# Patient Record
Sex: FEMALE | Race: WHITE | NOT HISPANIC OR LATINO | Employment: OTHER | URBAN - NONMETROPOLITAN AREA
[De-identification: names, ages, dates, MRNs, and addresses within clinical notes are randomized per-mention and may not be internally consistent; named-entity substitution may affect disease eponyms.]

---

## 2017-05-09 ENCOUNTER — HOSPITAL ENCOUNTER (OUTPATIENT)
Dept: GENERAL RADIOLOGY | Facility: HOSPITAL | Age: 67
Discharge: HOME | End: 2017-05-09
Attending: INTERNAL MEDICINE | Admitting: INTERNAL MEDICINE

## 2017-05-09 ENCOUNTER — TRANSCRIBE ORDERS (OUTPATIENT)
Dept: ADMINISTRATIVE | Facility: HOSPITAL | Age: 67
End: 2017-05-09

## 2017-05-09 DIAGNOSIS — M54.2 CERVICALGIA: Primary | ICD-10-CM

## 2017-05-09 DIAGNOSIS — M54.2 CERVICALGIA: ICD-10-CM

## 2017-05-09 PROCEDURE — 72052 X-RAY EXAM NECK SPINE 6/>VWS: CPT | Performed by: RADIOLOGY

## 2017-05-09 PROCEDURE — 72050 X-RAY EXAM NECK SPINE 4/5VWS: CPT

## 2017-06-14 ENCOUNTER — TRANSCRIBE ORDERS (OUTPATIENT)
Dept: ADMINISTRATIVE | Facility: HOSPITAL | Age: 67
End: 2017-06-14

## 2017-06-14 DIAGNOSIS — M81.0 OSTEOPOROSIS: Primary | ICD-10-CM

## 2017-06-30 ENCOUNTER — HOSPITAL ENCOUNTER (OUTPATIENT)
Dept: BONE DENSITY | Facility: HOSPITAL | Age: 67
Discharge: HOME | End: 2017-06-30
Attending: INTERNAL MEDICINE | Admitting: INTERNAL MEDICINE

## 2017-06-30 DIAGNOSIS — M81.0 OSTEOPOROSIS: ICD-10-CM

## 2017-06-30 PROCEDURE — 77080 DXA BONE DENSITY AXIAL: CPT

## 2017-06-30 PROCEDURE — 77080 DXA BONE DENSITY AXIAL: CPT | Performed by: RADIOLOGY

## 2017-12-08 ENCOUNTER — TRANSCRIBE ORDERS (OUTPATIENT)
Dept: ADMINISTRATIVE | Facility: HOSPITAL | Age: 67
End: 2017-12-08

## 2017-12-08 DIAGNOSIS — R68.84 JAW PAIN: Primary | ICD-10-CM

## 2017-12-13 ENCOUNTER — HOSPITAL ENCOUNTER (OUTPATIENT)
Dept: CARDIOLOGY | Facility: HOSPITAL | Age: 67
Discharge: HOME | End: 2017-12-13
Attending: INTERNAL MEDICINE

## 2017-12-13 DIAGNOSIS — R68.84 JAW PAIN: ICD-10-CM

## 2017-12-13 LAB
BH CV ECHO MEAS - % IVS THICK: 47.1 %
BH CV ECHO MEAS - % LVPW THICK: 121.1 %
BH CV ECHO MEAS - ACS: 1.6 CM
BH CV ECHO MEAS - AO MAX PG: 8.1 MMHG
BH CV ECHO MEAS - AO MEAN PG: 4.4 MMHG
BH CV ECHO MEAS - AO ROOT AREA (BSA CORRECTED): 1.6
BH CV ECHO MEAS - AO ROOT AREA: 6 CM^2
BH CV ECHO MEAS - AO ROOT DIAM: 2.8 CM
BH CV ECHO MEAS - AO V2 MAX: 142.4 CM/SEC
BH CV ECHO MEAS - AO V2 MEAN: 100.1 CM/SEC
BH CV ECHO MEAS - AO V2 VTI: 31 CM
BH CV ECHO MEAS - BSA(HAYCOCK): 1.8 M^2
BH CV ECHO MEAS - BSA: 1.7 M^2
BH CV ECHO MEAS - BZI_BMI: 30.2 KILOGRAMS/M^2
BH CV ECHO MEAS - BZI_METRIC_HEIGHT: 154.9 CM
BH CV ECHO MEAS - BZI_METRIC_WEIGHT: 72.6 KG
BH CV ECHO MEAS - CONTRAST EF 4CH: 63.4 ML/M^2
BH CV ECHO MEAS - EDV(CUBED): 85.4 ML
BH CV ECHO MEAS - EDV(MOD-SP4): 41 ML
BH CV ECHO MEAS - EDV(TEICH): 87.9 ML
BH CV ECHO MEAS - EF(CUBED): 76.4 %
BH CV ECHO MEAS - EF(TEICH): 68.7 %
BH CV ECHO MEAS - ESV(CUBED): 20.2 ML
BH CV ECHO MEAS - ESV(MOD-SP4): 15 ML
BH CV ECHO MEAS - ESV(TEICH): 27.5 ML
BH CV ECHO MEAS - FS: 38.2 %
BH CV ECHO MEAS - IVS/LVPW: 0.89
BH CV ECHO MEAS - IVSD: 0.61 CM
BH CV ECHO MEAS - IVSS: 0.9 CM
BH CV ECHO MEAS - LA DIMENSION: 3.3 CM
BH CV ECHO MEAS - LA/AO: 1.2
BH CV ECHO MEAS - LV DIASTOLIC VOL/BSA (35-75): 23.9 ML/M^2
BH CV ECHO MEAS - LV MASS(C)D: 83.1 GRAMS
BH CV ECHO MEAS - LV MASS(C)DI: 48.3 GRAMS/M^2
BH CV ECHO MEAS - LV MASS(C)S: 95.5 GRAMS
BH CV ECHO MEAS - LV MASS(C)SI: 55.6 GRAMS/M^2
BH CV ECHO MEAS - LV SYSTOLIC VOL/BSA (12-30): 8.7 ML/M^2
BH CV ECHO MEAS - LVIDD: 4.4 CM
BH CV ECHO MEAS - LVIDS: 2.7 CM
BH CV ECHO MEAS - LVLD AP4: 6.5 CM
BH CV ECHO MEAS - LVLS AP4: 5.7 CM
BH CV ECHO MEAS - LVOT AREA (M): 2.5 CM^2
BH CV ECHO MEAS - LVOT AREA: 2.5 CM^2
BH CV ECHO MEAS - LVOT DIAM: 1.8 CM
BH CV ECHO MEAS - LVPWD: 0.68 CM
BH CV ECHO MEAS - LVPWS: 1.5 CM
BH CV ECHO MEAS - MV A MAX VEL: 92.8 CM/SEC
BH CV ECHO MEAS - MV E MAX VEL: 65.2 CM/SEC
BH CV ECHO MEAS - MV E/A: 0.7
BH CV ECHO MEAS - PA ACC SLOPE: 1112 CM/SEC^2
BH CV ECHO MEAS - PA ACC TIME: 0.09 SEC
BH CV ECHO MEAS - PA PR(ACCEL): 37.8 MMHG
BH CV ECHO MEAS - RAP SYSTOLE: 10 MMHG
BH CV ECHO MEAS - RVDD: 2.2 CM
BH CV ECHO MEAS - RVSP: 36.7 MMHG
BH CV ECHO MEAS - SI(AO): 107.6 ML/M^2
BH CV ECHO MEAS - SI(CUBED): 38 ML/M^2
BH CV ECHO MEAS - SI(MOD-SP4): 15.1 ML/M^2
BH CV ECHO MEAS - SI(TEICH): 35.1 ML/M^2
BH CV ECHO MEAS - SV(AO): 184.8 ML
BH CV ECHO MEAS - SV(CUBED): 65.3 ML
BH CV ECHO MEAS - SV(MOD-SP4): 26 ML
BH CV ECHO MEAS - SV(TEICH): 60.3 ML
BH CV ECHO MEAS - TR MAX VEL: 258.2 CM/SEC
MAXIMAL PREDICTED HEART RATE: 152 BPM
STRESS TARGET HR: 129 BPM

## 2017-12-13 PROCEDURE — 93306 TTE W/DOPPLER COMPLETE: CPT

## 2018-02-28 ENCOUNTER — TRANSCRIBE ORDERS (OUTPATIENT)
Dept: ADMINISTRATIVE | Facility: HOSPITAL | Age: 68
End: 2018-02-28

## 2018-02-28 DIAGNOSIS — Z12.31 VISIT FOR SCREENING MAMMOGRAM: Primary | ICD-10-CM

## 2018-03-23 ENCOUNTER — APPOINTMENT (OUTPATIENT)
Dept: MAMMOGRAPHY | Facility: HOSPITAL | Age: 68
End: 2018-03-23
Attending: INTERNAL MEDICINE

## 2018-03-25 ENCOUNTER — APPOINTMENT (OUTPATIENT)
Dept: GENERAL RADIOLOGY | Facility: HOSPITAL | Age: 68
End: 2018-03-25

## 2018-03-25 ENCOUNTER — HOSPITAL ENCOUNTER (EMERGENCY)
Facility: HOSPITAL | Age: 68
Discharge: HOME | End: 2018-03-25
Attending: EMERGENCY MEDICINE | Admitting: EMERGENCY MEDICINE

## 2018-03-25 VITALS
DIASTOLIC BLOOD PRESSURE: 72 MMHG | SYSTOLIC BLOOD PRESSURE: 126 MMHG | RESPIRATION RATE: 16 BRPM | WEIGHT: 161 LBS | OXYGEN SATURATION: 95 % | BODY MASS INDEX: 30.4 KG/M2 | HEIGHT: 61 IN | TEMPERATURE: 97.9 F | HEART RATE: 64 BPM

## 2018-03-25 DIAGNOSIS — J40 BRONCHITIS: Primary | ICD-10-CM

## 2018-03-25 LAB
ALBUMIN SERPL-MCNC: 4.2 G/DL (ref 3.4–4.8)
ALBUMIN/GLOB SERPL: 1.5 G/DL (ref 1.5–2.5)
ALP SERPL-CCNC: 73 U/L (ref 35–104)
ALT SERPL W P-5'-P-CCNC: 25 U/L (ref 10–36)
ANION GAP SERPL CALCULATED.3IONS-SCNC: 8.1 MMOL/L (ref 3.6–11.2)
AST SERPL-CCNC: 24 U/L (ref 10–30)
BASOPHILS # BLD AUTO: 0.01 10*3/MM3 (ref 0–0.3)
BASOPHILS NFR BLD AUTO: 0.1 % (ref 0–2)
BILIRUB SERPL-MCNC: 0.5 MG/DL (ref 0.2–1.8)
BILIRUB UR QL STRIP: NEGATIVE
BUN BLD-MCNC: 20 MG/DL (ref 7–21)
BUN/CREAT SERPL: 25 (ref 7–25)
CALCIUM SPEC-SCNC: 9.3 MG/DL (ref 7.7–10)
CHLORIDE SERPL-SCNC: 104 MMOL/L (ref 99–112)
CLARITY UR: CLEAR
CO2 SERPL-SCNC: 26.9 MMOL/L (ref 24.3–31.9)
COLOR UR: YELLOW
CREAT BLD-MCNC: 0.8 MG/DL (ref 0.43–1.29)
DEPRECATED RDW RBC AUTO: 43.2 FL (ref 37–54)
EOSINOPHIL # BLD AUTO: 0.09 10*3/MM3 (ref 0–0.7)
EOSINOPHIL NFR BLD AUTO: 1 % (ref 0–7)
ERYTHROCYTE [DISTWIDTH] IN BLOOD BY AUTOMATED COUNT: 13.1 % (ref 11.5–14.5)
FLUAV AG NPH QL: NEGATIVE
FLUBV AG NPH QL IA: NEGATIVE
GFR SERPL CREATININE-BSD FRML MDRD: 71 ML/MIN/1.73
GLOBULIN UR ELPH-MCNC: 2.8 GM/DL
GLUCOSE BLD-MCNC: 116 MG/DL (ref 70–110)
GLUCOSE UR STRIP-MCNC: NEGATIVE MG/DL
HCT VFR BLD AUTO: 39 % (ref 37–47)
HGB BLD-MCNC: 12.9 G/DL (ref 12–16)
HGB UR QL STRIP.AUTO: NEGATIVE
IMM GRANULOCYTES # BLD: 0.02 10*3/MM3 (ref 0–0.03)
IMM GRANULOCYTES NFR BLD: 0.2 % (ref 0–0.5)
KETONES UR QL STRIP: NEGATIVE
LEUKOCYTE ESTERASE UR QL STRIP.AUTO: NEGATIVE
LYMPHOCYTES # BLD AUTO: 1.61 10*3/MM3 (ref 1–3)
LYMPHOCYTES NFR BLD AUTO: 17.9 % (ref 16–46)
MCH RBC QN AUTO: 29.9 PG (ref 27–33)
MCHC RBC AUTO-ENTMCNC: 33.1 G/DL (ref 33–37)
MCV RBC AUTO: 90.3 FL (ref 80–94)
MONOCYTES # BLD AUTO: 0.63 10*3/MM3 (ref 0.1–0.9)
MONOCYTES NFR BLD AUTO: 7 % (ref 0–12)
NEUTROPHILS # BLD AUTO: 6.61 10*3/MM3 (ref 1.4–6.5)
NEUTROPHILS NFR BLD AUTO: 73.8 % (ref 40–75)
NITRITE UR QL STRIP: NEGATIVE
OSMOLALITY SERPL CALC.SUM OF ELEC: 281.1 MOSM/KG (ref 273–305)
PH UR STRIP.AUTO: 8 [PH] (ref 5–8)
PLATELET # BLD AUTO: 241 10*3/MM3 (ref 130–400)
PMV BLD AUTO: 9.8 FL (ref 6–10)
POTASSIUM BLD-SCNC: 3.5 MMOL/L (ref 3.5–5.3)
PROT SERPL-MCNC: 7 G/DL (ref 6–8)
PROT UR QL STRIP: NEGATIVE
RBC # BLD AUTO: 4.32 10*6/MM3 (ref 4.2–5.4)
SODIUM BLD-SCNC: 139 MMOL/L (ref 135–153)
SP GR UR STRIP: 1.01 (ref 1–1.03)
TROPONIN I SERPL-MCNC: <0.006 NG/ML
UROBILINOGEN UR QL STRIP: NORMAL
WBC NRBC COR # BLD: 8.97 10*3/MM3 (ref 4.5–12.5)

## 2018-03-25 PROCEDURE — 93005 ELECTROCARDIOGRAM TRACING: CPT | Performed by: PHYSICIAN ASSISTANT

## 2018-03-25 PROCEDURE — 80053 COMPREHEN METABOLIC PANEL: CPT | Performed by: PHYSICIAN ASSISTANT

## 2018-03-25 PROCEDURE — 84484 ASSAY OF TROPONIN QUANT: CPT | Performed by: PHYSICIAN ASSISTANT

## 2018-03-25 PROCEDURE — 25010000002 METHYLPREDNISOLONE PER 125 MG: Performed by: PHYSICIAN ASSISTANT

## 2018-03-25 PROCEDURE — 96372 THER/PROPH/DIAG INJ SC/IM: CPT

## 2018-03-25 PROCEDURE — 71046 X-RAY EXAM CHEST 2 VIEWS: CPT

## 2018-03-25 PROCEDURE — 81003 URINALYSIS AUTO W/O SCOPE: CPT | Performed by: PHYSICIAN ASSISTANT

## 2018-03-25 PROCEDURE — 71046 X-RAY EXAM CHEST 2 VIEWS: CPT | Performed by: RADIOLOGY

## 2018-03-25 PROCEDURE — 87804 INFLUENZA ASSAY W/OPTIC: CPT | Performed by: PHYSICIAN ASSISTANT

## 2018-03-25 PROCEDURE — 85025 COMPLETE CBC W/AUTO DIFF WBC: CPT | Performed by: PHYSICIAN ASSISTANT

## 2018-03-25 PROCEDURE — 99283 EMERGENCY DEPT VISIT LOW MDM: CPT

## 2018-03-25 RX ORDER — OMEPRAZOLE 20 MG/1
40 CAPSULE, DELAYED RELEASE ORAL DAILY
COMMUNITY
End: 2020-01-13 | Stop reason: HOSPADM

## 2018-03-25 RX ORDER — MV-MIN/FOLIC/VIT K/LYCOP/COQ10 200-100MCG
CAPSULE ORAL
Status: ON HOLD | COMMUNITY
End: 2020-01-13

## 2018-03-25 RX ORDER — METOPROLOL SUCCINATE 25 MG/1
25 TABLET, EXTENDED RELEASE ORAL NIGHTLY
COMMUNITY

## 2018-03-25 RX ORDER — LISINOPRIL AND HYDROCHLOROTHIAZIDE 20; 12.5 MG/1; MG/1
1 TABLET ORAL DAILY
COMMUNITY
End: 2022-10-18 | Stop reason: ALTCHOICE

## 2018-03-25 RX ORDER — ROSUVASTATIN CALCIUM 20 MG/1
20 TABLET, COATED ORAL NIGHTLY
COMMUNITY

## 2018-03-25 RX ORDER — FUROSEMIDE 20 MG/1
20 TABLET ORAL AS NEEDED
Status: ON HOLD | COMMUNITY
End: 2020-01-13

## 2018-03-25 RX ORDER — CELECOXIB 200 MG/1
200 CAPSULE ORAL DAILY
Status: ON HOLD | COMMUNITY
End: 2020-01-13

## 2018-03-25 RX ORDER — METHYLPREDNISOLONE SODIUM SUCCINATE 125 MG/2ML
125 INJECTION, POWDER, LYOPHILIZED, FOR SOLUTION INTRAMUSCULAR; INTRAVENOUS ONCE
Status: COMPLETED | OUTPATIENT
Start: 2018-03-25 | End: 2018-03-25

## 2018-03-25 RX ORDER — MULTIPLE VITAMINS W/ MINERALS TAB 9MG-400MCG
1 TAB ORAL DAILY
COMMUNITY
End: 2021-04-22

## 2018-03-25 RX ORDER — AMOXICILLIN AND CLAVULANATE POTASSIUM 875; 125 MG/1; MG/1
1 TABLET, FILM COATED ORAL 2 TIMES DAILY
Qty: 14 TABLET | Refills: 0 | Status: SHIPPED | OUTPATIENT
Start: 2018-03-25 | End: 2018-04-01

## 2018-03-25 RX ADMIN — METHYLPREDNISOLONE SODIUM SUCCINATE 125 MG: 125 INJECTION, POWDER, FOR SOLUTION INTRAMUSCULAR; INTRAVENOUS at 13:07

## 2018-03-25 NOTE — ED PROVIDER NOTES
Subjective   Pt was seen on Wednesday for flu like symptoms at an urgent care. Tested negative for flu and strep, but was treated for a presumed flu with tamiflu. Pt states she has been taking that for 4 days and has still had no improvement. States she has a productive cough, and is worried she could have a pneumonia. She states she has had some chest tightness that is caused by her coughing, denies any pressure/tightness/squeezing. She states she had a fever earlier in the week as high as 101.5.        History provided by:  Patient   used: No    Cough   Cough characteristics:  Productive  Sputum characteristics:  Clear and yellow  Severity:  Moderate  Onset quality:  Sudden  Duration:  1 week  Timing:  Constant  Progression:  Unchanged  Chronicity:  New  Smoker: no    Context: upper respiratory infection    Relieved by:  Nothing  Ineffective treatments: tamiflu for suspected influenza.  Associated symptoms: myalgias    Associated symptoms: no chest pain, no fever, no headaches, no rash, no rhinorrhea, no shortness of breath, no sore throat and no wheezing    Risk factors: no recent infection        Review of Systems   Constitutional: Negative for activity change and fever.   HENT: Negative for congestion, rhinorrhea and sore throat.    Eyes: Negative for pain.   Respiratory: Positive for cough. Negative for shortness of breath and wheezing.    Cardiovascular: Negative for chest pain.   Gastrointestinal: Negative for abdominal distention, diarrhea, nausea and vomiting.   Genitourinary: Negative for difficulty urinating and dysuria.   Musculoskeletal: Positive for myalgias. Negative for arthralgias.   Skin: Negative for rash and wound.   Neurological: Negative for dizziness and headaches.   Psychiatric/Behavioral: Negative for agitation.   All other systems reviewed and are negative.      Past Medical History:   Diagnosis Date   • Arthritis    • Hyperlipidemia    • Hypertension        No Known  Allergies    Past Surgical History:   Procedure Laterality Date   • HYSTERECTOMY         History reviewed. No pertinent family history.    Social History     Social History   • Marital status:      Social History Main Topics   • Smoking status: Never Smoker   • Alcohol use No   • Drug use: No     Other Topics Concern   • Not on file           Objective   Physical Exam   Constitutional: She is oriented to person, place, and time. She appears well-developed and well-nourished.   HENT:   Head: Normocephalic and atraumatic.   Nose: Rhinorrhea present.   Mouth/Throat: No posterior oropharyngeal erythema.   Eyes: EOM are normal. Pupils are equal, round, and reactive to light.   Neck: Normal range of motion. Neck supple.   Cardiovascular: Normal rate, regular rhythm and normal heart sounds.    Pulmonary/Chest: Effort normal and breath sounds normal. She has no decreased breath sounds. She has no wheezes.   Abdominal: Soft. Bowel sounds are normal.   Musculoskeletal: Normal range of motion.   Neurological: She is alert and oriented to person, place, and time.   Skin: Skin is warm and dry.   Psychiatric: She has a normal mood and affect. Her behavior is normal. Judgment and thought content normal.   Nursing note and vitals reviewed.      Procedures         ED Course  ED Course                  MDM  Number of Diagnoses or Management Options     Amount and/or Complexity of Data Reviewed  Clinical lab tests: ordered and reviewed  Tests in the radiology section of CPT®: ordered and reviewed  Tests in the medicine section of CPT®: reviewed and ordered  Independent visualization of images, tracings, or specimens: yes    Patient Progress  Patient progress: stable      Final diagnoses:   Bronchitis            SARI Wild  03/25/18 2040

## 2018-10-04 ENCOUNTER — HOSPITAL ENCOUNTER (EMERGENCY)
Facility: HOSPITAL | Age: 68
Discharge: HOME | End: 2018-10-04
Attending: EMERGENCY MEDICINE | Admitting: EMERGENCY MEDICINE

## 2018-10-04 VITALS
HEART RATE: 65 BPM | RESPIRATION RATE: 18 BRPM | DIASTOLIC BLOOD PRESSURE: 72 MMHG | HEIGHT: 61 IN | TEMPERATURE: 98.2 F | BODY MASS INDEX: 32.47 KG/M2 | SYSTOLIC BLOOD PRESSURE: 165 MMHG | OXYGEN SATURATION: 98 % | WEIGHT: 172 LBS

## 2018-10-04 DIAGNOSIS — N30.01 ACUTE CYSTITIS WITH HEMATURIA: Primary | ICD-10-CM

## 2018-10-04 LAB
BACTERIA UR QL AUTO: ABNORMAL /HPF
BILIRUB UR QL STRIP: NEGATIVE
CLARITY UR: ABNORMAL
COLOR UR: YELLOW
GLUCOSE UR STRIP-MCNC: NEGATIVE MG/DL
HGB UR QL STRIP.AUTO: ABNORMAL
HYALINE CASTS UR QL AUTO: ABNORMAL /LPF
KETONES UR QL STRIP: NEGATIVE
LEUKOCYTE ESTERASE UR QL STRIP.AUTO: ABNORMAL
NITRITE UR QL STRIP: NEGATIVE
PH UR STRIP.AUTO: 7 [PH] (ref 5–8)
PROT UR QL STRIP: ABNORMAL
RBC # UR: ABNORMAL /HPF
REF LAB TEST METHOD: ABNORMAL
SP GR UR STRIP: 1.01 (ref 1–1.03)
SQUAMOUS #/AREA URNS HPF: ABNORMAL /HPF
UROBILINOGEN UR QL STRIP: ABNORMAL
WBC UR QL AUTO: ABNORMAL /HPF

## 2018-10-04 PROCEDURE — 81001 URINALYSIS AUTO W/SCOPE: CPT | Performed by: EMERGENCY MEDICINE

## 2018-10-04 PROCEDURE — 99284 EMERGENCY DEPT VISIT MOD MDM: CPT

## 2018-10-04 RX ORDER — NITROFURANTOIN 25; 75 MG/1; MG/1
100 CAPSULE ORAL ONCE
Status: COMPLETED | OUTPATIENT
Start: 2018-10-04 | End: 2018-10-04

## 2018-10-04 RX ORDER — PHENAZOPYRIDINE HYDROCHLORIDE 200 MG/1
200 TABLET, FILM COATED ORAL ONCE
Status: COMPLETED | OUTPATIENT
Start: 2018-10-04 | End: 2018-10-04

## 2018-10-04 RX ORDER — NITROFURANTOIN 25; 75 MG/1; MG/1
100 CAPSULE ORAL 2 TIMES DAILY
Qty: 14 CAPSULE | Refills: 0 | Status: SHIPPED | OUTPATIENT
Start: 2018-10-04 | End: 2018-10-11

## 2018-10-04 RX ORDER — PHENAZOPYRIDINE HYDROCHLORIDE 200 MG/1
200 TABLET, FILM COATED ORAL 3 TIMES DAILY PRN
Qty: 6 TABLET | Refills: 0 | Status: ON HOLD | OUTPATIENT
Start: 2018-10-04 | End: 2020-01-13

## 2018-10-04 RX ADMIN — PHENAZOPYRIDINE 200 MG: 200 TABLET ORAL at 21:38

## 2018-10-04 RX ADMIN — NITROFURANTOIN MONOHYDRATE/MACROCRYSTALLINE 100 MG: 25; 75 CAPSULE ORAL at 21:39

## 2018-10-05 NOTE — ED NOTES
Been off marcobid x5 days but continues to hurt. States not emptying     Sharita Storey RN  10/04/18 3323

## 2018-10-05 NOTE — ED PROVIDER NOTES
Subjective   This is a 68-year-old female comes in with chief complaint dysuria for the past 2 days.  Patient states she's had increased urinary frequency, burning with urination and low back pain.  Patient denies any recent nausea, vomiting,, fever, chills.        History provided by:  Patient   used: No    Female  Problem   Primary symptoms include discharge, dysuria.  Primary symptoms include no pelvic pain. There has been no fever. This is a new problem. The problem has not changed since onset.The symptoms occur during urination. She is not pregnant. The discharge was normal. Associated symptoms include abdominal pain and frequency. Pertinent negatives include no anorexia, no diaphoresis and no nausea. She has tried nothing for the symptoms. Associated medical issues do not include STD, PID, perineal abscess, vaginosis, hypermenorrhea, ovarian cysts, endometriosis or gynecological surgery.       Review of Systems   Constitutional: Negative for diaphoresis.   Gastrointestinal: Positive for abdominal distention and abdominal pain. Negative for anorexia and nausea.   Genitourinary: Positive for dysuria, frequency and urgency. Negative for enuresis, flank pain and pelvic pain.   All other systems reviewed and are negative.      Past Medical History:   Diagnosis Date   • Arthritis    • Hyperlipidemia    • Hypertension        No Known Allergies    Past Surgical History:   Procedure Laterality Date   • HYSTERECTOMY         History reviewed. No pertinent family history.    Social History     Social History   • Marital status:      Social History Main Topics   • Smoking status: Never Smoker   • Alcohol use No   • Drug use: No     Other Topics Concern   • Not on file           Objective   Physical Exam   Constitutional: She is oriented to person, place, and time. She appears well-developed and well-nourished. No distress.   HENT:   Head: Normocephalic.   Right Ear: External ear normal.   Left  Ear: External ear normal.   Nose: Nose normal.   Mouth/Throat: Oropharynx is clear and moist. No oropharyngeal exudate.   Eyes: Pupils are equal, round, and reactive to light. Conjunctivae and EOM are normal. Right eye exhibits no discharge. Left eye exhibits no discharge. No scleral icterus.   Neck: Normal range of motion. Neck supple. No JVD present. No tracheal deviation present. No thyromegaly present.   Cardiovascular: Normal rate, regular rhythm, normal heart sounds and intact distal pulses.  Exam reveals no friction rub.    No murmur heard.  Pulmonary/Chest: Effort normal and breath sounds normal. No stridor. No respiratory distress. She has no wheezes. She has no rales. She exhibits no tenderness.   Abdominal: Soft. Bowel sounds are normal. She exhibits no distension and no mass. There is no tenderness. There is no rebound and no guarding. No hernia.   Musculoskeletal: Normal range of motion. She exhibits no edema, tenderness or deformity.   Lymphadenopathy:     She has no cervical adenopathy.   Neurological: She is alert and oriented to person, place, and time. She displays normal reflexes. No cranial nerve deficit or sensory deficit. She exhibits normal muscle tone. Coordination normal.   Skin: Skin is warm. Capillary refill takes less than 2 seconds. No rash noted. No erythema. No pallor.   Psychiatric: She has a normal mood and affect. Her behavior is normal. Judgment and thought content normal.   Nursing note and vitals reviewed.      Procedures           ED Course  ED Course as of Oct 04 2139   Thu Oct 04, 2018   2136 Discussed care with patient. Advised to follow-up with pcp.   [BH]      ED Course User Index  [] Tk Cedeno PA-C                  [facility]      Final diagnoses:   Acute cystitis with hematuria            Tk Cedeno PA-C  10/04/18 2139

## 2019-05-13 ENCOUNTER — TRANSCRIBE ORDERS (OUTPATIENT)
Dept: ADMINISTRATIVE | Facility: HOSPITAL | Age: 69
End: 2019-05-13

## 2019-05-13 ENCOUNTER — HOSPITAL ENCOUNTER (OUTPATIENT)
Dept: GENERAL RADIOLOGY | Facility: HOSPITAL | Age: 69
Discharge: HOME | End: 2019-05-13
Admitting: INTERNAL MEDICINE

## 2019-05-13 DIAGNOSIS — M54.50 LUMBAR PAIN: Primary | ICD-10-CM

## 2019-05-13 DIAGNOSIS — M54.50 LUMBAR PAIN: ICD-10-CM

## 2019-05-13 PROCEDURE — 72110 X-RAY EXAM L-2 SPINE 4/>VWS: CPT | Performed by: RADIOLOGY

## 2019-05-13 PROCEDURE — 72110 X-RAY EXAM L-2 SPINE 4/>VWS: CPT

## 2019-07-29 ENCOUNTER — HOSPITAL ENCOUNTER (OUTPATIENT)
Dept: GENERAL RADIOLOGY | Facility: HOSPITAL | Age: 69
Discharge: HOME | End: 2019-07-29
Admitting: INTERNAL MEDICINE

## 2019-07-29 ENCOUNTER — TRANSCRIBE ORDERS (OUTPATIENT)
Dept: ADMINISTRATIVE | Facility: HOSPITAL | Age: 69
End: 2019-07-29

## 2019-07-29 DIAGNOSIS — R79.81 ABNORMAL ARTERIAL BLOOD GASES: ICD-10-CM

## 2019-07-29 DIAGNOSIS — R79.81 ABNORMAL ARTERIAL BLOOD GASES: Primary | ICD-10-CM

## 2019-07-29 PROCEDURE — 71046 X-RAY EXAM CHEST 2 VIEWS: CPT

## 2019-07-29 PROCEDURE — 71046 X-RAY EXAM CHEST 2 VIEWS: CPT | Performed by: RADIOLOGY

## 2020-01-02 ENCOUNTER — TRANSCRIBE ORDERS (OUTPATIENT)
Dept: OTHER | Facility: OTHER | Age: 70
End: 2020-01-02

## 2020-01-02 ENCOUNTER — HOSPITAL ENCOUNTER (OUTPATIENT)
Dept: GENERAL RADIOLOGY | Facility: HOSPITAL | Age: 70
Discharge: HOME | End: 2020-01-02
Admitting: INTERNAL MEDICINE

## 2020-01-02 DIAGNOSIS — M16.10 DEGENERATIVE JOINT DISEASE OF PELVIC REGION: ICD-10-CM

## 2020-01-02 DIAGNOSIS — M16.10 DEGENERATIVE JOINT DISEASE OF PELVIC REGION: Primary | ICD-10-CM

## 2020-01-02 PROCEDURE — 73523 X-RAY EXAM HIPS BI 5/> VIEWS: CPT | Performed by: RADIOLOGY

## 2020-01-02 PROCEDURE — 73523 X-RAY EXAM HIPS BI 5/> VIEWS: CPT

## 2020-01-13 ENCOUNTER — APPOINTMENT (OUTPATIENT)
Dept: CARDIOLOGY | Facility: HOSPITAL | Age: 70
End: 2020-01-13

## 2020-01-13 ENCOUNTER — APPOINTMENT (OUTPATIENT)
Dept: NUCLEAR MEDICINE | Facility: HOSPITAL | Age: 70
End: 2020-01-13

## 2020-01-13 ENCOUNTER — HOSPITAL ENCOUNTER (OUTPATIENT)
Facility: HOSPITAL | Age: 70
Setting detail: OBSERVATION
Discharge: HOME | End: 2020-01-13
Attending: FAMILY MEDICINE | Admitting: PHYSICIAN ASSISTANT

## 2020-01-13 ENCOUNTER — APPOINTMENT (OUTPATIENT)
Dept: GENERAL RADIOLOGY | Facility: HOSPITAL | Age: 70
End: 2020-01-13

## 2020-01-13 VITALS
RESPIRATION RATE: 20 BRPM | HEART RATE: 75 BPM | WEIGHT: 166.6 LBS | TEMPERATURE: 97.7 F | HEIGHT: 61 IN | BODY MASS INDEX: 31.45 KG/M2 | DIASTOLIC BLOOD PRESSURE: 82 MMHG | SYSTOLIC BLOOD PRESSURE: 157 MMHG | OXYGEN SATURATION: 96 %

## 2020-01-13 DIAGNOSIS — R07.9 CHEST PAIN WITH LOW RISK FOR CARDIAC ETIOLOGY: Primary | ICD-10-CM

## 2020-01-13 LAB
A-A DO2: 28.6 MMHG (ref 0–300)
ALBUMIN SERPL-MCNC: 4.29 G/DL (ref 3.5–5.2)
ALBUMIN/GLOB SERPL: 1.6 G/DL
ALP SERPL-CCNC: 68 U/L (ref 39–117)
ALT SERPL W P-5'-P-CCNC: 20 U/L (ref 1–33)
ANION GAP SERPL CALCULATED.3IONS-SCNC: 15.2 MMOL/L (ref 5–15)
APTT PPP: 26.7 SECONDS (ref 23.8–36.1)
ARTERIAL PATENCY WRIST A: POSITIVE
AST SERPL-CCNC: 23 U/L (ref 1–32)
ATMOSPHERIC PRESS: 733 MMHG
BASE EXCESS BLDA CALC-SCNC: 1.2 MMOL/L (ref 0–2)
BASOPHILS # BLD AUTO: 0.04 10*3/MM3 (ref 0–0.2)
BASOPHILS NFR BLD AUTO: 0.5 % (ref 0–1.5)
BDY SITE: ABNORMAL
BH CV ECHO MEAS - % IVS THICK: 17 %
BH CV ECHO MEAS - % LVPW THICK: 61.9 %
BH CV ECHO MEAS - ACS: 1.6 CM
BH CV ECHO MEAS - AO MAX PG: 8.4 MMHG
BH CV ECHO MEAS - AO MEAN PG: 3.6 MMHG
BH CV ECHO MEAS - AO ROOT AREA (BSA CORRECTED): 1.5
BH CV ECHO MEAS - AO ROOT AREA: 5.3 CM^2
BH CV ECHO MEAS - AO ROOT DIAM: 2.6 CM
BH CV ECHO MEAS - AO V2 MAX: 145.2 CM/SEC
BH CV ECHO MEAS - AO V2 MEAN: 85 CM/SEC
BH CV ECHO MEAS - AO V2 VTI: 31.4 CM
BH CV ECHO MEAS - BSA(HAYCOCK): 1.8 M^2
BH CV ECHO MEAS - BSA: 1.7 M^2
BH CV ECHO MEAS - BZI_BMI: 31 KILOGRAMS/M^2
BH CV ECHO MEAS - BZI_METRIC_HEIGHT: 154.9 CM
BH CV ECHO MEAS - BZI_METRIC_WEIGHT: 74.4 KG
BH CV ECHO MEAS - EDV(CUBED): 66.1 ML
BH CV ECHO MEAS - EDV(MOD-SP4): 36 ML
BH CV ECHO MEAS - EDV(TEICH): 71.8 ML
BH CV ECHO MEAS - EF(CUBED): 77 %
BH CV ECHO MEAS - EF(MOD-SP4): 63.9 %
BH CV ECHO MEAS - EF(TEICH): 69.6 %
BH CV ECHO MEAS - ESV(CUBED): 15.2 ML
BH CV ECHO MEAS - ESV(MOD-SP4): 13 ML
BH CV ECHO MEAS - ESV(TEICH): 21.8 ML
BH CV ECHO MEAS - FS: 38.7 %
BH CV ECHO MEAS - IVS/LVPW: 1.1
BH CV ECHO MEAS - IVSD: 1 CM
BH CV ECHO MEAS - IVSS: 1.2 CM
BH CV ECHO MEAS - LA DIMENSION: 2.6 CM
BH CV ECHO MEAS - LA/AO: 1
BH CV ECHO MEAS - LV DIASTOLIC VOL/BSA (35-75): 20.7 ML/M^2
BH CV ECHO MEAS - LV MASS(C)D: 123.8 GRAMS
BH CV ECHO MEAS - LV MASS(C)DI: 71.3 GRAMS/M^2
BH CV ECHO MEAS - LV MASS(C)S: 101.9 GRAMS
BH CV ECHO MEAS - LV MASS(C)SI: 58.7 GRAMS/M^2
BH CV ECHO MEAS - LV SYSTOLIC VOL/BSA (12-30): 7.5 ML/M^2
BH CV ECHO MEAS - LVIDD: 4 CM
BH CV ECHO MEAS - LVIDS: 2.5 CM
BH CV ECHO MEAS - LVLD AP4: 6.3 CM
BH CV ECHO MEAS - LVLS AP4: 5.6 CM
BH CV ECHO MEAS - LVOT AREA (M): 2.3 CM^2
BH CV ECHO MEAS - LVOT AREA: 2.1 CM^2
BH CV ECHO MEAS - LVOT DIAM: 1.7 CM
BH CV ECHO MEAS - LVPWD: 0.91 CM
BH CV ECHO MEAS - LVPWS: 1.5 CM
BH CV ECHO MEAS - MV A MAX VEL: 98.2 CM/SEC
BH CV ECHO MEAS - MV E MAX VEL: 61.2 CM/SEC
BH CV ECHO MEAS - MV E/A: 0.62
BH CV ECHO MEAS - PA ACC SLOPE: 938.1 CM/SEC^2
BH CV ECHO MEAS - PA ACC TIME: 0.11 SEC
BH CV ECHO MEAS - PA PR(ACCEL): 31.5 MMHG
BH CV ECHO MEAS - RAP SYSTOLE: 10 MMHG
BH CV ECHO MEAS - RVSP: 42.8 MMHG
BH CV ECHO MEAS - SI(AO): 95 ML/M^2
BH CV ECHO MEAS - SI(CUBED): 29.3 ML/M^2
BH CV ECHO MEAS - SI(MOD-SP4): 13.2 ML/M^2
BH CV ECHO MEAS - SI(TEICH): 28.8 ML/M^2
BH CV ECHO MEAS - SV(AO): 165 ML
BH CV ECHO MEAS - SV(CUBED): 50.9 ML
BH CV ECHO MEAS - SV(MOD-SP4): 23 ML
BH CV ECHO MEAS - SV(TEICH): 50 ML
BH CV ECHO MEAS - TR MAX VEL: 286.4 CM/SEC
BH CV NUCLEAR PRIOR STUDY: 3
BH CV STRESS BP STAGE 1: NORMAL
BH CV STRESS BP STAGE 2: NORMAL
BH CV STRESS COMMENTS STAGE 1: NORMAL
BH CV STRESS COMMENTS STAGE 2: NORMAL
BH CV STRESS DOSE REGADENOSON STAGE 1: 0.4
BH CV STRESS DURATION MIN STAGE 1: 0
BH CV STRESS DURATION MIN STAGE 2: 4
BH CV STRESS DURATION SEC STAGE 1: 10
BH CV STRESS DURATION SEC STAGE 2: 0
BH CV STRESS HR STAGE 1: 105
BH CV STRESS HR STAGE 2: 84
BH CV STRESS PROTOCOL 1: NORMAL
BH CV STRESS RECOVERY BP: NORMAL MMHG
BH CV STRESS RECOVERY HR: 84 BPM
BH CV STRESS STAGE 1: 1
BH CV STRESS STAGE 2: 2
BILIRUB SERPL-MCNC: 0.2 MG/DL (ref 0.2–1.2)
BILIRUB UR QL STRIP: NEGATIVE
BODY TEMPERATURE: 0 C
BUN BLD-MCNC: 15 MG/DL (ref 8–23)
BUN/CREAT SERPL: 16.7 (ref 7–25)
CALCIUM SPEC-SCNC: 10 MG/DL (ref 8.6–10.5)
CHLORIDE SERPL-SCNC: 103 MMOL/L (ref 98–107)
CLARITY UR: CLEAR
CO2 BLDA-SCNC: 26.7 MMOL/L (ref 22–33)
CO2 SERPL-SCNC: 24.8 MMOL/L (ref 22–29)
COHGB MFR BLD: 0.5 % (ref 0–5)
COLOR UR: YELLOW
CREAT BLD-MCNC: 0.9 MG/DL (ref 0.57–1)
CRP SERPL-MCNC: <0.03 MG/DL (ref 0–0.5)
DEPRECATED RDW RBC AUTO: 43.7 FL (ref 37–54)
EOSINOPHIL # BLD AUTO: 0.25 10*3/MM3 (ref 0–0.4)
EOSINOPHIL NFR BLD AUTO: 3.1 % (ref 0.3–6.2)
ERYTHROCYTE [DISTWIDTH] IN BLOOD BY AUTOMATED COUNT: 13.2 % (ref 12.3–15.4)
GFR SERPL CREATININE-BSD FRML MDRD: 62 ML/MIN/1.73
GLOBULIN UR ELPH-MCNC: 2.6 GM/DL
GLUCOSE BLD-MCNC: 105 MG/DL (ref 65–99)
GLUCOSE BLDC GLUCOMTR-MCNC: 114 MG/DL (ref 70–130)
GLUCOSE UR STRIP-MCNC: NEGATIVE MG/DL
HBA1C MFR BLD: 5.6 % (ref 4.8–5.6)
HCO3 BLDA-SCNC: 25.5 MMOL/L (ref 20–26)
HCT VFR BLD AUTO: 40.5 % (ref 34–46.6)
HCT VFR BLD CALC: 41.3 % (ref 38–51)
HGB BLD-MCNC: 13.5 G/DL (ref 12–15.9)
HGB BLDA-MCNC: 13.5 G/DL (ref 13.5–17.5)
HGB UR QL STRIP.AUTO: NEGATIVE
HOROWITZ INDEX BLD+IHG-RTO: 21 %
IMM GRANULOCYTES # BLD AUTO: 0.02 10*3/MM3 (ref 0–0.05)
IMM GRANULOCYTES NFR BLD AUTO: 0.2 % (ref 0–0.5)
INR PPP: 0.92 (ref 0.9–1.1)
KETONES UR QL STRIP: NEGATIVE
LEUKOCYTE ESTERASE UR QL STRIP.AUTO: NEGATIVE
LIPASE SERPL-CCNC: 66 U/L (ref 13–60)
LYMPHOCYTES # BLD AUTO: 2.11 10*3/MM3 (ref 0.7–3.1)
LYMPHOCYTES NFR BLD AUTO: 26.1 % (ref 19.6–45.3)
Lab: ABNORMAL
MAXIMAL PREDICTED HEART RATE: 150 BPM
MAXIMAL PREDICTED HEART RATE: 150 BPM
MCH RBC QN AUTO: 30.1 PG (ref 26.6–33)
MCHC RBC AUTO-ENTMCNC: 33.3 G/DL (ref 31.5–35.7)
MCV RBC AUTO: 90.2 FL (ref 79–97)
METHGB BLD QL: 0.3 % (ref 0–3)
MODALITY: ABNORMAL
MONOCYTES # BLD AUTO: 0.49 10*3/MM3 (ref 0.1–0.9)
MONOCYTES NFR BLD AUTO: 6.1 % (ref 5–12)
NEUTROPHILS # BLD AUTO: 5.18 10*3/MM3 (ref 1.7–7)
NEUTROPHILS NFR BLD AUTO: 64 % (ref 42.7–76)
NITRITE UR QL STRIP: NEGATIVE
NOTE: ABNORMAL
NRBC BLD AUTO-RTO: 0 /100 WBC (ref 0–0.2)
NT-PROBNP SERPL-MCNC: 57.8 PG/ML (ref 5–900)
OXYHGB MFR BLDV: 94.4 % (ref 94–99)
PCO2 BLDA: 38.7 MM HG (ref 35–45)
PCO2 TEMP ADJ BLD: ABNORMAL MM[HG]
PERCENT MAX PREDICTED HR: 70 %
PH BLDA: 7.43 PH UNITS (ref 7.35–7.45)
PH UR STRIP.AUTO: 6.5 [PH] (ref 5–8)
PH, TEMP CORRECTED: ABNORMAL
PLATELET # BLD AUTO: 253 10*3/MM3 (ref 140–450)
PMV BLD AUTO: 9.1 FL (ref 6–12)
PO2 BLDA: 71.7 MM HG (ref 83–108)
PO2 TEMP ADJ BLD: ABNORMAL MM[HG]
POTASSIUM BLD-SCNC: 3.7 MMOL/L (ref 3.5–5.2)
PROT SERPL-MCNC: 6.9 G/DL (ref 6–8.5)
PROT UR QL STRIP: NEGATIVE
PROTHROMBIN TIME: 12.9 SECONDS (ref 11–15.4)
RBC # BLD AUTO: 4.49 10*6/MM3 (ref 3.77–5.28)
SAO2 % BLDCOA: 95.2 % (ref 94–99)
SODIUM BLD-SCNC: 143 MMOL/L (ref 136–145)
SP GR UR STRIP: 1.01 (ref 1–1.03)
STRESS BASELINE BP: NORMAL MMHG
STRESS BASELINE HR: 80 BPM
STRESS PERCENT HR: 82 %
STRESS POST PEAK BP: NORMAL MMHG
STRESS POST PEAK HR: 105 BPM
STRESS TARGET HR: 128 BPM
STRESS TARGET HR: 128 BPM
TROPONIN T SERPL-MCNC: <0.01 NG/ML (ref 0–0.03)
TSH SERPL DL<=0.05 MIU/L-ACNC: 1.96 UIU/ML (ref 0.27–4.2)
UROBILINOGEN UR QL STRIP: NORMAL
VENTILATOR MODE: ABNORMAL
WBC NRBC COR # BLD: 8.09 10*3/MM3 (ref 3.4–10.8)

## 2020-01-13 PROCEDURE — 83690 ASSAY OF LIPASE: CPT | Performed by: FAMILY MEDICINE

## 2020-01-13 PROCEDURE — 84443 ASSAY THYROID STIM HORMONE: CPT | Performed by: PHYSICIAN ASSISTANT

## 2020-01-13 PROCEDURE — 86677 HELICOBACTER PYLORI ANTIBODY: CPT | Performed by: PHYSICIAN ASSISTANT

## 2020-01-13 PROCEDURE — 94799 UNLISTED PULMONARY SVC/PX: CPT

## 2020-01-13 PROCEDURE — 36415 COLL VENOUS BLD VENIPUNCTURE: CPT

## 2020-01-13 PROCEDURE — 99285 EMERGENCY DEPT VISIT HI MDM: CPT

## 2020-01-13 PROCEDURE — 99236 HOSP IP/OBS SAME DATE HI 85: CPT | Performed by: PHYSICIAN ASSISTANT

## 2020-01-13 PROCEDURE — 36600 WITHDRAWAL OF ARTERIAL BLOOD: CPT

## 2020-01-13 PROCEDURE — 93010 ELECTROCARDIOGRAM REPORT: CPT | Performed by: INTERNAL MEDICINE

## 2020-01-13 PROCEDURE — A9500 TC99M SESTAMIBI: HCPCS | Performed by: HOSPITALIST

## 2020-01-13 PROCEDURE — 93005 ELECTROCARDIOGRAM TRACING: CPT | Performed by: FAMILY MEDICINE

## 2020-01-13 PROCEDURE — 86140 C-REACTIVE PROTEIN: CPT | Performed by: FAMILY MEDICINE

## 2020-01-13 PROCEDURE — G0378 HOSPITAL OBSERVATION PER HR: HCPCS

## 2020-01-13 PROCEDURE — 85610 PROTHROMBIN TIME: CPT | Performed by: FAMILY MEDICINE

## 2020-01-13 PROCEDURE — 82805 BLOOD GASES W/O2 SATURATION: CPT

## 2020-01-13 PROCEDURE — 80053 COMPREHEN METABOLIC PANEL: CPT | Performed by: FAMILY MEDICINE

## 2020-01-13 PROCEDURE — 93018 CV STRESS TEST I&R ONLY: CPT | Performed by: INTERNAL MEDICINE

## 2020-01-13 PROCEDURE — 83880 ASSAY OF NATRIURETIC PEPTIDE: CPT | Performed by: FAMILY MEDICINE

## 2020-01-13 PROCEDURE — 0 TECHNETIUM SESTAMIBI: Performed by: HOSPITALIST

## 2020-01-13 PROCEDURE — 96372 THER/PROPH/DIAG INJ SC/IM: CPT

## 2020-01-13 PROCEDURE — 93017 CV STRESS TEST TRACING ONLY: CPT

## 2020-01-13 PROCEDURE — 25010000002 REGADENOSON 0.4 MG/5ML SOLUTION: Performed by: HOSPITALIST

## 2020-01-13 PROCEDURE — 83050 HGB METHEMOGLOBIN QUAN: CPT

## 2020-01-13 PROCEDURE — 93306 TTE W/DOPPLER COMPLETE: CPT | Performed by: INTERNAL MEDICINE

## 2020-01-13 PROCEDURE — 25010000002 HEPARIN (PORCINE) PER 1000 UNITS: Performed by: PHYSICIAN ASSISTANT

## 2020-01-13 PROCEDURE — 93306 TTE W/DOPPLER COMPLETE: CPT

## 2020-01-13 PROCEDURE — 85730 THROMBOPLASTIN TIME PARTIAL: CPT | Performed by: FAMILY MEDICINE

## 2020-01-13 PROCEDURE — 82962 GLUCOSE BLOOD TEST: CPT

## 2020-01-13 PROCEDURE — 85025 COMPLETE CBC W/AUTO DIFF WBC: CPT | Performed by: FAMILY MEDICINE

## 2020-01-13 PROCEDURE — 96374 THER/PROPH/DIAG INJ IV PUSH: CPT

## 2020-01-13 PROCEDURE — 78452 HT MUSCLE IMAGE SPECT MULT: CPT | Performed by: INTERNAL MEDICINE

## 2020-01-13 PROCEDURE — 84484 ASSAY OF TROPONIN QUANT: CPT | Performed by: FAMILY MEDICINE

## 2020-01-13 PROCEDURE — 71045 X-RAY EXAM CHEST 1 VIEW: CPT

## 2020-01-13 PROCEDURE — 81003 URINALYSIS AUTO W/O SCOPE: CPT | Performed by: FAMILY MEDICINE

## 2020-01-13 PROCEDURE — 78452 HT MUSCLE IMAGE SPECT MULT: CPT

## 2020-01-13 PROCEDURE — 82375 ASSAY CARBOXYHB QUANT: CPT

## 2020-01-13 PROCEDURE — 84484 ASSAY OF TROPONIN QUANT: CPT | Performed by: PHYSICIAN ASSISTANT

## 2020-01-13 PROCEDURE — 83036 HEMOGLOBIN GLYCOSYLATED A1C: CPT | Performed by: PHYSICIAN ASSISTANT

## 2020-01-13 RX ORDER — ASPIRIN 81 MG/1
81 TABLET ORAL NIGHTLY
Status: ON HOLD | COMMUNITY
End: 2022-01-29

## 2020-01-13 RX ORDER — FAMOTIDINE 10 MG/ML
20 INJECTION, SOLUTION INTRAVENOUS ONCE
Status: COMPLETED | OUTPATIENT
Start: 2020-01-13 | End: 2020-01-13

## 2020-01-13 RX ORDER — ASPIRIN 81 MG/1
81 TABLET ORAL DAILY
Status: DISCONTINUED | OUTPATIENT
Start: 2020-01-13 | End: 2020-01-13 | Stop reason: HOSPADM

## 2020-01-13 RX ORDER — SODIUM CHLORIDE 0.9 % (FLUSH) 0.9 %
10 SYRINGE (ML) INJECTION AS NEEDED
Status: DISCONTINUED | OUTPATIENT
Start: 2020-01-13 | End: 2020-01-13 | Stop reason: HOSPADM

## 2020-01-13 RX ORDER — ASPIRIN 325 MG
325 TABLET ORAL ONCE
Status: COMPLETED | OUTPATIENT
Start: 2020-01-13 | End: 2020-01-13

## 2020-01-13 RX ORDER — SODIUM CHLORIDE 0.9 % (FLUSH) 0.9 %
10 SYRINGE (ML) INJECTION EVERY 12 HOURS SCHEDULED
Status: DISCONTINUED | OUTPATIENT
Start: 2020-01-13 | End: 2020-01-13 | Stop reason: HOSPADM

## 2020-01-13 RX ORDER — PANTOPRAZOLE SODIUM 40 MG/1
40 TABLET, DELAYED RELEASE ORAL DAILY
Qty: 30 TABLET | Refills: 0 | Status: SHIPPED | OUTPATIENT
Start: 2020-01-13 | End: 2020-02-12

## 2020-01-13 RX ORDER — UBIDECARENONE 100 MG
100 CAPSULE ORAL DAILY
COMMUNITY

## 2020-01-13 RX ORDER — NITROGLYCERIN 0.4 MG/1
0.4 TABLET SUBLINGUAL
Status: DISCONTINUED | OUTPATIENT
Start: 2020-01-13 | End: 2020-01-13 | Stop reason: HOSPADM

## 2020-01-13 RX ORDER — ROSUVASTATIN CALCIUM 20 MG/1
20 TABLET, COATED ORAL NIGHTLY
Status: CANCELLED | OUTPATIENT
Start: 2020-01-13

## 2020-01-13 RX ORDER — ATORVASTATIN CALCIUM 40 MG/1
40 TABLET, FILM COATED ORAL NIGHTLY
Status: DISCONTINUED | OUTPATIENT
Start: 2020-01-13 | End: 2020-01-13 | Stop reason: HOSPADM

## 2020-01-13 RX ORDER — UREA 10 %
1 LOTION (ML) TOPICAL DAILY
Status: DISCONTINUED | OUTPATIENT
Start: 2020-01-13 | End: 2020-01-13 | Stop reason: HOSPADM

## 2020-01-13 RX ORDER — HYDRALAZINE HYDROCHLORIDE 20 MG/ML
10 INJECTION INTRAMUSCULAR; INTRAVENOUS EVERY 6 HOURS PRN
Status: DISCONTINUED | OUTPATIENT
Start: 2020-01-13 | End: 2020-01-13 | Stop reason: HOSPADM

## 2020-01-13 RX ORDER — NAPROXEN 250 MG/1
250 TABLET ORAL 2 TIMES DAILY WITH MEALS
Status: CANCELLED | OUTPATIENT
Start: 2020-01-13

## 2020-01-13 RX ORDER — METOPROLOL SUCCINATE 25 MG/1
25 TABLET, EXTENDED RELEASE ORAL NIGHTLY
Status: CANCELLED | OUTPATIENT
Start: 2020-01-13

## 2020-01-13 RX ORDER — NAPROXEN SODIUM 220 MG
220 TABLET ORAL 2 TIMES DAILY
COMMUNITY
End: 2020-01-13 | Stop reason: HOSPADM

## 2020-01-13 RX ORDER — METOPROLOL SUCCINATE 25 MG/1
25 TABLET, EXTENDED RELEASE ORAL NIGHTLY
Status: DISCONTINUED | OUTPATIENT
Start: 2020-01-13 | End: 2020-01-13 | Stop reason: HOSPADM

## 2020-01-13 RX ORDER — ALUMINA, MAGNESIA, AND SIMETHICONE 2400; 2400; 240 MG/30ML; MG/30ML; MG/30ML
15 SUSPENSION ORAL ONCE
Status: COMPLETED | OUTPATIENT
Start: 2020-01-13 | End: 2020-01-13

## 2020-01-13 RX ORDER — NICOTINE POLACRILEX 4 MG
15 LOZENGE BUCCAL
Status: DISCONTINUED | OUTPATIENT
Start: 2020-01-13 | End: 2020-01-13 | Stop reason: HOSPADM

## 2020-01-13 RX ORDER — DIPHENOXYLATE HYDROCHLORIDE AND ATROPINE SULFATE 2.5; .025 MG/1; MG/1
1 TABLET ORAL DAILY
Status: CANCELLED | OUTPATIENT
Start: 2020-01-13

## 2020-01-13 RX ORDER — DEXTROSE MONOHYDRATE 25 G/50ML
25 INJECTION, SOLUTION INTRAVENOUS
Status: DISCONTINUED | OUTPATIENT
Start: 2020-01-13 | End: 2020-01-13 | Stop reason: HOSPADM

## 2020-01-13 RX ORDER — ASPIRIN 81 MG/1
81 TABLET ORAL NIGHTLY
Status: CANCELLED | OUTPATIENT
Start: 2020-01-13

## 2020-01-13 RX ORDER — LIDOCAINE HYDROCHLORIDE 20 MG/ML
15 SOLUTION OROPHARYNGEAL ONCE
Status: COMPLETED | OUTPATIENT
Start: 2020-01-13 | End: 2020-01-13

## 2020-01-13 RX ORDER — HEPARIN SODIUM 5000 [USP'U]/ML
5000 INJECTION, SOLUTION INTRAVENOUS; SUBCUTANEOUS EVERY 12 HOURS SCHEDULED
Status: DISCONTINUED | OUTPATIENT
Start: 2020-01-13 | End: 2020-01-13 | Stop reason: HOSPADM

## 2020-01-13 RX ORDER — PANTOPRAZOLE SODIUM 40 MG/1
40 TABLET, DELAYED RELEASE ORAL
Status: DISCONTINUED | OUTPATIENT
Start: 2020-01-13 | End: 2020-01-13 | Stop reason: HOSPADM

## 2020-01-13 RX ORDER — PANTOPRAZOLE SODIUM 40 MG/1
40 TABLET, DELAYED RELEASE ORAL EVERY MORNING
Status: CANCELLED | OUTPATIENT
Start: 2020-01-13

## 2020-01-13 RX ADMIN — ASPIRIN 325 MG: 325 TABLET ORAL at 08:04

## 2020-01-13 RX ADMIN — REGADENOSON 0.4 MG: 0.08 INJECTION, SOLUTION INTRAVENOUS at 12:55

## 2020-01-13 RX ADMIN — PANTOPRAZOLE SODIUM 40 MG: 40 TABLET, DELAYED RELEASE ORAL at 11:25

## 2020-01-13 RX ADMIN — ALUMINUM HYDROXIDE, MAGNESIUM HYDROXIDE, AND DIMETHICONE 15 ML: 400; 400; 40 SUSPENSION ORAL at 05:20

## 2020-01-13 RX ADMIN — ASPIRIN 81 MG: 81 TABLET, COATED ORAL at 11:25

## 2020-01-13 RX ADMIN — LISINOPRIL: 10 TABLET ORAL at 17:06

## 2020-01-13 RX ADMIN — TECHNETIUM TC 99M SESTAMIBI 1 DOSE: 1 INJECTION INTRAVENOUS at 11:30

## 2020-01-13 RX ADMIN — LIDOCAINE HYDROCHLORIDE 15 ML: 20 SOLUTION ORAL; TOPICAL at 05:20

## 2020-01-13 RX ADMIN — HEPARIN SODIUM 5000 UNITS: 5000 INJECTION INTRAVENOUS; SUBCUTANEOUS at 11:25

## 2020-01-13 RX ADMIN — FAMOTIDINE 20 MG: 10 INJECTION INTRAVENOUS at 08:04

## 2020-01-13 RX ADMIN — TECHNETIUM TC 99M SESTAMIBI 1 DOSE: 1 INJECTION INTRAVENOUS at 12:55

## 2020-01-13 RX ADMIN — SODIUM CHLORIDE, PRESERVATIVE FREE 10 ML: 5 INJECTION INTRAVENOUS at 11:25

## 2020-01-13 RX ADMIN — Medication 1 TABLET: at 17:06

## 2020-01-13 NOTE — H&P
"    [facility] Medicine Services  History & Physical    Patient Identification:  Name:  Nakia Mondragon  Age:  69 y.o.  Sex:  female  :  1950  MRN:  [id number]   Visit Number:  [id number]  Primary Care Physician:  Judy Ku MD    I have seen the patient in conjunction with Miladys Henao PA-C and I agree with the following statements:    Subjective     Chief complaint: Severe heartburn, chest pain    History of presenting illness:      Nakia Mondragon is a 69 y.o. female with past medical history significant for gastroesophageal reflux disease, hyperlipidemia, hypertension, and arthritis.  She presented to the emergency department at [facility] on 2020 for further evaluation of chest pain and acid reflux.  She reportedly has acid reflux on Dexilant therapy but has had difficulty affording this and has been out.  As such, she has been taking over-the-counter omeprazole.  She has been experiencing severe heartburn and reportedly woke up coughing up stomach acid and was unable to breathe.  She took some Tums which did help to some degree but it did recur resulting in presentation to the emergency department.    She reports awaking with dyspnea as she was choking on stomach acid. She is out of her Dexilant and has been taking OTC Prilosec secondary to cost.  She reports a \"fire\" feeling in her stomach radiating into her chest. Her last stress test was with Dr. Izaguirre 2-3 years ago and was unremarkable. Her last cardiac catheterization was years back with Dr.Michael Nassar and was unremarkable.She reports she has had prior history of H.pylori in the distant past. She also reports EGD with Dr. Angel Cristobal 2-3 years ago with esophageal irritation. She reports she has arthritis in her hips and knees with difficulty with dyspnea on exertion. She reports her initial stress test was performed a few years back due to worsening dyspnea exertion.  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    Upon arrival to the ED, vital signs were T 98.2F, HR 87, RR 18, and /84.  She takes Lisinopril at home and has missed her AM dose.     Known Emergency Department medications received prior to my evaluation included Maalox, famotidine, lidocaine (helped), and aspirin 325mg. Emergency Department Room location at the time of my evaluation was ED 1 bed 13.     ---------------------------------------------------------------------------------------------------------------------   Review of Systems   Constitutional: Negative for activity change and appetite change.   HENT: Negative for congestion.    Eyes: Negative for pain and discharge.   Respiratory: Positive for shortness of breath. Negative for stridor.    Cardiovascular: Positive for chest pain.   Gastrointestinal: Positive for abdominal distention. Negative for diarrhea, nausea and vomiting.   Endocrine: Negative for cold intolerance and heat intolerance.   Genitourinary: Negative for difficulty urinating and dysuria.   Musculoskeletal: Negative for arthralgias and gait problem.   Skin: Negative for color change and pallor.   Allergic/Immunologic: Negative for environmental allergies and food allergies.   Neurological: Negative for dizziness and headaches.   Psychiatric/Behavioral: Negative for agitation and confusion.      ---------------------------------------------------------------------------------------------------------------------   Past Medical History:   Diagnosis Date   • Arthritis    • Hyperlipidemia    • Hypertension      Past Surgical History:   Procedure Laterality Date   • HYSTERECTOMY       History reviewed. No pertinent family history.  Social History     Socioeconomic History   • Marital status:      Spouse name: Not on file   • Number of children: Not on file   • Years of education: Not on file   • Highest education level: Not on file   Tobacco Use   • Smoking status: Never  Smoker   Substance and Sexual Activity   • Alcohol use: No   • Drug use: No     ---------------------------------------------------------------------------------------------------------------------   Allergies:  Patient has no known allergies.  ---------------------------------------------------------------------------------------------------------------------   Home medications:    Medications below are reported home medications pulling from within the system; at this time, these medications have not been reconciled unless otherwise specified and are in the verification process for further verifcation as current home medications.    (Not in a hospital admission)    Hospital Scheduled Meds:          Current listed hospital scheduled medications may not yet reflect those currently placed in orders that are signed and held awaiting patient's arrival to floor.   ---------------------------------------------------------------------------------------------------------------------     Objective     Vital Signs:  Temp:  [98.2 °F (36.8 °C)] 98.2 °F (36.8 °C)  Heart Rate:  [74-87] 81  Resp:  [18] 18  BP: (128-182)/(69-91) 152/91      01/13/20  0442   Weight: 74.4 kg (164 lb)     Body mass index is 30.99 kg/m².  ---------------------------------------------------------------------------------------------------------------------       Physical Exam   Constitutional: She is oriented to person, place, and time. She appears well-developed.   HENT:   Head: Normocephalic and atraumatic.   Eyes: Pupils are equal, round, and reactive to light. EOM are normal.   Neck: Normal range of motion.   Cardiovascular: Normal rate and regular rhythm. Exam reveals no friction rub.   Pulmonary/Chest: Effort normal. No respiratory distress. She has no wheezes.   Abdominal: Soft. Bowel sounds are normal. She exhibits no distension. There is tenderness.   Musculoskeletal: Normal range of motion. She exhibits no edema.   Neurological: She is alert  and oriented to person, place, and time.   Skin: Skin is warm and dry.   Psychiatric: She has a normal mood and affect. Her behavior is normal.         ---------------------------------------------------------------------------------------------------------------------  EKG:                        I have personally reivewed this EKG.   ---------------------------------------------------------------------------------------------------------------------   Results from last 7 days   Lab Units 01/13/20 0550 01/13/20 0518   CRP mg/dL  --  <0.03   WBC 10*3/mm3  --  8.09   HEMOGLOBIN g/dL  --  13.5   HEMATOCRIT %  --  40.5   MCV fL  --  90.2   MCHC g/dL  --  33.3   PLATELETS 10*3/mm3  --  253   INR  0.92  --      Results from last 7 days   Lab Units 01/13/20 0524   PH, ARTERIAL pH units 7.428   PO2 ART mm Hg 71.7*   PCO2, ARTERIAL mm Hg 38.7   HCO3 ART mmol/L 25.5     Results from last 7 days   Lab Units 01/13/20 0518   SODIUM mmol/L 143   POTASSIUM mmol/L 3.7   CHLORIDE mmol/L 103   CO2 mmol/L 24.8   BUN mg/dL 15   CREATININE mg/dL 0.90   EGFR IF NONAFRICN AM mL/min/1.73 62   CALCIUM mg/dL 10.0   GLUCOSE mg/dL 105*   ALBUMIN g/dL 4.29   BILIRUBIN mg/dL 0.2   ALK PHOS U/L 68   AST (SGOT) U/L 23   ALT (SGPT) U/L 20   Estimated Creatinine Clearance: 53.6 mL/min (by C-G formula based on SCr of 0.9 mg/dL).  No results found for: AMMONIA  Results from last 7 days   Lab Units 01/13/20  0725 01/13/20  0518   TROPONIN T ng/mL <0.010 <0.010     Results from last 7 days   Lab Units 01/13/20  0518   PROBNP pg/mL 57.8     No results found for: HGBA1C  No results found for: TSH, FREET4  No results found for: PREGTESTUR, PREGSERUM, HCG, HCGQUANT  Pain Management Panel     There is no flowsheet data to display.        No results found for: BLOODCX  No results found for: URINECX  No results found for: WOUNDCX  No results found for: STOOLCX       ---------------------------------------------------------------------------------------------------------------------  Imaging Results (Last 7 Days)     Procedure Component Value Units Date/Time    XR Chest 1 View [[id number]] Collected:  01/13/20 0525     Updated:  01/13/20 0527    Narrative:       CR Chest 1 Vw    INDICATION:   69-year-old female with acid reflux for several days.     COMPARISON:    Chest 7/29/2019    FINDINGS:  Single portable AP view(s) of the chest.  The heart and mediastinal contours are normal. The lungs are clear. No pneumothorax or pleural effusion.      Impression:       No acute cardiopulmonary findings.    Signer Name: Toan Crowell MD   Signed: 1/13/2020 5:25 AM   Workstation Name: [email or web address]    [facility]          Cultures:  No results found for: BLOODCX, URINECX, WOUNDCX, MRSACX, RESPCX, STOOLCX    Last echocardiogram:  Results for orders placed during the hospital encounter of 12/13/17   Adult Transthoracic Echo Complete W/ Cont if Necessary Per Protocol    Narrative · Left ventricular systolic function is normal. Estimated EF = 65-70%.  · All left ventricular wall segments contract normally.  · Left ventricular diastolic dysfunction (grade I) consistent with   impaired relaxation.  · The aortic valve is structurally normal. No aortic valve regurgitation   is present. No aortic valve stenosis is present.  · Mild mitral valve regurgitation is present  · Mild tricuspid valve regurgitation is present.  · Mild pulmonary hypertension is present.  · Mild pulmonic valve regurgitation is present.  · There is no evidence of pericardial effusion              I have personally reviewed the above radiology images and read the final radiology report on 01/13/20  ---------------------------------------------------------------------------------------------------------------------  Assessment / Plan     Active Hospital Problems    Diagnosis POA   • Chest pain with low  risk for cardiac etiology [R07.9] Yes       ASSESSMENT/PLAN:  · Chest pain r/o MI:  Aspirin 81mg and atorvastatin ordered.Lipid panel added. TSH added and hemoglobin a1c (reportedly 5.8 recently).  Echocardiogram has been ordered in addition to stress test (treadmill with possible pharmacological due to osteoarthritis and unclear if she can tolerate treadmill stress).  She reports prior cardiac catheterizations were reportedly clean with last greater than 5 years ago.  Her last stress test around 2-3 years ago with Dr. Izaguirre was unremarkable.     · GERD: Protonix 40mg added. H.pylori IgM added.  Recommend outpatient follow-up with her GI specialist  upon discharge for possible EGD and to be restarted on Dexilant.     · Uncontrolled hypertension, improving:  Will add PRN Hydralazine 10mg q6 PRN and continue to monitor.    · Hyperlipidemia:  Lipid panel added.     · Borderline diabetes mellitus: Hemoglobin a1c added. Glucose okay. She reports she watched her diet and a1c was recently 5.8.       ----------  -DVT prophylaxis: SQ heparin  -Activity: Ad aguila  -Expected length of stay:   OBSERVATION status; however, if further evaluation or treatment plans warrant, status may change.  Based upon current information, I predict patient's care encounter to be less than or equal to 2 midnights        High risk secondary to  Chest pain r/o MI    Miladys Henao PA-C  01/13/20  9:08 AM  Pager # [phone]  ---------------------------------------------------------------------------------------------------------------------

## 2020-01-13 NOTE — ED NOTES
Pt leaving ED at this time via wheelchair with Christina (ED tech). Pt A&Ox4. VSS. No acute respiratory distress noted. Respirations even and nonlabored. Pt denies pain, needs, or concerns. IV patent and intact. Belongings sent with pt.     Corazon Grijalva, RN  01/13/20 5596

## 2020-01-13 NOTE — DISCHARGE SUMMARY
[facility]  DISCHARGE SUMMARY    Date of Admission: 1/13/2020    Date of Discharge:  1/14/2020    PCP: Judy Ku MD    Discharging Provider: Miladys Henao PA-C  Attending Physician on day of DC: Dr. Mitchell    Admission Diagnosis:   Please see admission H&P    Discharge Diagnosis:   · Atypical chest pain   · GERD  · Uncontrolled hypertension, improved  · Hyperlipidemia  · Borderline diabetes mellitus type 2  · Obesity with BMI 31.48kg/m2  · Bilateral hip osteoarthritis      Procedures Performed:  · Stress test on 1/13/2020: Interpretation Summary     · A pharmacological stress test was performed using regadenoson with low-level exercise.  · Findings consistent with a normal ECG stress test.  · Myocardial perfusion imaging indicates a normal myocardial perfusion study with no evidence of ischemia.  · Normal LV cavity size. Normal LV wall motion noted.  · Left ventricular ejection fraction is hyperdynamic (Calculated EF > 70%).  · Impressions are consistent with a low risk study.     ·       HPI     History of Present Illness:  Nakia Mondragon is a 69 y.o. female with past medical history significant for gastroesophageal reflux disease, hyperlipidemia, hypertension, and arthritis.  She presented to the emergency department at [facility] on January 13, 2020 for further evaluation of chest pain and acid reflux.  She reportedly has acid reflux on Dexilant therapy but has had difficulty affording this and has been out. Troponin T and EKG were unremarkable; however, it was felt that Mrs. Mondragon could benefit from further observation.         Hospital Course     Hospital Course  Nakia Mondragon was admitted as outlined in above HPI. She was started on aspirin 81mg and atorvastatin. Stress test was ordered with treadmill vs. Nuclear; however, given osteoarthritis of bilateral hips and dyspnea on exertion, Mrs. Mondragon was unable to tolerate  treadmill.  Her stress test ultimately revealed a low risk study. Echocardiogram was also unremarkable with exception of grade I diastolic dysfunction with impaired relaxation.      Mrs. Mondragon did not have further episodes of chest pain during her stay.  She felt improved. She did also receive Protonix.  H.pylori IgM blood testing was pending at the time of discharge.     It was recommended she follow-up outpatient with her GI Specialist Dr. Cristobal with possible EGD given prior history of GERD and esophagitis with worsening. She was also counseled to avoid aleve and continue aspirin as NSAID use could be further impacting her GERD.  A prescription of Protonix was provided until she could be evaluated by GI for further PPI recommendations.  She does sleep on 3 pillows at night to assist with her GERD.     She expressed understanding of treatment plan and felt improved at the time of discharge.     One week follow-up with PCP was recommended. H.pylori remains pending and will need further followed up.      Discussed with LUIS Mahoney on day of discharge.         Pertinent Laboratory and Radiology Results     Pertinent Test Results:      Results from last 7 days   Lab Units 01/13/20  0524   PH, ARTERIAL pH units 7.428   PO2 ART mm Hg 71.7*   PCO2, ARTERIAL mm Hg 38.7   HCO3 ART mmol/L 25.5     Results from last 7 days   Lab Units 01/13/20  0518   WBC 10*3/mm3 8.09   HEMOGLOBIN g/dL 13.5   HEMATOCRIT % 40.5   PLATELETS 10*3/mm3 253   MCV fL 90.2   SODIUM mmol/L 143   POTASSIUM mmol/L 3.7   CHLORIDE mmol/L 103   CO2 mmol/L 24.8   BUN mg/dL 15   CREATININE mg/dL 0.90   GLUCOSE mg/dL 105*   CALCIUM mg/dL 10.0        Results from last 7 days   Lab Units 01/13/20  1518 01/13/20  0949 01/13/20  0725 01/13/20  0518   TROPONIN T ng/mL <0.010 <0.010 <0.010 <0.010   PROBNP pg/mL  --   --   --  57.8     Results from last 7 days   Lab Units 01/13/20  0518   CRP mg/dL <0.03   WBC 10*3/mm3 8.09     Results from last 7 days   Lab  Units 01/13/20  0550 01/13/20  0518   BILIRUBIN mg/dL  --  0.2   ALK PHOS U/L  --  68   ALT (SGPT) U/L  --  20   AST (SGOT) U/L  --  23   PROTIME Seconds 12.9  --    INR  0.92  --    APTT seconds 26.7  --            Invalid input(s): TG, LDLCALC, LDLREALC  Results from last 7 days   Lab Units 01/13/20  0725 01/13/20  0518   TSH uIU/mL 1.960  --    HEMOGLOBIN A1C %  --  5.60     Brief Urine Lab Results  (Last result in the past 365 days)      Color   Clarity   Blood   Leuk Est   Nitrite   Protein   CREAT   Urine HCG        01/13/20 0550 Yellow Clear Negative Negative Negative Negative                        Results for orders placed during the hospital encounter of 01/13/20   Adult Transthoracic Echo Complete W/ Cont if Necessary Per Protocol    Narrative · Normal left ventricular cavity size and wall thickness noted. All left   ventricular wall segments contract normally.  · Estimated EF appears to be in the range of 61 - 65%.  · Left ventricular diastolic dysfunction (grade I) consistent with   impaired relaxation.  · The aortic valve is structurally normal. No aortic valve regurgitation   is present. No aortic valve stenosis is present.  · The mitral valve is normal in structure. Mild mitral valve regurgitation   is present. No significant mitral valve stenosis is present.  · The tricuspid valve is normal. Mild tricuspid valve regurgitation is   present. Estimated right ventricular systolic pressure from tricuspid   regurgitation is mildly elevated (35-45 mmHg).  · Mild pulmonary hypertension is present.  · The pericardium is normal. There is no evidence of pericardial effusion.           Order Current Status    H. Pylori IgM, Blood In process            ----------------------------------------------------------------------------------------------------------------------  Xr Chest 1 View    Result Date: 1/13/2020  No acute cardiopulmonary findings. Signer Name: Toan Crowell MD  Signed: 1/13/2020 5:25 AM   Workstation Name: [id number]  [facility]    Xr Hips Bilateral With Or Without Pelvis 5 View    Result Date: 1/2/2020  Bilateral osteoarthritis in the hips.  This report was finalized on 1/2/2020 4:02 PM by Dr. Vinny Herndon II, MD.          Microbiology Results (last 10 days)     ** No results found for the last 240 hours. **          Labs above have been reviewed on the day of discharge.  Radiology images from prior 30 days were reviewed prior to discharge as incorporated into this document.     Discharge Vitals and Physical Examination       Vital Signs  Temp:  [97.7 °F (36.5 °C)] 97.7 °F (36.5 °C)  Heart Rate:  [73-75] 75  Resp:  [18-20] 20  BP: (157-159)/(82-85) 157/82     PHYSICAL EXAMINATION:   Physical Exam   Constitutional: She is oriented to person, place, and time and well-developed, well-nourished, and in no distress.   HENT:   Head: Normocephalic and atraumatic.   Eyes: Pupils are equal, round, and reactive to light. EOM are normal.   Neck: Normal range of motion.   Cardiovascular: Normal rate and regular rhythm.   Pulmonary/Chest: Effort normal.   Abdominal: Soft. Bowel sounds are normal.   Musculoskeletal: She exhibits no edema or deformity.   Neurological: She is alert and oriented to person, place, and time.   Skin: Skin is warm and dry.   Psychiatric: Affect and judgment normal.         Discharge Disposition, Discharge Medications, and Discharge Appointments     Discharge Disposition:   home    Condition on Discharge:  Fair    Discharge Medications:     Discharge Medications      New Medications      Instructions Start Date   pantoprazole 40 MG EC tablet  Commonly known as:  PROTONIX   40 mg, Oral, Daily         Continue These Medications      Instructions Start Date   aspirin 81 MG EC tablet   81 mg, Oral, Nightly      coenzyme Q10 100 MG capsule   100 mg, Oral, Daily      lisinopril-hydrochlorothiazide 20-12.5 MG per tablet  Commonly known as:  PRINZIDE,ZESTORETIC   1  tablet, Oral, Daily      metoprolol succinate XL 25 MG 24 hr tablet  Commonly known as:  TOPROL-XL   25 mg, Oral, Nightly      multivitamin with minerals tablet tablet   1 tablet, Oral, Daily      rosuvastatin 20 MG tablet  Commonly known as:  CRESTOR   20 mg, Oral, Nightly      Semaglutide (1 MG/DOSE) 2 MG/1.5ML solution pen-injector  Commonly known as:  OZEMPIC   1 mg, Subcutaneous, Weekly, saturday          Stop These Medications    naproxen sodium 220 MG tablet  Commonly known as:  ALEVE     omeprazole 20 MG capsule  Commonly known as:  priLOSEC            Discharged medication regimen discussed with attending physician prior to discharge.     Discharge Diet:   cardiac diet      Activity at Discharge:  activity as tolerated         Discharge Disposition:    Home or Self Care        Follow-up Appointments:      Additional Instructions for the Follow-ups that You Need to Schedule     Discharge Follow-up with PCP   As directed       Currently Documented PCP:    Judy Ku MD    PCP Phone Number:    [phone]     Follow Up Details:  One week follow-up for chest pain           Follow-up Information     Judy Ku MD .    Specialty:  Geriatric Medicine  Why:  One week follow-up for chest pain  Contact information:  [address] [place] [address]  [phone]                   Additional Instructions for the Follow-ups that You Need to Schedule     Discharge Follow-up with PCP   As directed       Currently Documented PCP:    Judy Ku MD    PCP Phone Number:    [phone]     Follow Up Details:  One week follow-up for chest pain               Test Results Pending at Discharge:     Order Current Status    H. Pylori IgM, Blood In process             Miladys Henao PA-C  [facility] Medicine Team  01/14/20  9:57 AM      Time: Greater than 30 minutes spent on this discharge.

## 2020-01-13 NOTE — ED PROVIDER NOTES
Subjective   69-year-old white female with past medical history of hypertension, hyperlipidemia as well as being prediabetic presents the emergency department complaining of severe heartburn patient says she has gastroesophageal reflux disease and she takes Dexilant for it but is been out of that so she been taking over-the-counter Prilosec due to the price of the prescription but she has been having to sleep on pillows because she cannot lay flat because it burns up into her chest and then she gets short of breath.  Son who is with her reports that for the last several months she has been having worsening shortness of breath on exertion.  Patient reports that she laid down last night and only had one pillow under her and woke up about 2 hours after going to sleep with the worst heartburn of her life almost like a heaviness in her chest that caused her eyes to water she started coughing and became short of breath and started coughing up thick yellow what she thinks is it is pure acid.  After it continued on for several minutes she called her son who convinced her to come to the emergency department to be evaluated.  Patient reports that her she has had a heart cath before because she saw her PCP and told her that she was basically having dyspnea with any type of exertion and the stress test that was performed showed low risk.  However patient says she gets concerned because both of her parents had cardiac disease as well as all of her siblings.      History provided by:  Patient  Chest Pain   Pain location:  Substernal area  Pain quality: burning and pressure    Pain radiates to:  Does not radiate  Pain severity:  Moderate  Onset quality:  Sudden  Timing:  Constant  Progression:  Unchanged  Chronicity:  Recurrent  Context: at rest    Relieved by:  Nothing  Worsened by:  Certain positions  Associated symptoms: nausea and shortness of breath    Associated symptoms: no abdominal pain and no fever    Risk factors: high  cholesterol and hypertension        Review of Systems   Constitutional: Negative.  Negative for fever.   HENT: Negative.    Respiratory: Positive for shortness of breath.    Cardiovascular: Positive for chest pain.   Gastrointestinal: Positive for nausea. Negative for abdominal pain.   Endocrine: Negative.    Genitourinary: Negative.  Negative for dysuria.   Skin: Negative.    Neurological: Negative.    Psychiatric/Behavioral: Negative.    All other systems reviewed and are negative.      Past Medical History:   Diagnosis Date   • Arthritis    • Hyperlipidemia    • Hypertension        Allergies   Allergen Reactions   • Bactrim [Sulfamethoxazole-Trimethoprim] Rash       Past Surgical History:   Procedure Laterality Date   • HYSTERECTOMY         History reviewed. No pertinent family history.    Social History     Socioeconomic History   • Marital status:      Spouse name: Not on file   • Number of children: Not on file   • Years of education: Not on file   • Highest education level: Not on file   Tobacco Use   • Smoking status: Never Smoker   Substance and Sexual Activity   • Alcohol use: No   • Drug use: No           Objective   Physical Exam   Constitutional: She is oriented to person, place, and time. She appears well-developed and well-nourished.   HENT:   Head: Normocephalic and atraumatic.   Right Ear: External ear normal.   Left Ear: External ear normal.   Nose: Nose normal.   Mouth/Throat: Oropharynx is clear and moist.   Eyes: Pupils are equal, round, and reactive to light. EOM are normal.   Cardiovascular: Normal rate and regular rhythm.   Pulmonary/Chest: Effort normal and breath sounds normal.   Abdominal: Soft. Bowel sounds are normal.   Musculoskeletal: Normal range of motion.   Neurological: She is alert and oriented to person, place, and time.   Skin: Skin is warm. Capillary refill takes less than 2 seconds.   Psychiatric: She has a normal mood and affect. Her behavior is normal. Judgment and  thought content normal.   Nursing note and vitals reviewed.      Procedures           ED Course  ED Course as of Jan 13 2133   Mon Jan 13, 2020   0707 EKG interpretation time is 455 normal sinus rhythm 80 bpm QRS duration is 84 QT is 370 QTC is 426 no evidence of acute ST elevation or depression.    []   0712 Heart score 2    []      ED Course User Index  [] Nickie Stover DO                                               MDM  Number of Diagnoses or Management Options  Chest pain with low risk for cardiac etiology: new and requires workup     Amount and/or Complexity of Data Reviewed  Clinical lab tests: ordered and reviewed  Tests in the radiology section of CPT®: reviewed and ordered  Tests in the medicine section of CPT®: reviewed and ordered  Discuss the patient with other providers: yes  Independent visualization of images, tracings, or specimens: yes    Risk of Complications, Morbidity, and/or Mortality  Presenting problems: high  Diagnostic procedures: high  Management options: high    Patient Progress  Patient progress: (guarded)      Final diagnoses:   Chest pain with low risk for cardiac etiology            Nickie Stover DO  01/13/20 2133

## 2020-01-13 NOTE — PLAN OF CARE
Problem: Patient Care Overview  Goal: Plan of Care Review  Outcome: Ongoing (interventions implemented as appropriate)   Pt resting in bed with multiple family members present and interested in care. Pt has no complaints at this time. Awaiting test results.

## 2020-01-13 NOTE — ED NOTES
EKG performed at 04:55a.m. and handed to Dr Stover at 04:57a.m.     Cindi Mendoza, RN  01/13/20 3785

## 2020-01-14 LAB — H PYLORI IGM SER-ACNC: <9 UNITS (ref 0–8.9)

## 2020-01-20 ENCOUNTER — TRANSCRIBE ORDERS (OUTPATIENT)
Dept: ADMINISTRATIVE | Facility: HOSPITAL | Age: 70
End: 2020-01-20

## 2020-01-20 ENCOUNTER — HOSPITAL ENCOUNTER (OUTPATIENT)
Dept: GENERAL RADIOLOGY | Facility: HOSPITAL | Age: 70
Discharge: HOME | End: 2020-01-20
Admitting: INTERNAL MEDICINE

## 2020-01-20 ENCOUNTER — LAB (OUTPATIENT)
Dept: LAB | Facility: HOSPITAL | Age: 70
End: 2020-01-20

## 2020-01-20 DIAGNOSIS — R05.9 COUGH: Primary | ICD-10-CM

## 2020-01-20 DIAGNOSIS — R05.9 COUGH: ICD-10-CM

## 2020-01-20 LAB

## 2020-01-20 PROCEDURE — 71046 X-RAY EXAM CHEST 2 VIEWS: CPT | Performed by: RADIOLOGY

## 2020-01-20 PROCEDURE — 0099U HC BIOFIRE FILMARRAY RESP PANEL 1: CPT

## 2020-01-20 PROCEDURE — 71046 X-RAY EXAM CHEST 2 VIEWS: CPT

## 2020-11-04 ENCOUNTER — APPOINTMENT (OUTPATIENT)
Dept: MAMMOGRAPHY | Facility: HOSPITAL | Age: 70
End: 2020-11-04

## 2020-11-24 ENCOUNTER — HOSPITAL ENCOUNTER (OUTPATIENT)
Dept: MAMMOGRAPHY | Facility: HOSPITAL | Age: 70
Discharge: HOME | End: 2020-11-24
Admitting: INTERNAL MEDICINE

## 2020-11-24 DIAGNOSIS — Z12.31 VISIT FOR SCREENING MAMMOGRAM: ICD-10-CM

## 2020-11-24 PROCEDURE — 77067 SCR MAMMO BI INCL CAD: CPT | Performed by: RADIOLOGY

## 2020-11-24 PROCEDURE — 77063 BREAST TOMOSYNTHESIS BI: CPT | Performed by: RADIOLOGY

## 2020-11-24 PROCEDURE — 77063 BREAST TOMOSYNTHESIS BI: CPT

## 2020-11-24 PROCEDURE — 77067 SCR MAMMO BI INCL CAD: CPT

## 2020-12-17 ENCOUNTER — TRANSCRIBE ORDERS (OUTPATIENT)
Dept: ADMINISTRATIVE | Facility: HOSPITAL | Age: 70
End: 2020-12-17

## 2020-12-17 DIAGNOSIS — R07.9 CHEST PAIN, UNSPECIFIED TYPE: ICD-10-CM

## 2020-12-17 DIAGNOSIS — R59.0 VIRCHOW'S NODE: Primary | ICD-10-CM

## 2020-12-18 ENCOUNTER — HOSPITAL ENCOUNTER (OUTPATIENT)
Dept: CT IMAGING | Facility: HOSPITAL | Age: 70
Discharge: HOME | End: 2020-12-18

## 2020-12-18 DIAGNOSIS — R07.9 CHEST PAIN, UNSPECIFIED TYPE: ICD-10-CM

## 2020-12-18 DIAGNOSIS — R59.0 VIRCHOW'S NODE: ICD-10-CM

## 2020-12-18 LAB — CREAT BLDA-MCNC: 0.7 MG/DL (ref 0.6–1.3)

## 2020-12-18 PROCEDURE — 25010000002 IOPAMIDOL 61 % SOLUTION: Performed by: INTERNAL MEDICINE

## 2020-12-18 PROCEDURE — 71260 CT THORAX DX C+: CPT | Performed by: RADIOLOGY

## 2020-12-18 PROCEDURE — 71260 CT THORAX DX C+: CPT

## 2020-12-18 PROCEDURE — 70491 CT SOFT TISSUE NECK W/DYE: CPT

## 2020-12-18 PROCEDURE — 82565 ASSAY OF CREATININE: CPT

## 2020-12-18 PROCEDURE — 70491 CT SOFT TISSUE NECK W/DYE: CPT | Performed by: RADIOLOGY

## 2020-12-18 RX ADMIN — IOPAMIDOL 80 ML: 612 INJECTION, SOLUTION INTRAVENOUS at 09:32

## 2021-04-22 ENCOUNTER — OFFICE VISIT (OUTPATIENT)
Dept: CARDIOLOGY | Facility: CLINIC | Age: 71
End: 2021-04-22

## 2021-04-22 VITALS
HEIGHT: 61 IN | SYSTOLIC BLOOD PRESSURE: 144 MMHG | DIASTOLIC BLOOD PRESSURE: 72 MMHG | WEIGHT: 167 LBS | OXYGEN SATURATION: 96 % | HEART RATE: 73 BPM | BODY MASS INDEX: 31.53 KG/M2

## 2021-04-22 DIAGNOSIS — R07.9 EXERTIONAL CHEST PAIN: Primary | ICD-10-CM

## 2021-04-22 DIAGNOSIS — I10 ESSENTIAL HYPERTENSION: ICD-10-CM

## 2021-04-22 DIAGNOSIS — E11.9 TYPE 2 DIABETES MELLITUS WITHOUT COMPLICATION, WITHOUT LONG-TERM CURRENT USE OF INSULIN (HCC): ICD-10-CM

## 2021-04-22 DIAGNOSIS — R06.09 DYSPNEA ON EXERTION: ICD-10-CM

## 2021-04-22 DIAGNOSIS — E78.2 MIXED HYPERLIPIDEMIA: ICD-10-CM

## 2021-04-22 PROCEDURE — 93000 ELECTROCARDIOGRAM COMPLETE: CPT | Performed by: PHYSICIAN ASSISTANT

## 2021-04-22 PROCEDURE — 99204 OFFICE O/P NEW MOD 45 MIN: CPT | Performed by: INTERNAL MEDICINE

## 2021-04-22 RX ORDER — CYANOCOBALAMIN 1000 UG/ML
1 INJECTION, SOLUTION INTRAMUSCULAR; SUBCUTANEOUS
Status: ON HOLD | COMMUNITY
Start: 2021-02-10 | End: 2022-01-29

## 2021-04-22 RX ORDER — ACETAMINOPHEN 500 MG
500 TABLET ORAL EVERY 6 HOURS PRN
Status: ON HOLD | COMMUNITY
End: 2022-01-29

## 2021-04-22 RX ORDER — DEXLANSOPRAZOLE 60 MG/1
60 CAPSULE, DELAYED RELEASE ORAL DAILY
Status: ON HOLD | COMMUNITY
End: 2022-01-29

## 2021-04-22 RX ORDER — GABAPENTIN 100 MG/1
300 CAPSULE ORAL DAILY
Status: ON HOLD | COMMUNITY
End: 2022-01-29

## 2021-04-22 NOTE — PROGRESS NOTES
[facility] at   INITIAL OFFICE CONSULT    Nakia Mondragon  : 1950  MRN:[id number]  Patient Address: [address]  [facility] [address]    Date of Encounter: 2021    PCP: Judy Ku MD  [address] [place] [address]  Referring MD: Dr. Ku     IDENTIFICATION: A 70 y.o. female resident of [place], [place]     Chief Complaint   Patient presents with   • Chest Pain   • Shortness of Breath     PROBLEM LIST:   1. Chest pain on exertion   a. Normal coronaries and LV by cath   b. [facility] for abnormal exercise study  (ST depression with normal perfusion): Normal LV function, minor nonobstructive CAD   c. Stress MPS 2020: no ischemia, LVEF >70%  d. Echo 2020: EF 61-65%, no valvular abnormalities    e. Symptoms of exertional angina Spring 2021  2. Hypertension   3. Hyperlipidemia  4. DM2   5. Family history of premature CAD   6. GERD  7. Covid 19 infection 2020    ALLERGIES:   Allergies   Allergen Reactions   • Bactrim [Sulfamethoxazole-Trimethoprim] Rash   • Ciprofloxacin Rash       CURRENT MEDICATIONS:   Current Outpatient Medications   Medication Instructions   • acetaminophen (TYLENOL) 500 mg, Oral, Every 6 Hours PRN   • aspirin 81 mg, Oral, Nightly   • coenzyme Q10 100 mg, Oral, Daily   • cyanocobalamin 1000 MCG/ML injection 1 mL, Intramuscular, Every 30 Days   • dexlansoprazole (DEXILANT) 60 mg, Oral, Daily   • gabapentin (NEURONTIN) 100 mg, Oral, 2 times daily   • lisinopril-hydrochlorothiazide (PRINZIDE,ZESTORETIC) 20-12.5 MG per tablet 1 tablet, Oral, Daily   • metoprolol succinate XL (TOPROL-XL) 25 mg, Oral, Nightly   • rosuvastatin (CRESTOR) 20 mg, Oral, Nightly   • Semaglutide (1 MG/DOSE) (OZEMPIC) 1 mg, Subcutaneous, Weekly, saturday        HPI: Mrs. Mondragon is a pleasant 69 y/o WF with above noted history who is referred in consultation for exertional chest pain. She notes mid-sternal chest pressure/heaviness with over exertion that  radiates across her upper chest and into bilateral shoulders. This is associated with shortness of breath, and resolves with rest within 5 minutes. She has had these symptoms for the past few years, and had a normal stress MPS last year. However due to ongoing symptoms concerning for exertional angina she was referred for further evaluation. She notes her symptoms start about 5-10 minutes in while walking on a treadmill or track. She has to stop and rest for 5 minutes and is then able to finish her walk. She denies any rest or nocturnal symptoms. She had Covid back in October and did not require hospitalization. Family history is significant for premature CAD in her mother, grandmother and siblings, as well as her son who had his first MI at 30 and had CABG in his late 30s. She has previously had 2 normal heart caths, in  and  which were done for abnormal stress tests per patient history.     Cardiac Risk Factors include: advanced age (older than 55 for men, 65 for women), diabetes mellitus, dyslipidemia, family history of premature cardiovascular disease, hypertension, obesity (BMI >= 30 kg/m2) and sedentary lifestyle    ROS: All systems have been reviewed and are negative with the exception of those mentioned in the HPI and problem list above.    Surgical History:   Past Surgical History:   Procedure Laterality Date   • CARDIAC CATHETERIZATION      Hinduism    • CATARACT EXTRACTION, BILATERAL     • HYSTERECTOMY       Social History:   Social History     Socioeconomic History   • Marital status:    Tobacco Use   • Smoking status: Former Smoker     Types: Cigarettes     Quit date: 1974     Years since quittin.0   • Smokeless tobacco: Never Used   Substance and Sexual Activity   • Alcohol use: No   • Drug use: No     Family History:   Family History   Problem Relation Age of Onset   • Heart failure Mother    • Heart disease Mother    • Heart disease Father    • Stroke Father    • Heart disease  "Sister    • Heart attack Brother    • Heart disease Sister        Objective     Vitals:    04/22/21 1349 04/22/21 1350 04/22/21 1351   BP: 156/79 144/78 144/72   BP Location: Left arm Left arm Right arm   Patient Position: Sitting Standing Sitting   Pulse: 74 75 73   SpO2: 97% 98% 96%   Weight: 75.8 kg (167 lb)     Height: 154.9 cm (61\")       Body mass index is 31.55 kg/m².    PHYSICAL EXAM:  CONSTITUTIONAL: Well nourished, cooperative, in no acute distress  HEENT: Normocephalic, atraumatic, PERRLA, no JVD  CARDIOVASCULAR:  Regular rhythm and normal rate, no murmur, gallop, rub.   RESPIRATORY: Clear to auscultation, normal respiratory effort, no wheezing, rales or ronchi  GI: Soft, nontender, normal bowel sounds  EXTREMITIES: No gross deformities, no edema. Peripheral pulses are present and equal bilaterally  SKIN: Warm, dry. No bleeding, bruising or rash  NEUROLOGICAL: No focal deficits  PSYCHIATRIC: Normal mood and affect. Behavior is normal     Labs/Diagnostic Data  Labs 3/18/21:  Lipid panel: , HDL 52, LDL 47, Trig 89  A1C 5.7%   CMP: Glucose 92, BUN 20, Cr 0.78, eGFR 73, Na 143, K 4.3, CO2 26, Alk Phos 70, AST 22, ALT 21  CBC: WBC 5.69, RBC 4.35, Hgb 13.7, Hct 39, Plt 241      ECG 12 Lead    Date/Time: 4/22/2021 4:59 PM  Performed by: Elzbieta Hankins PA-C  Authorized by: Elzbieta Hankins PA-C   Comparison: compared with previous ECG from 1/13/2020  Similar to previous ECG  Rhythm: sinus rhythm  Rate: normal  BPM: 69  Conduction: conduction normal  QRS axis: normal    Clinical impression: normal ECG  Comments: Baseline artifact        Radiology Data:   CT Chest 12/2020:  IMPRESSION:  Negative CT scan of the thorax.     Assessment and Plan:     1. Exertional chest pain: She has previously had 2 normal heart caths which were done for abnormal stress tests (see above). We will proceed with an exercise MPS study to evaluate for ST changes or perfusion abnormalities. If either is present, we " will proceed with cardiac catheterization. Otherwise, if both are negative, we may treat her empirically for microvascular spasm or disease and may try a CCB and nitrates.   2. Hypertension: slightly elevated today, continue to monitor home BPs with target <130/80mmHg.   3. Hyperlipidemia: Continue Crestor with recent LDL at target.   4. Final disposition to follow the above results.    Scribed for Angel Nassar MD by Elzbieta Hankins PA-C. 4/22/2021  17:13 EDT

## 2021-04-27 ENCOUNTER — DOCUMENTATION (OUTPATIENT)
Dept: CARDIOLOGY | Facility: CLINIC | Age: 71
End: 2021-04-27

## 2021-04-27 NOTE — PROGRESS NOTES
Her nuclear stress has been scheduled for June!!! Patient and referring MD do not wish to wait...she has scheduled study at [facility] and I will review.

## 2021-05-03 ENCOUNTER — TRANSCRIBE ORDERS (OUTPATIENT)
Dept: ADMINISTRATIVE | Facility: HOSPITAL | Age: 71
End: 2021-05-03

## 2021-05-03 DIAGNOSIS — R07.2 PRECORDIAL CHEST PAIN: Primary | ICD-10-CM

## 2021-05-04 ENCOUNTER — HOSPITAL ENCOUNTER (OUTPATIENT)
Dept: CARDIOLOGY | Facility: HOSPITAL | Age: 71
Discharge: HOME | End: 2021-05-04

## 2021-05-04 ENCOUNTER — HOSPITAL ENCOUNTER (OUTPATIENT)
Dept: NUCLEAR MEDICINE | Facility: HOSPITAL | Age: 71
Discharge: HOME | End: 2021-05-04

## 2021-05-04 DIAGNOSIS — R07.2 PRECORDIAL CHEST PAIN: ICD-10-CM

## 2021-05-04 LAB
BH CV NUCLEAR PRIOR STUDY: 3
BH CV REST NUCLEAR ISOTOPE DOSE: 9.9 MCI
BH CV STRESS BP STAGE 1: NORMAL
BH CV STRESS BP STAGE 2: NORMAL
BH CV STRESS COMMENTS STAGE 1: NORMAL
BH CV STRESS COMMENTS STAGE 2: NORMAL
BH CV STRESS DOSE REGADENOSON STAGE 1: 0.4
BH CV STRESS DURATION MIN STAGE 1: 0
BH CV STRESS DURATION MIN STAGE 2: 4
BH CV STRESS DURATION SEC STAGE 1: 10
BH CV STRESS DURATION SEC STAGE 2: 0
BH CV STRESS HR STAGE 1: 90
BH CV STRESS HR STAGE 2: 96
BH CV STRESS NUCLEAR ISOTOPE DOSE: 29.8 MCI
BH CV STRESS PROTOCOL 1: NORMAL
BH CV STRESS RECOVERY BP: NORMAL MMHG
BH CV STRESS RECOVERY HR: 80 BPM
BH CV STRESS STAGE 1: 1
BH CV STRESS STAGE 2: 2
LV EF NUC BP: 69 %
MAXIMAL PREDICTED HEART RATE: 149 BPM
PERCENT MAX PREDICTED HR: 64.43 %
STRESS BASELINE BP: NORMAL MMHG
STRESS BASELINE HR: 73 BPM
STRESS PERCENT HR: 76 %
STRESS POST PEAK BP: NORMAL MMHG
STRESS POST PEAK HR: 96 BPM
STRESS TARGET HR: 127 BPM

## 2021-05-04 PROCEDURE — A9500 TC99M SESTAMIBI: HCPCS | Performed by: INTERNAL MEDICINE

## 2021-05-04 PROCEDURE — 0 TECHNETIUM SESTAMIBI: Performed by: INTERNAL MEDICINE

## 2021-05-04 PROCEDURE — 78452 HT MUSCLE IMAGE SPECT MULT: CPT | Performed by: SPECIALIST

## 2021-05-04 PROCEDURE — 78452 HT MUSCLE IMAGE SPECT MULT: CPT

## 2021-05-04 PROCEDURE — 93018 CV STRESS TEST I&R ONLY: CPT | Performed by: SPECIALIST

## 2021-05-04 PROCEDURE — 25010000002 REGADENOSON 0.4 MG/5ML SOLUTION: Performed by: INTERNAL MEDICINE

## 2021-05-04 PROCEDURE — 93017 CV STRESS TEST TRACING ONLY: CPT

## 2021-05-04 RX ADMIN — TECHNETIUM TC 99M SESTAMIBI 1 DOSE: 1 INJECTION INTRAVENOUS at 09:05

## 2021-05-04 RX ADMIN — TECHNETIUM TC 99M SESTAMIBI 1 DOSE: 1 INJECTION INTRAVENOUS at 07:38

## 2021-05-04 RX ADMIN — REGADENOSON 0.4 MG: 0.08 INJECTION, SOLUTION INTRAVENOUS at 09:05

## 2021-05-25 ENCOUNTER — TRANSCRIBE ORDERS (OUTPATIENT)
Dept: LAB | Facility: HOSPITAL | Age: 71
End: 2021-05-25

## 2021-05-25 DIAGNOSIS — Z01.818 OTHER SPECIFIED PRE-OPERATIVE EXAMINATION: Primary | ICD-10-CM

## 2021-05-26 ENCOUNTER — TELEPHONE (OUTPATIENT)
Dept: CARDIOLOGY | Facility: CLINIC | Age: 71
End: 2021-05-26

## 2021-05-26 DIAGNOSIS — E78.2 MIXED HYPERLIPIDEMIA: ICD-10-CM

## 2021-05-26 DIAGNOSIS — I10 ESSENTIAL HYPERTENSION: ICD-10-CM

## 2021-05-26 DIAGNOSIS — E11.9 TYPE 2 DIABETES MELLITUS WITHOUT COMPLICATION, WITHOUT LONG-TERM CURRENT USE OF INSULIN (HCC): ICD-10-CM

## 2021-05-26 DIAGNOSIS — R06.09 DYSPNEA ON EXERTION: ICD-10-CM

## 2021-05-26 DIAGNOSIS — R94.39 ABNORMAL NUCLEAR STRESS TEST: ICD-10-CM

## 2021-05-26 DIAGNOSIS — R07.9 EXERTIONAL CHEST PAIN: Primary | ICD-10-CM

## 2021-05-26 NOTE — TELEPHONE ENCOUNTER
LM for pt to return call. [facility]J communicated with Dr. Ku regarding abnormal stress test done in [place].     Per [facility]J pt needs C.     Spoke with pt and she is agreeable to [facility].

## 2021-05-31 ENCOUNTER — LAB (OUTPATIENT)
Dept: LAB | Facility: HOSPITAL | Age: 71
End: 2021-05-31

## 2021-05-31 DIAGNOSIS — Z01.818 OTHER SPECIFIED PRE-OPERATIVE EXAMINATION: ICD-10-CM

## 2021-05-31 LAB — SARS-COV-2 RNA RESP QL NAA+PROBE: NOT DETECTED

## 2021-05-31 PROCEDURE — C9803 HOPD COVID-19 SPEC COLLECT: HCPCS

## 2021-05-31 PROCEDURE — U0003 INFECTIOUS AGENT DETECTION BY NUCLEIC ACID (DNA OR RNA); SEVERE ACUTE RESPIRATORY SYNDROME CORONAVIRUS 2 (SARS-COV-2) (CORONAVIRUS DISEASE [COVID-19]), AMPLIFIED PROBE TECHNIQUE, MAKING USE OF HIGH THROUGHPUT TECHNOLOGIES AS DESCRIBED BY CMS-2020-01-R: HCPCS

## 2021-06-02 PROBLEM — R07.9 EXERTIONAL CHEST PAIN: Status: ACTIVE | Noted: 2021-06-02

## 2021-06-02 PROBLEM — E11.9 TYPE 2 DIABETES MELLITUS WITHOUT COMPLICATION, WITHOUT LONG-TERM CURRENT USE OF INSULIN (HCC): Status: ACTIVE | Noted: 2021-06-02

## 2021-06-02 PROBLEM — I10 ESSENTIAL HYPERTENSION: Status: ACTIVE | Noted: 2021-06-02

## 2021-06-02 PROBLEM — R06.09 DYSPNEA ON EXERTION: Status: ACTIVE | Noted: 2021-06-02

## 2021-06-02 PROBLEM — R94.39 ABNORMAL NUCLEAR STRESS TEST: Status: ACTIVE | Noted: 2021-06-02

## 2021-06-02 PROBLEM — E78.2 MIXED HYPERLIPIDEMIA: Status: ACTIVE | Noted: 2021-06-02

## 2021-06-04 ENCOUNTER — PREP FOR SURGERY (OUTPATIENT)
Dept: OTHER | Facility: HOSPITAL | Age: 71
End: 2021-06-04

## 2021-06-04 RX ORDER — SODIUM CHLORIDE 0.9 % (FLUSH) 0.9 %
3 SYRINGE (ML) INJECTION EVERY 12 HOURS SCHEDULED
Status: CANCELLED | OUTPATIENT
Start: 2021-06-04

## 2021-06-04 RX ORDER — CLOPIDOGREL BISULFATE 75 MG/1
75 TABLET ORAL DAILY
Status: CANCELLED | OUTPATIENT
Start: 2021-06-05

## 2021-06-04 RX ORDER — NITROGLYCERIN 0.4 MG/1
0.4 TABLET SUBLINGUAL
Status: CANCELLED | OUTPATIENT
Start: 2021-06-04

## 2021-06-04 RX ORDER — ACETAMINOPHEN 325 MG/1
650 TABLET ORAL EVERY 4 HOURS PRN
Status: CANCELLED | OUTPATIENT
Start: 2021-06-04

## 2021-06-04 RX ORDER — CLOPIDOGREL BISULFATE 75 MG/1
600 TABLET ORAL ONCE
Status: CANCELLED | OUTPATIENT
Start: 2021-06-04 | End: 2021-06-04

## 2021-06-04 RX ORDER — SODIUM CHLORIDE 0.9 % (FLUSH) 0.9 %
10 SYRINGE (ML) INJECTION AS NEEDED
Status: CANCELLED | OUTPATIENT
Start: 2021-06-04

## 2021-06-07 ENCOUNTER — HOSPITAL ENCOUNTER (OUTPATIENT)
Facility: HOSPITAL | Age: 71
Discharge: HOME | End: 2021-06-07
Attending: INTERNAL MEDICINE | Admitting: INTERNAL MEDICINE

## 2021-06-07 VITALS
BODY MASS INDEX: 31.95 KG/M2 | HEIGHT: 61 IN | TEMPERATURE: 97.3 F | HEART RATE: 69 BPM | DIASTOLIC BLOOD PRESSURE: 69 MMHG | WEIGHT: 169.2 LBS | RESPIRATION RATE: 18 BRPM | SYSTOLIC BLOOD PRESSURE: 121 MMHG | OXYGEN SATURATION: 96 %

## 2021-06-07 DIAGNOSIS — R07.9 EXERTIONAL CHEST PAIN: ICD-10-CM

## 2021-06-07 DIAGNOSIS — R06.09 DYSPNEA ON EXERTION: ICD-10-CM

## 2021-06-07 DIAGNOSIS — E11.9 TYPE 2 DIABETES MELLITUS WITHOUT COMPLICATION, WITHOUT LONG-TERM CURRENT USE OF INSULIN (HCC): ICD-10-CM

## 2021-06-07 DIAGNOSIS — E78.2 MIXED HYPERLIPIDEMIA: ICD-10-CM

## 2021-06-07 DIAGNOSIS — I10 ESSENTIAL HYPERTENSION: ICD-10-CM

## 2021-06-07 DIAGNOSIS — R94.39 ABNORMAL NUCLEAR STRESS TEST: ICD-10-CM

## 2021-06-07 LAB
ALBUMIN SERPL-MCNC: 4.2 G/DL (ref 3.5–5.2)
ALBUMIN/GLOB SERPL: 1.6 G/DL
ALP SERPL-CCNC: 93 U/L (ref 39–117)
ALT SERPL W P-5'-P-CCNC: 12 U/L (ref 1–33)
ANION GAP SERPL CALCULATED.3IONS-SCNC: 9 MMOL/L (ref 5–15)
AST SERPL-CCNC: 17 U/L (ref 1–32)
BILIRUB SERPL-MCNC: 0.3 MG/DL (ref 0–1.2)
BUN SERPL-MCNC: 12 MG/DL (ref 8–23)
BUN/CREAT SERPL: 14.1 (ref 7–25)
CALCIUM SPEC-SCNC: 9.8 MG/DL (ref 8.6–10.5)
CHLORIDE SERPL-SCNC: 102 MMOL/L (ref 98–107)
CHOLEST SERPL-MCNC: 110 MG/DL (ref 0–200)
CO2 SERPL-SCNC: 27 MMOL/L (ref 22–29)
CREAT SERPL-MCNC: 0.85 MG/DL (ref 0.57–1)
DEPRECATED RDW RBC AUTO: 44.2 FL (ref 37–54)
ERYTHROCYTE [DISTWIDTH] IN BLOOD BY AUTOMATED COUNT: 12.9 % (ref 12.3–15.4)
GFR SERPL CREATININE-BSD FRML MDRD: 66 ML/MIN/1.73
GLOBULIN UR ELPH-MCNC: 2.6 GM/DL
GLUCOSE SERPL-MCNC: 98 MG/DL (ref 65–99)
HBA1C MFR BLD: 5.6 % (ref 4.8–5.6)
HCT VFR BLD AUTO: 38.4 % (ref 34–46.6)
HDLC SERPL-MCNC: 40 MG/DL (ref 40–60)
HGB BLD-MCNC: 12.8 G/DL (ref 12–15.9)
LDLC SERPL CALC-MCNC: 51 MG/DL (ref 0–100)
LDLC/HDLC SERPL: 1.25 {RATIO}
MCH RBC QN AUTO: 31.1 PG (ref 26.6–33)
MCHC RBC AUTO-ENTMCNC: 33.3 G/DL (ref 31.5–35.7)
MCV RBC AUTO: 93.2 FL (ref 79–97)
PLATELET # BLD AUTO: 228 10*3/MM3 (ref 140–450)
PMV BLD AUTO: 8.9 FL (ref 6–12)
POTASSIUM SERPL-SCNC: 3.7 MMOL/L (ref 3.5–5.2)
PROT SERPL-MCNC: 6.8 G/DL (ref 6–8.5)
RBC # BLD AUTO: 4.12 10*6/MM3 (ref 3.77–5.28)
SODIUM SERPL-SCNC: 138 MMOL/L (ref 136–145)
TRIGL SERPL-MCNC: 101 MG/DL (ref 0–150)
VLDLC SERPL-MCNC: 19 MG/DL (ref 5–40)
WBC # BLD AUTO: 6.82 10*3/MM3 (ref 3.4–10.8)

## 2021-06-07 PROCEDURE — C1894 INTRO/SHEATH, NON-LASER: HCPCS | Performed by: INTERNAL MEDICINE

## 2021-06-07 PROCEDURE — 25010000002 MIDAZOLAM PER 1 MG: Performed by: INTERNAL MEDICINE

## 2021-06-07 PROCEDURE — 63710000001 CLOPIDOGREL 75 MG TABLET: Performed by: PHYSICIAN ASSISTANT

## 2021-06-07 PROCEDURE — 83036 HEMOGLOBIN GLYCOSYLATED A1C: CPT | Performed by: PHYSICIAN ASSISTANT

## 2021-06-07 PROCEDURE — C1760 CLOSURE DEV, VASC: HCPCS | Performed by: INTERNAL MEDICINE

## 2021-06-07 PROCEDURE — 80061 LIPID PANEL: CPT | Performed by: PHYSICIAN ASSISTANT

## 2021-06-07 PROCEDURE — 25010000002 FENTANYL CITRATE (PF) 50 MCG/ML SOLUTION: Performed by: INTERNAL MEDICINE

## 2021-06-07 PROCEDURE — A9270 NON-COVERED ITEM OR SERVICE: HCPCS | Performed by: PHYSICIAN ASSISTANT

## 2021-06-07 PROCEDURE — 93458 L HRT ARTERY/VENTRICLE ANGIO: CPT | Performed by: INTERNAL MEDICINE

## 2021-06-07 PROCEDURE — 0 IOPAMIDOL PER 1 ML: Performed by: INTERNAL MEDICINE

## 2021-06-07 PROCEDURE — 80053 COMPREHEN METABOLIC PANEL: CPT | Performed by: PHYSICIAN ASSISTANT

## 2021-06-07 PROCEDURE — 85027 COMPLETE CBC AUTOMATED: CPT | Performed by: PHYSICIAN ASSISTANT

## 2021-06-07 RX ORDER — FENTANYL CITRATE 50 UG/ML
INJECTION, SOLUTION INTRAMUSCULAR; INTRAVENOUS AS NEEDED
Status: DISCONTINUED | OUTPATIENT
Start: 2021-06-07 | End: 2021-06-07 | Stop reason: HOSPADM

## 2021-06-07 RX ORDER — SODIUM CHLORIDE 9 MG/ML
3 INJECTION, SOLUTION INTRAVENOUS CONTINUOUS
Status: ACTIVE | OUTPATIENT
Start: 2021-06-07 | End: 2021-06-07

## 2021-06-07 RX ORDER — CLOPIDOGREL BISULFATE 75 MG/1
75 TABLET ORAL DAILY
Status: DISCONTINUED | OUTPATIENT
Start: 2021-06-08 | End: 2021-06-07 | Stop reason: HOSPADM

## 2021-06-07 RX ORDER — SODIUM CHLORIDE 0.9 % (FLUSH) 0.9 %
3 SYRINGE (ML) INJECTION EVERY 12 HOURS SCHEDULED
Status: DISCONTINUED | OUTPATIENT
Start: 2021-06-07 | End: 2021-06-07 | Stop reason: HOSPADM

## 2021-06-07 RX ORDER — CLOPIDOGREL BISULFATE 75 MG/1
600 TABLET ORAL ONCE
Status: COMPLETED | OUTPATIENT
Start: 2021-06-07 | End: 2021-06-07

## 2021-06-07 RX ORDER — ACETAMINOPHEN 325 MG/1
650 TABLET ORAL EVERY 4 HOURS PRN
Status: DISCONTINUED | OUTPATIENT
Start: 2021-06-07 | End: 2021-06-07 | Stop reason: HOSPADM

## 2021-06-07 RX ORDER — ASPIRIN 81 MG/1
81 TABLET ORAL DAILY
Status: DISCONTINUED | OUTPATIENT
Start: 2021-06-07 | End: 2021-06-07 | Stop reason: HOSPADM

## 2021-06-07 RX ORDER — SODIUM CHLORIDE 0.9 % (FLUSH) 0.9 %
10 SYRINGE (ML) INJECTION AS NEEDED
Status: DISCONTINUED | OUTPATIENT
Start: 2021-06-07 | End: 2021-06-07 | Stop reason: HOSPADM

## 2021-06-07 RX ORDER — NITROGLYCERIN 0.4 MG/1
0.4 TABLET SUBLINGUAL
Status: DISCONTINUED | OUTPATIENT
Start: 2021-06-07 | End: 2021-06-07 | Stop reason: HOSPADM

## 2021-06-07 RX ORDER — MIDAZOLAM HYDROCHLORIDE 1 MG/ML
INJECTION INTRAMUSCULAR; INTRAVENOUS AS NEEDED
Status: DISCONTINUED | OUTPATIENT
Start: 2021-06-07 | End: 2021-06-07 | Stop reason: HOSPADM

## 2021-06-07 RX ORDER — LIDOCAINE HYDROCHLORIDE 10 MG/ML
INJECTION, SOLUTION EPIDURAL; INFILTRATION; INTRACAUDAL; PERINEURAL AS NEEDED
Status: DISCONTINUED | OUTPATIENT
Start: 2021-06-07 | End: 2021-06-07 | Stop reason: HOSPADM

## 2021-06-07 RX ADMIN — SODIUM CHLORIDE 3 ML/KG/HR: 9 INJECTION, SOLUTION INTRAVENOUS at 10:21

## 2021-06-07 RX ADMIN — CLOPIDOGREL BISULFATE 600 MG: 75 TABLET ORAL at 10:15

## 2021-06-07 NOTE — H&P
Pre-cardiac Catheterization History and Physical  [facility] at [facility]      Patient:  Nakia Mondragon  :  1950  MRN: [id number]    PCP:  Judy Ku MD  PHONE:  [phone]    DATE: 2021  ID: Nakia Mondragon is a 71 y.o. female resident of [place], [place]     CC: Chest pain and abnormal stress MPS    PROBLEM LIST:   1. Chest pain on exertion   a. Normal coronaries and LV by cath   b. [facility] for abnormal exercise study  (ST depression with normal perfusion): Normal LV function, minor nonobstructive CAD   c. Stress MPS 2020: no ischemia, LVEF >70%  d. Echo 2020: EF 61-65%, no valvular abnormalities    e. Symptoms of exertional angina Spring 2021 with normal stress MPS in [facility] (but EKG response to exercise is abnormal).   2. Hypertension   3. Hyperlipidemia  4. DM2   5. Family history of premature CAD   6. GERD  7. Covid 19 infection 2020     BRIEF HPI: Mrs. Mondragon is a 72 y/o WF with hypertension, hyperlipidemia, diabetes and family history of premature CAD who presents for elective cardiac cath today. She was seen in our office in late April for exertional chest pressure with radiation across upper chest and bilateral shoulders. This is resolved with rest within 5 minutes. She underwent stress MPS in [place] and this was reviewed by Dr. Nassar and felt to be abnormal based on STT changes with stress. Cardiac cath was recommended and she presents in this regard.     Cardiac Risk Factors: advanced age (older than 55 for men, 65 for women), diabetes mellitus, dyslipidemia, family history of premature cardiovascular disease and hypertension    Allergies:      Allergies   Allergen Reactions   • Bactrim [Sulfamethoxazole-Trimethoprim] Rash   • Ciprofloxacin Rash       MEDICATIONS:  Current Outpatient Medications   Medication Instructions   • acetaminophen (TYLENOL) 500 mg, Oral, Every 6 Hours PRN   • aspirin 81 mg, Oral, Nightly   • coenzyme Q10  "100 mg, Oral, Daily   • cyanocobalamin 1000 MCG/ML injection 1 mL, Intramuscular, Every 30 Days   • dexlansoprazole (DEXILANT) 60 mg, Oral, Daily   • gabapentin (NEURONTIN) 300 mg, Oral, Daily   • lisinopril-hydrochlorothiazide (PRINZIDE,ZESTORETIC) 20-12.5 MG per tablet 1 tablet, Oral, Daily   • metoprolol succinate XL (TOPROL-XL) 25 mg, Oral, Nightly   • rosuvastatin (CRESTOR) 20 mg, Oral, Nightly   • Semaglutide (1 MG/DOSE) (OZEMPIC) 1 mg, Subcutaneous, Weekly, saturday        Past medical & surgical history, social and family history reviewed in the electronic medical record.    ROS: Pertinent positives listed in the HPI and problem list above. All others reviewed and negative.     Physical Exam:   Ht 154.9 cm (61\")   Wt 76.7 kg (169 lb 3.2 oz)   BMI 31.97 kg/m²     Constitutional:    Alert, cooperative, in no acute distress   Neck:     No Jugular venous distention, adenopathy, or thyromegaly noted.    Heart:    Regular rhythm and normal rate, normal S1 and S2, no murmurs,gallops, rubs, or clicks. No distinct PMI noted.    Lungs:     Clear to auscultation bilaterally, respirations regular, even and unlabored    Abdomen:     Soft nontender, nondistended, normal bowel sounds   Extremities:   No gross deformities, no edema, clubbing, or cyanosis.    Pulses:   Peripheral pulses palpable and equal bilaterally.     Labs and Diagnostic Data:      Results from last 7 days   Lab Units 06/07/21  0944   WBC 10*3/mm3 6.82   HEMOGLOBIN g/dL 12.8   HEMATOCRIT % 38.4   PLATELETS 10*3/mm3 228     Lab Results   Component Value Date    AST 23 01/13/2020    ALT 20 01/13/2020                   EKG 4/22/21: NSR    IMPRESSION:  · 70 y/o WF with hypertension, hyperlipidemia, diabetes and history of nonobstructive CAD by remote cath. She has been having recent symptoms of exertional angina with abnormal stress MPS at OSH (STT changes). Cardiac cath was recommended for further evaluation.     PLAN:  · Procedure to perform: C +/- " CBI. Risks, benefits and alternatives to the procedure explained to the patient and she understands and wishes to proceed.       Electronically signed by Elzbieta Hankins PA-C, 06/07/21, 9:54 AM EDT.

## 2021-11-11 ENCOUNTER — TRANSCRIBE ORDERS (OUTPATIENT)
Dept: ADMINISTRATIVE | Facility: HOSPITAL | Age: 71
End: 2021-11-11

## 2021-11-11 DIAGNOSIS — Z01.818 PRE-OPERATIVE CLEARANCE: Primary | ICD-10-CM

## 2022-01-28 ENCOUNTER — APPOINTMENT (OUTPATIENT)
Dept: CT IMAGING | Facility: HOSPITAL | Age: 72
End: 2022-01-28

## 2022-01-28 ENCOUNTER — HOSPITAL ENCOUNTER (INPATIENT)
Facility: HOSPITAL | Age: 72
LOS: 3 days | Discharge: HOME | End: 2022-02-01
Attending: STUDENT IN AN ORGANIZED HEALTH CARE EDUCATION/TRAINING PROGRAM | Admitting: INTERNAL MEDICINE

## 2022-01-28 DIAGNOSIS — K52.9 COLITIS: Primary | ICD-10-CM

## 2022-01-28 DIAGNOSIS — E87.20 LACTIC ACIDEMIA: ICD-10-CM

## 2022-01-28 DIAGNOSIS — A41.9 SEPSIS, DUE TO UNSPECIFIED ORGANISM, UNSPECIFIED WHETHER ACUTE ORGAN DYSFUNCTION PRESENT: ICD-10-CM

## 2022-01-28 LAB
ALBUMIN SERPL-MCNC: 4.92 G/DL (ref 3.5–5.2)
ALBUMIN/GLOB SERPL: 1.8 G/DL
ALP SERPL-CCNC: 92 U/L (ref 39–117)
ALT SERPL W P-5'-P-CCNC: 32 U/L (ref 1–33)
AMORPH URATE CRY URNS QL MICRO: ABNORMAL /HPF
ANION GAP SERPL CALCULATED.3IONS-SCNC: 14.6 MMOL/L (ref 5–15)
AST SERPL-CCNC: 31 U/L (ref 1–32)
BACTERIA UR QL AUTO: ABNORMAL /HPF
BASOPHILS # BLD AUTO: 0.02 10*3/MM3 (ref 0–0.2)
BASOPHILS NFR BLD AUTO: 0.1 % (ref 0–1.5)
BILIRUB SERPL-MCNC: 0.3 MG/DL (ref 0–1.2)
BILIRUB UR QL STRIP: ABNORMAL
BUN SERPL-MCNC: 21 MG/DL (ref 8–23)
BUN/CREAT SERPL: 21.6 (ref 7–25)
CALCIUM SPEC-SCNC: 10.1 MG/DL (ref 8.6–10.5)
CHLORIDE SERPL-SCNC: 100 MMOL/L (ref 98–107)
CLARITY UR: ABNORMAL
CO2 SERPL-SCNC: 23.4 MMOL/L (ref 22–29)
COLOR UR: ABNORMAL
CREAT SERPL-MCNC: 0.97 MG/DL (ref 0.57–1)
CRP SERPL-MCNC: 0.83 MG/DL (ref 0–0.5)
D-LACTATE SERPL-SCNC: 2.3 MMOL/L (ref 0.5–2)
DEPRECATED RDW RBC AUTO: 39.5 FL (ref 37–54)
EOSINOPHIL # BLD AUTO: 0 10*3/MM3 (ref 0–0.4)
EOSINOPHIL NFR BLD AUTO: 0 % (ref 0.3–6.2)
ERYTHROCYTE [DISTWIDTH] IN BLOOD BY AUTOMATED COUNT: 12.4 % (ref 12.3–15.4)
FLUAV SUBTYP SPEC NAA+PROBE: NOT DETECTED
FLUBV RNA ISLT QL NAA+PROBE: NOT DETECTED
GFR SERPL CREATININE-BSD FRML MDRD: 56 ML/MIN/1.73
GLOBULIN UR ELPH-MCNC: 2.8 GM/DL
GLUCOSE SERPL-MCNC: 168 MG/DL (ref 65–99)
GLUCOSE UR STRIP-MCNC: NEGATIVE MG/DL
HCT VFR BLD AUTO: 44.5 % (ref 34–46.6)
HGB BLD-MCNC: 15.4 G/DL (ref 12–15.9)
HGB UR QL STRIP.AUTO: ABNORMAL
HOLD SPECIMEN: NORMAL
HOLD SPECIMEN: NORMAL
HYALINE CASTS UR QL AUTO: ABNORMAL /LPF
IMM GRANULOCYTES # BLD AUTO: 0.05 10*3/MM3 (ref 0–0.05)
IMM GRANULOCYTES NFR BLD AUTO: 0.3 % (ref 0–0.5)
KETONES UR QL STRIP: ABNORMAL
LEUKOCYTE ESTERASE UR QL STRIP.AUTO: NEGATIVE
LIPASE SERPL-CCNC: 38 U/L (ref 13–60)
LYMPHOCYTES # BLD AUTO: 0.74 10*3/MM3 (ref 0.7–3.1)
LYMPHOCYTES NFR BLD AUTO: 4 % (ref 19.6–45.3)
MCH RBC QN AUTO: 30.2 PG (ref 26.6–33)
MCHC RBC AUTO-ENTMCNC: 34.6 G/DL (ref 31.5–35.7)
MCV RBC AUTO: 87.3 FL (ref 79–97)
MONOCYTES # BLD AUTO: 0.54 10*3/MM3 (ref 0.1–0.9)
MONOCYTES NFR BLD AUTO: 2.9 % (ref 5–12)
NEUTROPHILS NFR BLD AUTO: 17.01 10*3/MM3 (ref 1.7–7)
NEUTROPHILS NFR BLD AUTO: 92.7 % (ref 42.7–76)
NITRITE UR QL STRIP: NEGATIVE
NRBC BLD AUTO-RTO: 0 /100 WBC (ref 0–0.2)
PH UR STRIP.AUTO: 5.5 [PH] (ref 5–8)
PLATELET # BLD AUTO: 249 10*3/MM3 (ref 140–450)
PMV BLD AUTO: 9.3 FL (ref 6–12)
POTASSIUM SERPL-SCNC: 3.5 MMOL/L (ref 3.5–5.2)
PROT SERPL-MCNC: 7.7 G/DL (ref 6–8.5)
PROT UR QL STRIP: ABNORMAL
RBC # BLD AUTO: 5.1 10*6/MM3 (ref 3.77–5.28)
RBC # UR STRIP: ABNORMAL /HPF
REF LAB TEST METHOD: ABNORMAL
SARS-COV-2 RNA PNL SPEC NAA+PROBE: NOT DETECTED
SODIUM SERPL-SCNC: 138 MMOL/L (ref 136–145)
SP GR UR STRIP: 1.02 (ref 1–1.03)
SQUAMOUS #/AREA URNS HPF: ABNORMAL /HPF
UROBILINOGEN UR QL STRIP: ABNORMAL
WBC # UR STRIP: ABNORMAL /HPF
WBC NRBC COR # BLD: 18.36 10*3/MM3 (ref 3.4–10.8)
WHOLE BLOOD HOLD SPECIMEN: NORMAL
WHOLE BLOOD HOLD SPECIMEN: NORMAL

## 2022-01-28 PROCEDURE — 0097U HC BIOFIRE FILMARRAY GI PANEL: CPT | Performed by: PHYSICIAN ASSISTANT

## 2022-01-28 PROCEDURE — 87636 SARSCOV2 & INF A&B AMP PRB: CPT | Performed by: STUDENT IN AN ORGANIZED HEALTH CARE EDUCATION/TRAINING PROGRAM

## 2022-01-28 PROCEDURE — 81001 URINALYSIS AUTO W/SCOPE: CPT | Performed by: PHYSICIAN ASSISTANT

## 2022-01-28 PROCEDURE — 83605 ASSAY OF LACTIC ACID: CPT | Performed by: STUDENT IN AN ORGANIZED HEALTH CARE EDUCATION/TRAINING PROGRAM

## 2022-01-28 PROCEDURE — 99284 EMERGENCY DEPT VISIT MOD MDM: CPT

## 2022-01-28 PROCEDURE — 36415 COLL VENOUS BLD VENIPUNCTURE: CPT

## 2022-01-28 PROCEDURE — 85025 COMPLETE CBC W/AUTO DIFF WBC: CPT | Performed by: PHYSICIAN ASSISTANT

## 2022-01-28 PROCEDURE — 63710000001 ONDANSETRON ODT 4 MG TABLET DISPERSIBLE: Performed by: STUDENT IN AN ORGANIZED HEALTH CARE EDUCATION/TRAINING PROGRAM

## 2022-01-28 PROCEDURE — 80053 COMPREHEN METABOLIC PANEL: CPT | Performed by: PHYSICIAN ASSISTANT

## 2022-01-28 PROCEDURE — 25010000002 MORPHINE PER 10 MG: Performed by: STUDENT IN AN ORGANIZED HEALTH CARE EDUCATION/TRAINING PROGRAM

## 2022-01-28 PROCEDURE — 25010000002 IOPAMIDOL 61 % SOLUTION: Performed by: STUDENT IN AN ORGANIZED HEALTH CARE EDUCATION/TRAINING PROGRAM

## 2022-01-28 PROCEDURE — 87040 BLOOD CULTURE FOR BACTERIA: CPT | Performed by: STUDENT IN AN ORGANIZED HEALTH CARE EDUCATION/TRAINING PROGRAM

## 2022-01-28 PROCEDURE — 86140 C-REACTIVE PROTEIN: CPT | Performed by: PHYSICIAN ASSISTANT

## 2022-01-28 PROCEDURE — 83690 ASSAY OF LIPASE: CPT | Performed by: PHYSICIAN ASSISTANT

## 2022-01-28 PROCEDURE — 25010000002 CEFTRIAXONE PER 250 MG: Performed by: STUDENT IN AN ORGANIZED HEALTH CARE EDUCATION/TRAINING PROGRAM

## 2022-01-28 PROCEDURE — 25010000002 ONDANSETRON PER 1 MG: Performed by: STUDENT IN AN ORGANIZED HEALTH CARE EDUCATION/TRAINING PROGRAM

## 2022-01-28 PROCEDURE — 74177 CT ABD & PELVIS W/CONTRAST: CPT

## 2022-01-28 RX ORDER — ONDANSETRON 4 MG/1
4 TABLET, ORALLY DISINTEGRATING ORAL EVERY 8 HOURS PRN
Qty: 12 TABLET | Refills: 0 | Status: SHIPPED | OUTPATIENT
Start: 2022-01-28 | End: 2022-02-01 | Stop reason: HOSPADM

## 2022-01-28 RX ORDER — ONDANSETRON 2 MG/ML
4 INJECTION INTRAMUSCULAR; INTRAVENOUS ONCE
Status: COMPLETED | OUTPATIENT
Start: 2022-01-28 | End: 2022-01-28

## 2022-01-28 RX ORDER — METRONIDAZOLE 500 MG/1
500 TABLET ORAL 3 TIMES DAILY
Qty: 30 TABLET | Refills: 0 | Status: SHIPPED | OUTPATIENT
Start: 2022-01-28 | End: 2022-02-01

## 2022-01-28 RX ORDER — CEFDINIR 300 MG/1
300 CAPSULE ORAL 2 TIMES DAILY
Qty: 20 CAPSULE | Refills: 0 | Status: SHIPPED | OUTPATIENT
Start: 2022-01-28 | End: 2022-02-01

## 2022-01-28 RX ORDER — METRONIDAZOLE 250 MG/1
500 TABLET ORAL ONCE
Status: DISCONTINUED | OUTPATIENT
Start: 2022-01-29 | End: 2022-01-29

## 2022-01-28 RX ORDER — METOCLOPRAMIDE HYDROCHLORIDE 5 MG/ML
10 INJECTION INTRAMUSCULAR; INTRAVENOUS ONCE
Status: COMPLETED | OUTPATIENT
Start: 2022-01-28 | End: 2022-01-29

## 2022-01-28 RX ORDER — ONDANSETRON 4 MG/1
4 TABLET, ORALLY DISINTEGRATING ORAL EVERY 8 HOURS PRN
Status: DISCONTINUED | OUTPATIENT
Start: 2022-01-28 | End: 2022-01-30

## 2022-01-28 RX ADMIN — IOPAMIDOL 85 ML: 612 INJECTION, SOLUTION INTRAVENOUS at 20:15

## 2022-01-28 RX ADMIN — ONDANSETRON 4 MG: 2 INJECTION INTRAMUSCULAR; INTRAVENOUS at 22:35

## 2022-01-28 RX ADMIN — METOPROLOL TARTRATE 12.5 MG: 25 TABLET ORAL at 23:25

## 2022-01-28 RX ADMIN — ONDANSETRON 4 MG: 2 INJECTION INTRAMUSCULAR; INTRAVENOUS at 19:28

## 2022-01-28 RX ADMIN — SODIUM CHLORIDE 1000 ML: 9 INJECTION, SOLUTION INTRAVENOUS at 22:31

## 2022-01-28 RX ADMIN — CEFTRIAXONE 1 G: 1 INJECTION, POWDER, FOR SOLUTION INTRAMUSCULAR; INTRAVENOUS at 21:36

## 2022-01-28 RX ADMIN — MORPHINE SULFATE 4 MG: 4 INJECTION, SOLUTION INTRAMUSCULAR; INTRAVENOUS at 19:28

## 2022-01-28 RX ADMIN — METRONIDAZOLE 500 MG: 500 INJECTION, SOLUTION INTRAVENOUS at 22:26

## 2022-01-28 RX ADMIN — SODIUM CHLORIDE 1000 ML: 9 INJECTION, SOLUTION INTRAVENOUS at 19:20

## 2022-01-29 PROBLEM — K52.9 COLITIS: Status: ACTIVE | Noted: 2022-01-29

## 2022-01-29 LAB
027 TOXIN: NORMAL
ADV 40+41 DNA STL QL NAA+NON-PROBE: NOT DETECTED
ALBUMIN SERPL-MCNC: 4.27 G/DL (ref 3.5–5.2)
ALBUMIN/GLOB SERPL: 1.7 G/DL
ALP SERPL-CCNC: 68 U/L (ref 39–117)
ALT SERPL W P-5'-P-CCNC: 21 U/L (ref 1–33)
ANION GAP SERPL CALCULATED.3IONS-SCNC: 16.3 MMOL/L (ref 5–15)
AST SERPL-CCNC: 21 U/L (ref 1–32)
ASTRO TYP 1-8 RNA STL QL NAA+NON-PROBE: NOT DETECTED
BASOPHILS # BLD AUTO: 0.02 10*3/MM3 (ref 0–0.2)
BASOPHILS NFR BLD AUTO: 0.1 % (ref 0–1.5)
BILIRUB SERPL-MCNC: 0.4 MG/DL (ref 0–1.2)
BUN SERPL-MCNC: 14 MG/DL (ref 8–23)
BUN/CREAT SERPL: 18.2 (ref 7–25)
C CAYETANENSIS DNA STL QL NAA+NON-PROBE: NOT DETECTED
C COLI+JEJ+UPSA DNA STL QL NAA+NON-PROBE: NOT DETECTED
C DIFF TOX GENS STL QL NAA+PROBE: NEGATIVE
CALCIUM SPEC-SCNC: 8.9 MG/DL (ref 8.6–10.5)
CHLORIDE SERPL-SCNC: 102 MMOL/L (ref 98–107)
CO2 SERPL-SCNC: 20.7 MMOL/L (ref 22–29)
CREAT SERPL-MCNC: 0.77 MG/DL (ref 0.57–1)
CRYPTOSP DNA STL QL NAA+NON-PROBE: NOT DETECTED
D-LACTATE SERPL-SCNC: 1.1 MMOL/L (ref 0.5–2)
D-LACTATE SERPL-SCNC: 1.4 MMOL/L (ref 0.5–2)
D-LACTATE SERPL-SCNC: 1.4 MMOL/L (ref 0.5–2)
D-LACTATE SERPL-SCNC: 2.1 MMOL/L (ref 0.5–2)
D-LACTATE SERPL-SCNC: 3.1 MMOL/L (ref 0.5–2)
D-LACTATE SERPL-SCNC: 4.3 MMOL/L (ref 0.5–2)
DEPRECATED RDW RBC AUTO: 41.7 FL (ref 37–54)
E HISTOLYT DNA STL QL NAA+NON-PROBE: NOT DETECTED
EAEC PAA PLAS AGGR+AATA ST NAA+NON-PRB: NOT DETECTED
EC STX1+STX2 GENES STL QL NAA+NON-PROBE: NOT DETECTED
EOSINOPHIL # BLD AUTO: 0 10*3/MM3 (ref 0–0.4)
EOSINOPHIL NFR BLD AUTO: 0 % (ref 0.3–6.2)
EPEC EAE GENE STL QL NAA+NON-PROBE: NOT DETECTED
ERYTHROCYTE [DISTWIDTH] IN BLOOD BY AUTOMATED COUNT: 12.7 % (ref 12.3–15.4)
ETEC LTA+ST1A+ST1B TOX ST NAA+NON-PROBE: NOT DETECTED
G LAMBLIA DNA STL QL NAA+NON-PROBE: NOT DETECTED
GFR SERPL CREATININE-BSD FRML MDRD: 74 ML/MIN/1.73
GLOBULIN UR ELPH-MCNC: 2.5 GM/DL
GLUCOSE BLDC GLUCOMTR-MCNC: 129 MG/DL (ref 70–130)
GLUCOSE SERPL-MCNC: 155 MG/DL (ref 65–99)
HBA1C MFR BLD: 5.8 % (ref 4.8–5.6)
HCT VFR BLD AUTO: 36.6 % (ref 34–46.6)
HCT VFR BLD AUTO: 38.8 % (ref 34–46.6)
HGB BLD-MCNC: 12.4 G/DL (ref 12–15.9)
HGB BLD-MCNC: 13.1 G/DL (ref 12–15.9)
IMM GRANULOCYTES # BLD AUTO: 0.07 10*3/MM3 (ref 0–0.05)
IMM GRANULOCYTES NFR BLD AUTO: 0.4 % (ref 0–0.5)
LYMPHOCYTES # BLD AUTO: 0.85 10*3/MM3 (ref 0.7–3.1)
LYMPHOCYTES NFR BLD AUTO: 4.8 % (ref 19.6–45.3)
MAGNESIUM SERPL-MCNC: 1.9 MG/DL (ref 1.6–2.4)
MCH RBC QN AUTO: 30.1 PG (ref 26.6–33)
MCHC RBC AUTO-ENTMCNC: 33.8 G/DL (ref 31.5–35.7)
MCV RBC AUTO: 89.2 FL (ref 79–97)
MONOCYTES # BLD AUTO: 0.31 10*3/MM3 (ref 0.1–0.9)
MONOCYTES NFR BLD AUTO: 1.7 % (ref 5–12)
NEUTROPHILS NFR BLD AUTO: 16.52 10*3/MM3 (ref 1.7–7)
NEUTROPHILS NFR BLD AUTO: 93 % (ref 42.7–76)
NOROVIRUS GI+II RNA STL QL NAA+NON-PROBE: NOT DETECTED
NRBC BLD AUTO-RTO: 0 /100 WBC (ref 0–0.2)
P SHIGELLOIDES DNA STL QL NAA+NON-PROBE: NOT DETECTED
PLATELET # BLD AUTO: 203 10*3/MM3 (ref 140–450)
PMV BLD AUTO: 9.1 FL (ref 6–12)
POTASSIUM SERPL-SCNC: 3.1 MMOL/L (ref 3.5–5.2)
PROT SERPL-MCNC: 6.8 G/DL (ref 6–8.5)
RBC # BLD AUTO: 4.35 10*6/MM3 (ref 3.77–5.28)
RVA RNA STL QL NAA+NON-PROBE: NOT DETECTED
S ENT+BONG DNA STL QL NAA+NON-PROBE: NOT DETECTED
SAPO I+II+IV+V RNA STL QL NAA+NON-PROBE: NOT DETECTED
SHIGELLA SP+EIEC IPAH ST NAA+NON-PROBE: NOT DETECTED
SODIUM SERPL-SCNC: 139 MMOL/L (ref 136–145)
TROPONIN T SERPL-MCNC: <0.01 NG/ML (ref 0–0.03)
V CHOL+PARA+VUL DNA STL QL NAA+NON-PROBE: NOT DETECTED
V CHOLERAE DNA STL QL NAA+NON-PROBE: NOT DETECTED
WBC NRBC COR # BLD: 17.77 10*3/MM3 (ref 3.4–10.8)
Y ENTEROCOL DNA STL QL NAA+NON-PROBE: NOT DETECTED

## 2022-01-29 PROCEDURE — 93005 ELECTROCARDIOGRAM TRACING: CPT | Performed by: PHYSICIAN ASSISTANT

## 2022-01-29 PROCEDURE — 99221 1ST HOSP IP/OBS SF/LOW 40: CPT | Performed by: SURGERY

## 2022-01-29 PROCEDURE — 87493 C DIFF AMPLIFIED PROBE: CPT | Performed by: EMERGENCY MEDICINE

## 2022-01-29 PROCEDURE — 85018 HEMOGLOBIN: CPT | Performed by: PHYSICIAN ASSISTANT

## 2022-01-29 PROCEDURE — 85014 HEMATOCRIT: CPT | Performed by: PHYSICIAN ASSISTANT

## 2022-01-29 PROCEDURE — 25010000002 DEXAMETHASONE PER 1 MG: Performed by: STUDENT IN AN ORGANIZED HEALTH CARE EDUCATION/TRAINING PROGRAM

## 2022-01-29 PROCEDURE — 83735 ASSAY OF MAGNESIUM: CPT | Performed by: PHYSICIAN ASSISTANT

## 2022-01-29 PROCEDURE — 83605 ASSAY OF LACTIC ACID: CPT | Performed by: STUDENT IN AN ORGANIZED HEALTH CARE EDUCATION/TRAINING PROGRAM

## 2022-01-29 PROCEDURE — 82962 GLUCOSE BLOOD TEST: CPT

## 2022-01-29 PROCEDURE — 85025 COMPLETE CBC W/AUTO DIFF WBC: CPT | Performed by: FAMILY MEDICINE

## 2022-01-29 PROCEDURE — 80053 COMPREHEN METABOLIC PANEL: CPT | Performed by: FAMILY MEDICINE

## 2022-01-29 PROCEDURE — 25010000002 METOCLOPRAMIDE PER 10 MG: Performed by: STUDENT IN AN ORGANIZED HEALTH CARE EDUCATION/TRAINING PROGRAM

## 2022-01-29 PROCEDURE — 99223 1ST HOSP IP/OBS HIGH 75: CPT | Performed by: PHYSICIAN ASSISTANT

## 2022-01-29 PROCEDURE — 83036 HEMOGLOBIN GLYCOSYLATED A1C: CPT | Performed by: PHYSICIAN ASSISTANT

## 2022-01-29 PROCEDURE — 25010000002 MORPHINE PER 10 MG: Performed by: STUDENT IN AN ORGANIZED HEALTH CARE EDUCATION/TRAINING PROGRAM

## 2022-01-29 PROCEDURE — 84484 ASSAY OF TROPONIN QUANT: CPT | Performed by: FAMILY MEDICINE

## 2022-01-29 PROCEDURE — 25010000002 MAGNESIUM SULFATE 2 GM/50ML SOLUTION

## 2022-01-29 RX ORDER — MORPHINE SULFATE 2 MG/ML
2 INJECTION, SOLUTION INTRAMUSCULAR; INTRAVENOUS EVERY 4 HOURS PRN
Status: DISCONTINUED | OUTPATIENT
Start: 2022-01-29 | End: 2022-01-30

## 2022-01-29 RX ORDER — AMLODIPINE BESYLATE 5 MG/1
5 TABLET ORAL DAILY
COMMUNITY
End: 2022-10-18 | Stop reason: ALTCHOICE

## 2022-01-29 RX ORDER — SODIUM CHLORIDE 0.9 % (FLUSH) 0.9 %
10 SYRINGE (ML) INJECTION AS NEEDED
Status: DISCONTINUED | OUTPATIENT
Start: 2022-01-29 | End: 2022-02-01 | Stop reason: HOSPADM

## 2022-01-29 RX ORDER — SODIUM CHLORIDE 9 MG/ML
84 INJECTION, SOLUTION INTRAVENOUS CONTINUOUS
Status: DISCONTINUED | OUTPATIENT
Start: 2022-01-29 | End: 2022-01-31

## 2022-01-29 RX ORDER — ASPIRIN 81 MG/1
81 TABLET ORAL DAILY
COMMUNITY

## 2022-01-29 RX ORDER — MAGNESIUM SULFATE 1 G/100ML
1 INJECTION INTRAVENOUS AS NEEDED
Status: DISCONTINUED | OUTPATIENT
Start: 2022-01-29 | End: 2022-02-01 | Stop reason: HOSPADM

## 2022-01-29 RX ORDER — POTASSIUM CHLORIDE 20 MEQ/1
40 TABLET, EXTENDED RELEASE ORAL EVERY 4 HOURS
Status: COMPLETED | OUTPATIENT
Start: 2022-01-29 | End: 2022-01-30

## 2022-01-29 RX ORDER — DEXTROSE MONOHYDRATE 25 G/50ML
25 INJECTION, SOLUTION INTRAVENOUS
Status: DISCONTINUED | OUTPATIENT
Start: 2022-01-29 | End: 2022-02-01 | Stop reason: HOSPADM

## 2022-01-29 RX ORDER — MAGNESIUM SULFATE HEPTAHYDRATE 40 MG/ML
2 INJECTION, SOLUTION INTRAVENOUS AS NEEDED
Status: DISCONTINUED | OUTPATIENT
Start: 2022-01-29 | End: 2022-02-01 | Stop reason: HOSPADM

## 2022-01-29 RX ORDER — ASPIRIN 81 MG/1
81 TABLET ORAL DAILY
Status: CANCELLED | OUTPATIENT
Start: 2022-01-30

## 2022-01-29 RX ORDER — SODIUM CHLORIDE 0.9 % (FLUSH) 0.9 %
10 SYRINGE (ML) INJECTION EVERY 12 HOURS SCHEDULED
Status: DISCONTINUED | OUTPATIENT
Start: 2022-01-29 | End: 2022-02-01 | Stop reason: HOSPADM

## 2022-01-29 RX ORDER — MAGNESIUM SULFATE HEPTAHYDRATE 40 MG/ML
2 INJECTION, SOLUTION INTRAVENOUS ONCE
Status: COMPLETED | OUTPATIENT
Start: 2022-01-29 | End: 2022-01-29

## 2022-01-29 RX ORDER — MAGNESIUM SULFATE HEPTAHYDRATE 40 MG/ML
4 INJECTION, SOLUTION INTRAVENOUS AS NEEDED
Status: DISCONTINUED | OUTPATIENT
Start: 2022-01-29 | End: 2022-02-01 | Stop reason: HOSPADM

## 2022-01-29 RX ORDER — NICOTINE POLACRILEX 4 MG
15 LOZENGE BUCCAL
Status: DISCONTINUED | OUTPATIENT
Start: 2022-01-29 | End: 2022-02-01 | Stop reason: HOSPADM

## 2022-01-29 RX ORDER — DEXAMETHASONE SODIUM PHOSPHATE 4 MG/ML
10 INJECTION, SOLUTION INTRA-ARTICULAR; INTRALESIONAL; INTRAMUSCULAR; INTRAVENOUS; SOFT TISSUE ONCE
Status: COMPLETED | OUTPATIENT
Start: 2022-01-29 | End: 2022-01-29

## 2022-01-29 RX ORDER — LEVOCETIRIZINE DIHYDROCHLORIDE 5 MG/1
5 TABLET, FILM COATED ORAL EVERY EVENING
COMMUNITY

## 2022-01-29 RX ORDER — OMEPRAZOLE 40 MG/1
40 CAPSULE, DELAYED RELEASE ORAL DAILY
COMMUNITY

## 2022-01-29 RX ORDER — POTASSIUM CHLORIDE 20 MEQ/1
40 TABLET, EXTENDED RELEASE ORAL AS NEEDED
Status: DISCONTINUED | OUTPATIENT
Start: 2022-01-29 | End: 2022-02-01 | Stop reason: HOSPADM

## 2022-01-29 RX ORDER — POTASSIUM CHLORIDE 1.5 G/1.77G
40 POWDER, FOR SOLUTION ORAL AS NEEDED
Status: DISCONTINUED | OUTPATIENT
Start: 2022-01-29 | End: 2022-02-01 | Stop reason: HOSPADM

## 2022-01-29 RX ADMIN — SODIUM CHLORIDE 125 ML/HR: 9 INJECTION, SOLUTION INTRAVENOUS at 02:32

## 2022-01-29 RX ADMIN — MAGNESIUM SULFATE HEPTAHYDRATE 2 G: 40 INJECTION, SOLUTION INTRAVENOUS at 21:16

## 2022-01-29 RX ADMIN — METRONIDAZOLE 500 MG: 500 INJECTION, SOLUTION INTRAVENOUS at 05:53

## 2022-01-29 RX ADMIN — POTASSIUM CHLORIDE 40 MEQ: 1500 TABLET, EXTENDED RELEASE ORAL at 21:16

## 2022-01-29 RX ADMIN — SODIUM CHLORIDE 500 ML: 9 INJECTION, SOLUTION INTRAVENOUS at 20:55

## 2022-01-29 RX ADMIN — METRONIDAZOLE 500 MG: 500 INJECTION, SOLUTION INTRAVENOUS at 21:17

## 2022-01-29 RX ADMIN — MORPHINE SULFATE 4 MG: 4 INJECTION, SOLUTION INTRAMUSCULAR; INTRAVENOUS at 00:46

## 2022-01-29 RX ADMIN — SODIUM CHLORIDE, PRESERVATIVE FREE 10 ML: 5 INJECTION INTRAVENOUS at 21:32

## 2022-01-29 RX ADMIN — METRONIDAZOLE 500 MG: 500 INJECTION, SOLUTION INTRAVENOUS at 15:05

## 2022-01-29 RX ADMIN — METOCLOPRAMIDE 10 MG: 5 INJECTION, SOLUTION INTRAMUSCULAR; INTRAVENOUS at 00:46

## 2022-01-29 RX ADMIN — DEXAMETHASONE SODIUM PHOSPHATE 10 MG: 4 INJECTION, SOLUTION INTRA-ARTICULAR; INTRALESIONAL; INTRAMUSCULAR; INTRAVENOUS; SOFT TISSUE at 01:43

## 2022-01-30 ENCOUNTER — APPOINTMENT (OUTPATIENT)
Dept: CT IMAGING | Facility: HOSPITAL | Age: 72
End: 2022-01-30

## 2022-01-30 LAB
ADV 40+41 DNA STL QL NAA+NON-PROBE: NOT DETECTED
ALBUMIN SERPL-MCNC: 3.37 G/DL (ref 3.5–5.2)
ALBUMIN/GLOB SERPL: 1.5 G/DL
ALP SERPL-CCNC: 55 U/L (ref 39–117)
ALT SERPL W P-5'-P-CCNC: 18 U/L (ref 1–33)
ANION GAP SERPL CALCULATED.3IONS-SCNC: 9.8 MMOL/L (ref 5–15)
AST SERPL-CCNC: 21 U/L (ref 1–32)
ASTRO TYP 1-8 RNA STL QL NAA+NON-PROBE: NOT DETECTED
BASOPHILS # BLD AUTO: 0.02 10*3/MM3 (ref 0–0.2)
BASOPHILS NFR BLD AUTO: 0.1 % (ref 0–1.5)
BILIRUB SERPL-MCNC: 0.2 MG/DL (ref 0–1.2)
BUN SERPL-MCNC: 11 MG/DL (ref 8–23)
BUN/CREAT SERPL: 16.4 (ref 7–25)
C CAYETANENSIS DNA STL QL NAA+NON-PROBE: NOT DETECTED
C COLI+JEJ+UPSA DNA STL QL NAA+NON-PROBE: NOT DETECTED
CALCIUM SPEC-SCNC: 8.3 MG/DL (ref 8.6–10.5)
CHLORIDE SERPL-SCNC: 109 MMOL/L (ref 98–107)
CO2 SERPL-SCNC: 22.2 MMOL/L (ref 22–29)
CREAT SERPL-MCNC: 0.67 MG/DL (ref 0.57–1)
CRP SERPL-MCNC: 4.85 MG/DL (ref 0–0.5)
CRYPTOSP DNA STL QL NAA+NON-PROBE: NOT DETECTED
D-LACTATE SERPL-SCNC: 1.2 MMOL/L (ref 0.5–2)
DEPRECATED RDW RBC AUTO: 44.6 FL (ref 37–54)
E HISTOLYT DNA STL QL NAA+NON-PROBE: NOT DETECTED
EAEC PAA PLAS AGGR+AATA ST NAA+NON-PRB: NOT DETECTED
EC STX1+STX2 GENES STL QL NAA+NON-PROBE: NOT DETECTED
EOSINOPHIL # BLD AUTO: 0 10*3/MM3 (ref 0–0.4)
EOSINOPHIL NFR BLD AUTO: 0 % (ref 0.3–6.2)
EPEC EAE GENE STL QL NAA+NON-PROBE: NOT DETECTED
ERYTHROCYTE [DISTWIDTH] IN BLOOD BY AUTOMATED COUNT: 13 % (ref 12.3–15.4)
ETEC LTA+ST1A+ST1B TOX ST NAA+NON-PROBE: NOT DETECTED
G LAMBLIA DNA STL QL NAA+NON-PROBE: NOT DETECTED
GFR SERPL CREATININE-BSD FRML MDRD: 87 ML/MIN/1.73
GLOBULIN UR ELPH-MCNC: 2.2 GM/DL
GLUCOSE BLDC GLUCOMTR-MCNC: 82 MG/DL (ref 70–130)
GLUCOSE BLDC GLUCOMTR-MCNC: 87 MG/DL (ref 70–130)
GLUCOSE BLDC GLUCOMTR-MCNC: 93 MG/DL (ref 70–130)
GLUCOSE BLDC GLUCOMTR-MCNC: 93 MG/DL (ref 70–130)
GLUCOSE SERPL-MCNC: 113 MG/DL (ref 65–99)
HCT VFR BLD AUTO: 33.8 % (ref 34–46.6)
HCT VFR BLD AUTO: 33.8 % (ref 34–46.6)
HCT VFR BLD AUTO: 35.4 % (ref 34–46.6)
HCT VFR BLD AUTO: 37.1 % (ref 34–46.6)
HGB BLD-MCNC: 10.9 G/DL (ref 12–15.9)
HGB BLD-MCNC: 10.9 G/DL (ref 12–15.9)
HGB BLD-MCNC: 11.9 G/DL (ref 12–15.9)
HGB BLD-MCNC: 12.4 G/DL (ref 12–15.9)
IMM GRANULOCYTES # BLD AUTO: 0.12 10*3/MM3 (ref 0–0.05)
IMM GRANULOCYTES NFR BLD AUTO: 0.7 % (ref 0–0.5)
LYMPHOCYTES # BLD AUTO: 1.43 10*3/MM3 (ref 0.7–3.1)
LYMPHOCYTES NFR BLD AUTO: 8.5 % (ref 19.6–45.3)
MAGNESIUM SERPL-MCNC: 2.4 MG/DL (ref 1.6–2.4)
MCH RBC QN AUTO: 30.1 PG (ref 26.6–33)
MCHC RBC AUTO-ENTMCNC: 32.2 G/DL (ref 31.5–35.7)
MCV RBC AUTO: 93.4 FL (ref 79–97)
MONOCYTES # BLD AUTO: 1.13 10*3/MM3 (ref 0.1–0.9)
MONOCYTES NFR BLD AUTO: 6.7 % (ref 5–12)
NEUTROPHILS NFR BLD AUTO: 14.09 10*3/MM3 (ref 1.7–7)
NEUTROPHILS NFR BLD AUTO: 84 % (ref 42.7–76)
NOROVIRUS GI+II RNA STL QL NAA+NON-PROBE: NOT DETECTED
NRBC BLD AUTO-RTO: 0 /100 WBC (ref 0–0.2)
P SHIGELLOIDES DNA STL QL NAA+NON-PROBE: NOT DETECTED
PLATELET # BLD AUTO: 170 10*3/MM3 (ref 140–450)
PMV BLD AUTO: 9.2 FL (ref 6–12)
POTASSIUM SERPL-SCNC: 3.8 MMOL/L (ref 3.5–5.2)
POTASSIUM SERPL-SCNC: 4.3 MMOL/L (ref 3.5–5.2)
PROT SERPL-MCNC: 5.6 G/DL (ref 6–8.5)
RBC # BLD AUTO: 3.62 10*6/MM3 (ref 3.77–5.28)
RVA RNA STL QL NAA+NON-PROBE: NOT DETECTED
S ENT+BONG DNA STL QL NAA+NON-PROBE: NOT DETECTED
SAPO I+II+IV+V RNA STL QL NAA+NON-PROBE: NOT DETECTED
SHIGELLA SP+EIEC IPAH ST NAA+NON-PROBE: NOT DETECTED
SODIUM SERPL-SCNC: 141 MMOL/L (ref 136–145)
V CHOL+PARA+VUL DNA STL QL NAA+NON-PROBE: NOT DETECTED
V CHOLERAE DNA STL QL NAA+NON-PROBE: NOT DETECTED
WBC NRBC COR # BLD: 16.79 10*3/MM3 (ref 3.4–10.8)
Y ENTEROCOL DNA STL QL NAA+NON-PROBE: NOT DETECTED

## 2022-01-30 PROCEDURE — C1751 CATH, INF, PER/CENT/MIDLINE: HCPCS

## 2022-01-30 PROCEDURE — 85025 COMPLETE CBC W/AUTO DIFF WBC: CPT | Performed by: STUDENT IN AN ORGANIZED HEALTH CARE EDUCATION/TRAINING PROGRAM

## 2022-01-30 PROCEDURE — 25010000002 MORPHINE PER 10 MG: Performed by: PHYSICIAN ASSISTANT

## 2022-01-30 PROCEDURE — 86140 C-REACTIVE PROTEIN: CPT | Performed by: STUDENT IN AN ORGANIZED HEALTH CARE EDUCATION/TRAINING PROGRAM

## 2022-01-30 PROCEDURE — 83735 ASSAY OF MAGNESIUM: CPT | Performed by: STUDENT IN AN ORGANIZED HEALTH CARE EDUCATION/TRAINING PROGRAM

## 2022-01-30 PROCEDURE — 83605 ASSAY OF LACTIC ACID: CPT | Performed by: PHYSICIAN ASSISTANT

## 2022-01-30 PROCEDURE — 80053 COMPREHEN METABOLIC PANEL: CPT | Performed by: STUDENT IN AN ORGANIZED HEALTH CARE EDUCATION/TRAINING PROGRAM

## 2022-01-30 PROCEDURE — 85014 HEMATOCRIT: CPT | Performed by: PHYSICIAN ASSISTANT

## 2022-01-30 PROCEDURE — 25010000002 CEFTRIAXONE PER 250 MG: Performed by: STUDENT IN AN ORGANIZED HEALTH CARE EDUCATION/TRAINING PROGRAM

## 2022-01-30 PROCEDURE — 74174 CTA ABD&PLVS W/CONTRAST: CPT

## 2022-01-30 PROCEDURE — 0 IOPAMIDOL PER 1 ML: Performed by: INTERNAL MEDICINE

## 2022-01-30 PROCEDURE — 85018 HEMOGLOBIN: CPT | Performed by: PHYSICIAN ASSISTANT

## 2022-01-30 PROCEDURE — 99233 SBSQ HOSP IP/OBS HIGH 50: CPT | Performed by: PHYSICIAN ASSISTANT

## 2022-01-30 PROCEDURE — 36410 VNPNXR 3YR/> PHY/QHP DX/THER: CPT

## 2022-01-30 PROCEDURE — 84132 ASSAY OF SERUM POTASSIUM: CPT | Performed by: INTERNAL MEDICINE

## 2022-01-30 PROCEDURE — 25010000002 MORPHINE PER 10 MG: Performed by: INTERNAL MEDICINE

## 2022-01-30 PROCEDURE — 25010000002 PROCHLORPERAZINE 10 MG/2ML SOLUTION: Performed by: PHYSICIAN ASSISTANT

## 2022-01-30 PROCEDURE — 0097U HC BIOFIRE FILMARRAY GI PANEL: CPT | Performed by: PHYSICIAN ASSISTANT

## 2022-01-30 PROCEDURE — 82962 GLUCOSE BLOOD TEST: CPT

## 2022-01-30 PROCEDURE — 99231 SBSQ HOSP IP/OBS SF/LOW 25: CPT | Performed by: SURGERY

## 2022-01-30 PROCEDURE — 93010 ELECTROCARDIOGRAM REPORT: CPT | Performed by: INTERNAL MEDICINE

## 2022-01-30 RX ORDER — SODIUM CHLORIDE 0.9 % (FLUSH) 0.9 %
10 SYRINGE (ML) INJECTION AS NEEDED
Status: DISCONTINUED | OUTPATIENT
Start: 2022-01-30 | End: 2022-02-01 | Stop reason: HOSPADM

## 2022-01-30 RX ORDER — AMLODIPINE BESYLATE 5 MG/1
5 TABLET ORAL DAILY
Status: DISCONTINUED | OUTPATIENT
Start: 2022-01-30 | End: 2022-02-01 | Stop reason: HOSPADM

## 2022-01-30 RX ORDER — PROCHLORPERAZINE EDISYLATE 5 MG/ML
5 INJECTION INTRAMUSCULAR; INTRAVENOUS EVERY 6 HOURS PRN
Status: DISCONTINUED | OUTPATIENT
Start: 2022-01-30 | End: 2022-01-31

## 2022-01-30 RX ORDER — SODIUM CHLORIDE 0.9 % (FLUSH) 0.9 %
10 SYRINGE (ML) INJECTION EVERY 12 HOURS SCHEDULED
Status: DISCONTINUED | OUTPATIENT
Start: 2022-01-30 | End: 2022-02-01 | Stop reason: HOSPADM

## 2022-01-30 RX ORDER — CETIRIZINE HYDROCHLORIDE 10 MG/1
10 TABLET ORAL DAILY
Status: DISCONTINUED | OUTPATIENT
Start: 2022-01-30 | End: 2022-02-01 | Stop reason: HOSPADM

## 2022-01-30 RX ORDER — ROSUVASTATIN CALCIUM 20 MG/1
20 TABLET, COATED ORAL NIGHTLY
Status: DISCONTINUED | OUTPATIENT
Start: 2022-01-30 | End: 2022-02-01 | Stop reason: HOSPADM

## 2022-01-30 RX ORDER — METOPROLOL SUCCINATE 25 MG/1
25 TABLET, EXTENDED RELEASE ORAL NIGHTLY
Status: DISCONTINUED | OUTPATIENT
Start: 2022-01-30 | End: 2022-02-01 | Stop reason: HOSPADM

## 2022-01-30 RX ORDER — ONDANSETRON 2 MG/ML
4 INJECTION INTRAMUSCULAR; INTRAVENOUS EVERY 6 HOURS PRN
Status: DISCONTINUED | OUTPATIENT
Start: 2022-01-30 | End: 2022-02-01 | Stop reason: HOSPADM

## 2022-01-30 RX ORDER — MORPHINE SULFATE 2 MG/ML
2 INJECTION, SOLUTION INTRAMUSCULAR; INTRAVENOUS
Status: DISCONTINUED | OUTPATIENT
Start: 2022-01-30 | End: 2022-01-30

## 2022-01-30 RX ORDER — PANTOPRAZOLE SODIUM 40 MG/1
40 TABLET, DELAYED RELEASE ORAL EVERY MORNING
Status: DISCONTINUED | OUTPATIENT
Start: 2022-01-30 | End: 2022-02-01 | Stop reason: HOSPADM

## 2022-01-30 RX ADMIN — SODIUM CHLORIDE 125 ML/HR: 9 INJECTION, SOLUTION INTRAVENOUS at 09:33

## 2022-01-30 RX ADMIN — PROCHLORPERAZINE EDISYLATE 5 MG: 5 INJECTION INTRAMUSCULAR; INTRAVENOUS at 17:50

## 2022-01-30 RX ADMIN — METRONIDAZOLE 500 MG: 500 INJECTION, SOLUTION INTRAVENOUS at 14:25

## 2022-01-30 RX ADMIN — SODIUM CHLORIDE, PRESERVATIVE FREE 10 ML: 5 INJECTION INTRAVENOUS at 08:23

## 2022-01-30 RX ADMIN — POTASSIUM CHLORIDE 40 MEQ: 1500 TABLET, EXTENDED RELEASE ORAL at 03:58

## 2022-01-30 RX ADMIN — SODIUM CHLORIDE, PRESERVATIVE FREE 10 ML: 5 INJECTION INTRAVENOUS at 21:28

## 2022-01-30 RX ADMIN — METRONIDAZOLE 500 MG: 500 INJECTION, SOLUTION INTRAVENOUS at 21:24

## 2022-01-30 RX ADMIN — MORPHINE SULFATE 2 MG: 2 INJECTION, SOLUTION INTRAMUSCULAR; INTRAVENOUS at 14:35

## 2022-01-30 RX ADMIN — PROCHLORPERAZINE EDISYLATE 5 MG: 5 INJECTION INTRAMUSCULAR; INTRAVENOUS at 09:39

## 2022-01-30 RX ADMIN — ROSUVASTATIN CALCIUM 20 MG: 20 TABLET, FILM COATED ORAL at 21:25

## 2022-01-30 RX ADMIN — METRONIDAZOLE 500 MG: 500 INJECTION, SOLUTION INTRAVENOUS at 05:42

## 2022-01-30 RX ADMIN — MORPHINE SULFATE 2 MG: 2 INJECTION, SOLUTION INTRAMUSCULAR; INTRAVENOUS at 09:33

## 2022-01-30 RX ADMIN — IOPAMIDOL 85 ML: 755 INJECTION, SOLUTION INTRAVENOUS at 11:07

## 2022-01-30 RX ADMIN — CEFTRIAXONE 1 G: 1 INJECTION, POWDER, FOR SOLUTION INTRAMUSCULAR; INTRAVENOUS at 08:23

## 2022-01-30 RX ADMIN — SODIUM CHLORIDE 125 ML/HR: 9 INJECTION, SOLUTION INTRAVENOUS at 21:24

## 2022-01-30 RX ADMIN — SODIUM CHLORIDE 125 ML/HR: 9 INJECTION, SOLUTION INTRAVENOUS at 00:02

## 2022-01-30 RX ADMIN — SODIUM CHLORIDE, PRESERVATIVE FREE 10 ML: 5 INJECTION INTRAVENOUS at 21:27

## 2022-01-30 RX ADMIN — MORPHINE SULFATE 4 MG: 4 INJECTION, SOLUTION INTRAMUSCULAR; INTRAVENOUS at 18:21

## 2022-01-30 RX ADMIN — POTASSIUM CHLORIDE 40 MEQ: 1500 TABLET, EXTENDED RELEASE ORAL at 00:39

## 2022-01-31 LAB
ALBUMIN SERPL-MCNC: 3.14 G/DL (ref 3.5–5.2)
ALBUMIN/GLOB SERPL: 1.5 G/DL
ALP SERPL-CCNC: 53 U/L (ref 39–117)
ALT SERPL W P-5'-P-CCNC: 15 U/L (ref 1–33)
ANION GAP SERPL CALCULATED.3IONS-SCNC: 8.3 MMOL/L (ref 5–15)
AST SERPL-CCNC: 18 U/L (ref 1–32)
BASOPHILS # BLD AUTO: 0.02 10*3/MM3 (ref 0–0.2)
BASOPHILS NFR BLD AUTO: 0.2 % (ref 0–1.5)
BILIRUB SERPL-MCNC: 0.3 MG/DL (ref 0–1.2)
BUN SERPL-MCNC: 5 MG/DL (ref 8–23)
BUN/CREAT SERPL: 8.8 (ref 7–25)
CALCIUM SPEC-SCNC: 7.7 MG/DL (ref 8.6–10.5)
CHLORIDE SERPL-SCNC: 106 MMOL/L (ref 98–107)
CO2 SERPL-SCNC: 23.7 MMOL/L (ref 22–29)
CREAT SERPL-MCNC: 0.57 MG/DL (ref 0.57–1)
CRP SERPL-MCNC: 2.41 MG/DL (ref 0–0.5)
DEPRECATED RDW RBC AUTO: 43 FL (ref 37–54)
EOSINOPHIL # BLD AUTO: 0.04 10*3/MM3 (ref 0–0.4)
EOSINOPHIL NFR BLD AUTO: 0.3 % (ref 0.3–6.2)
ERYTHROCYTE [DISTWIDTH] IN BLOOD BY AUTOMATED COUNT: 13 % (ref 12.3–15.4)
GFR SERPL CREATININE-BSD FRML MDRD: 104 ML/MIN/1.73
GLOBULIN UR ELPH-MCNC: 2.1 GM/DL
GLUCOSE BLDC GLUCOMTR-MCNC: 185 MG/DL (ref 70–130)
GLUCOSE BLDC GLUCOMTR-MCNC: 77 MG/DL (ref 70–130)
GLUCOSE BLDC GLUCOMTR-MCNC: 94 MG/DL (ref 70–130)
GLUCOSE SERPL-MCNC: 104 MG/DL (ref 65–99)
HCT VFR BLD AUTO: 34.4 % (ref 34–46.6)
HCT VFR BLD AUTO: 34.4 % (ref 34–46.6)
HGB BLD-MCNC: 11.3 G/DL (ref 12–15.9)
HGB BLD-MCNC: 11.3 G/DL (ref 12–15.9)
IMM GRANULOCYTES # BLD AUTO: 0.11 10*3/MM3 (ref 0–0.05)
IMM GRANULOCYTES NFR BLD AUTO: 0.9 % (ref 0–0.5)
LYMPHOCYTES # BLD AUTO: 1.68 10*3/MM3 (ref 0.7–3.1)
LYMPHOCYTES NFR BLD AUTO: 13 % (ref 19.6–45.3)
MAGNESIUM SERPL-MCNC: 2 MG/DL (ref 1.6–2.4)
MCH RBC QN AUTO: 30 PG (ref 26.6–33)
MCHC RBC AUTO-ENTMCNC: 32.8 G/DL (ref 31.5–35.7)
MCV RBC AUTO: 91.2 FL (ref 79–97)
MONOCYTES # BLD AUTO: 0.93 10*3/MM3 (ref 0.1–0.9)
MONOCYTES NFR BLD AUTO: 7.2 % (ref 5–12)
NEUTROPHILS NFR BLD AUTO: 10.1 10*3/MM3 (ref 1.7–7)
NEUTROPHILS NFR BLD AUTO: 78.4 % (ref 42.7–76)
NRBC BLD AUTO-RTO: 0 /100 WBC (ref 0–0.2)
PLATELET # BLD AUTO: 178 10*3/MM3 (ref 140–450)
PMV BLD AUTO: 9.3 FL (ref 6–12)
POTASSIUM SERPL-SCNC: 3.3 MMOL/L (ref 3.5–5.2)
POTASSIUM SERPL-SCNC: 3.7 MMOL/L (ref 3.5–5.2)
PROT SERPL-MCNC: 5.2 G/DL (ref 6–8.5)
RBC # BLD AUTO: 3.77 10*6/MM3 (ref 3.77–5.28)
SODIUM SERPL-SCNC: 138 MMOL/L (ref 136–145)
WBC NRBC COR # BLD: 12.88 10*3/MM3 (ref 3.4–10.8)

## 2022-01-31 PROCEDURE — 83735 ASSAY OF MAGNESIUM: CPT | Performed by: STUDENT IN AN ORGANIZED HEALTH CARE EDUCATION/TRAINING PROGRAM

## 2022-01-31 PROCEDURE — 85014 HEMATOCRIT: CPT | Performed by: PHYSICIAN ASSISTANT

## 2022-01-31 PROCEDURE — 25010000002 CEFTRIAXONE PER 250 MG: Performed by: STUDENT IN AN ORGANIZED HEALTH CARE EDUCATION/TRAINING PROGRAM

## 2022-01-31 PROCEDURE — 99232 SBSQ HOSP IP/OBS MODERATE 35: CPT | Performed by: PHYSICIAN ASSISTANT

## 2022-01-31 PROCEDURE — 25010000002 MORPHINE PER 10 MG: Performed by: INTERNAL MEDICINE

## 2022-01-31 PROCEDURE — 99231 SBSQ HOSP IP/OBS SF/LOW 25: CPT | Performed by: SURGERY

## 2022-01-31 PROCEDURE — 80053 COMPREHEN METABOLIC PANEL: CPT | Performed by: STUDENT IN AN ORGANIZED HEALTH CARE EDUCATION/TRAINING PROGRAM

## 2022-01-31 PROCEDURE — 84132 ASSAY OF SERUM POTASSIUM: CPT | Performed by: INTERNAL MEDICINE

## 2022-01-31 PROCEDURE — 85018 HEMOGLOBIN: CPT | Performed by: PHYSICIAN ASSISTANT

## 2022-01-31 PROCEDURE — 86140 C-REACTIVE PROTEIN: CPT | Performed by: STUDENT IN AN ORGANIZED HEALTH CARE EDUCATION/TRAINING PROGRAM

## 2022-01-31 PROCEDURE — 82962 GLUCOSE BLOOD TEST: CPT

## 2022-01-31 PROCEDURE — 25010000002 PROCHLORPERAZINE 10 MG/2ML SOLUTION: Performed by: PHYSICIAN ASSISTANT

## 2022-01-31 PROCEDURE — 85025 COMPLETE CBC W/AUTO DIFF WBC: CPT | Performed by: STUDENT IN AN ORGANIZED HEALTH CARE EDUCATION/TRAINING PROGRAM

## 2022-01-31 RX ORDER — POTASSIUM CHLORIDE 20 MEQ/1
40 TABLET, EXTENDED RELEASE ORAL EVERY 4 HOURS
Status: COMPLETED | OUTPATIENT
Start: 2022-01-31 | End: 2022-01-31

## 2022-01-31 RX ORDER — LISINOPRIL 10 MG/1
10 TABLET ORAL
Status: DISCONTINUED | OUTPATIENT
Start: 2022-01-31 | End: 2022-02-01 | Stop reason: HOSPADM

## 2022-01-31 RX ORDER — PROCHLORPERAZINE EDISYLATE 5 MG/ML
10 INJECTION INTRAMUSCULAR; INTRAVENOUS EVERY 6 HOURS PRN
Status: DISCONTINUED | OUTPATIENT
Start: 2022-01-31 | End: 2022-02-01 | Stop reason: HOSPADM

## 2022-01-31 RX ADMIN — METRONIDAZOLE 500 MG: 500 INJECTION, SOLUTION INTRAVENOUS at 06:16

## 2022-01-31 RX ADMIN — PROCHLORPERAZINE EDISYLATE 10 MG: 5 INJECTION INTRAMUSCULAR; INTRAVENOUS at 16:25

## 2022-01-31 RX ADMIN — ROSUVASTATIN CALCIUM 20 MG: 20 TABLET, FILM COATED ORAL at 20:30

## 2022-01-31 RX ADMIN — AMLODIPINE BESYLATE 5 MG: 5 TABLET ORAL at 20:30

## 2022-01-31 RX ADMIN — PANTOPRAZOLE SODIUM 40 MG: 40 TABLET, DELAYED RELEASE ORAL at 06:16

## 2022-01-31 RX ADMIN — POTASSIUM CHLORIDE 40 MEQ: 1500 TABLET, EXTENDED RELEASE ORAL at 05:39

## 2022-01-31 RX ADMIN — METOPROLOL SUCCINATE 25 MG: 25 TABLET, EXTENDED RELEASE ORAL at 20:30

## 2022-01-31 RX ADMIN — SODIUM CHLORIDE 125 ML/HR: 9 INJECTION, SOLUTION INTRAVENOUS at 06:19

## 2022-01-31 RX ADMIN — CETIRIZINE HYDROCHLORIDE 10 MG: 10 TABLET, FILM COATED ORAL at 20:30

## 2022-01-31 RX ADMIN — LISINOPRIL 10 MG: 10 TABLET ORAL at 09:20

## 2022-01-31 RX ADMIN — CEFTRIAXONE 1 G: 1 INJECTION, POWDER, FOR SOLUTION INTRAMUSCULAR; INTRAVENOUS at 08:14

## 2022-01-31 RX ADMIN — POTASSIUM CHLORIDE 40 MEQ: 1500 TABLET, EXTENDED RELEASE ORAL at 08:14

## 2022-01-31 RX ADMIN — METRONIDAZOLE 500 MG: 500 INJECTION, SOLUTION INTRAVENOUS at 21:58

## 2022-01-31 RX ADMIN — METRONIDAZOLE 500 MG: 500 INJECTION, SOLUTION INTRAVENOUS at 13:53

## 2022-01-31 RX ADMIN — MORPHINE SULFATE 4 MG: 4 INJECTION, SOLUTION INTRAMUSCULAR; INTRAVENOUS at 01:41

## 2022-01-31 RX ADMIN — MORPHINE SULFATE 4 MG: 4 INJECTION, SOLUTION INTRAMUSCULAR; INTRAVENOUS at 20:34

## 2022-02-01 ENCOUNTER — NURSE TRIAGE (OUTPATIENT)
Dept: CALL CENTER | Facility: HOSPITAL | Age: 72
End: 2022-02-01

## 2022-02-01 ENCOUNTER — READMISSION MANAGEMENT (OUTPATIENT)
Dept: CALL CENTER | Facility: HOSPITAL | Age: 72
End: 2022-02-01

## 2022-02-01 VITALS
BODY MASS INDEX: 32.97 KG/M2 | TEMPERATURE: 98.1 F | RESPIRATION RATE: 16 BRPM | SYSTOLIC BLOOD PRESSURE: 106 MMHG | HEART RATE: 76 BPM | WEIGHT: 174.6 LBS | DIASTOLIC BLOOD PRESSURE: 56 MMHG | HEIGHT: 61 IN | OXYGEN SATURATION: 96 %

## 2022-02-01 LAB
ALBUMIN SERPL-MCNC: 2.91 G/DL (ref 3.5–5.2)
ALBUMIN/GLOB SERPL: 1.3 G/DL
ALP SERPL-CCNC: 66 U/L (ref 39–117)
ALT SERPL W P-5'-P-CCNC: 18 U/L (ref 1–33)
ANION GAP SERPL CALCULATED.3IONS-SCNC: 7.9 MMOL/L (ref 5–15)
AST SERPL-CCNC: 25 U/L (ref 1–32)
BASOPHILS # BLD AUTO: 0.04 10*3/MM3 (ref 0–0.2)
BASOPHILS NFR BLD AUTO: 0.5 % (ref 0–1.5)
BILIRUB SERPL-MCNC: 0.3 MG/DL (ref 0–1.2)
BUN SERPL-MCNC: 5 MG/DL (ref 8–23)
BUN/CREAT SERPL: 7.5 (ref 7–25)
CALCIUM SPEC-SCNC: 8.2 MG/DL (ref 8.6–10.5)
CHLORIDE SERPL-SCNC: 100 MMOL/L (ref 98–107)
CO2 SERPL-SCNC: 21.1 MMOL/L (ref 22–29)
CREAT SERPL-MCNC: 0.67 MG/DL (ref 0.57–1)
CRP SERPL-MCNC: 2.45 MG/DL (ref 0–0.5)
DEPRECATED RDW RBC AUTO: 42.5 FL (ref 37–54)
EOSINOPHIL # BLD AUTO: 0.18 10*3/MM3 (ref 0–0.4)
EOSINOPHIL NFR BLD AUTO: 2.1 % (ref 0.3–6.2)
ERYTHROCYTE [DISTWIDTH] IN BLOOD BY AUTOMATED COUNT: 13 % (ref 12.3–15.4)
GFR SERPL CREATININE-BSD FRML MDRD: 87 ML/MIN/1.73
GLOBULIN UR ELPH-MCNC: 2.3 GM/DL
GLUCOSE BLDC GLUCOMTR-MCNC: 78 MG/DL (ref 70–130)
GLUCOSE BLDC GLUCOMTR-MCNC: 95 MG/DL (ref 70–130)
GLUCOSE SERPL-MCNC: 86 MG/DL (ref 65–99)
HCT VFR BLD AUTO: 35.2 % (ref 34–46.6)
HGB BLD-MCNC: 11.6 G/DL (ref 12–15.9)
IMM GRANULOCYTES # BLD AUTO: 0.06 10*3/MM3 (ref 0–0.05)
IMM GRANULOCYTES NFR BLD AUTO: 0.7 % (ref 0–0.5)
LYMPHOCYTES # BLD AUTO: 2.51 10*3/MM3 (ref 0.7–3.1)
LYMPHOCYTES NFR BLD AUTO: 28.7 % (ref 19.6–45.3)
MAGNESIUM SERPL-MCNC: 2.1 MG/DL (ref 1.6–2.4)
MCH RBC QN AUTO: 30.1 PG (ref 26.6–33)
MCHC RBC AUTO-ENTMCNC: 33 G/DL (ref 31.5–35.7)
MCV RBC AUTO: 91.4 FL (ref 79–97)
MONOCYTES # BLD AUTO: 0.58 10*3/MM3 (ref 0.1–0.9)
MONOCYTES NFR BLD AUTO: 6.6 % (ref 5–12)
NEUTROPHILS NFR BLD AUTO: 5.37 10*3/MM3 (ref 1.7–7)
NEUTROPHILS NFR BLD AUTO: 61.4 % (ref 42.7–76)
NRBC BLD AUTO-RTO: 0.2 /100 WBC (ref 0–0.2)
PLATELET # BLD AUTO: 183 10*3/MM3 (ref 140–450)
PMV BLD AUTO: 10.2 FL (ref 6–12)
POTASSIUM SERPL-SCNC: 3.7 MMOL/L (ref 3.5–5.2)
PROT SERPL-MCNC: 5.2 G/DL (ref 6–8.5)
QT INTERVAL: 352 MS
QTC INTERVAL: 432 MS
RBC # BLD AUTO: 3.85 10*6/MM3 (ref 3.77–5.28)
SODIUM SERPL-SCNC: 129 MMOL/L (ref 136–145)
WBC NRBC COR # BLD: 8.74 10*3/MM3 (ref 3.4–10.8)

## 2022-02-01 PROCEDURE — 85025 COMPLETE CBC W/AUTO DIFF WBC: CPT | Performed by: STUDENT IN AN ORGANIZED HEALTH CARE EDUCATION/TRAINING PROGRAM

## 2022-02-01 PROCEDURE — 99239 HOSP IP/OBS DSCHRG MGMT >30: CPT | Performed by: INTERNAL MEDICINE

## 2022-02-01 PROCEDURE — 80053 COMPREHEN METABOLIC PANEL: CPT | Performed by: STUDENT IN AN ORGANIZED HEALTH CARE EDUCATION/TRAINING PROGRAM

## 2022-02-01 PROCEDURE — 86140 C-REACTIVE PROTEIN: CPT | Performed by: STUDENT IN AN ORGANIZED HEALTH CARE EDUCATION/TRAINING PROGRAM

## 2022-02-01 PROCEDURE — 25010000002 CEFTRIAXONE PER 250 MG: Performed by: STUDENT IN AN ORGANIZED HEALTH CARE EDUCATION/TRAINING PROGRAM

## 2022-02-01 PROCEDURE — 82962 GLUCOSE BLOOD TEST: CPT

## 2022-02-01 PROCEDURE — 25010000002 PROCHLORPERAZINE 10 MG/2ML SOLUTION: Performed by: PHYSICIAN ASSISTANT

## 2022-02-01 PROCEDURE — 83735 ASSAY OF MAGNESIUM: CPT | Performed by: STUDENT IN AN ORGANIZED HEALTH CARE EDUCATION/TRAINING PROGRAM

## 2022-02-01 RX ORDER — PROCHLORPERAZINE MALEATE 5 MG/1
5 TABLET ORAL EVERY 8 HOURS PRN
Qty: 20 TABLET | Refills: 0 | Status: SHIPPED | OUTPATIENT
Start: 2022-02-01

## 2022-02-01 RX ORDER — METRONIDAZOLE 500 MG/1
500 TABLET ORAL 3 TIMES DAILY
Qty: 21 TABLET | Refills: 0 | Status: SHIPPED | OUTPATIENT
Start: 2022-02-01 | End: 2022-02-08

## 2022-02-01 RX ORDER — CEFDINIR 300 MG/1
300 CAPSULE ORAL 2 TIMES DAILY
Qty: 14 CAPSULE | Refills: 0 | Status: SHIPPED | OUTPATIENT
Start: 2022-02-01 | End: 2022-02-08

## 2022-02-01 RX ORDER — AMOXICILLIN AND CLAVULANATE POTASSIUM 500; 125 MG/1; MG/1
1 TABLET, FILM COATED ORAL 3 TIMES DAILY
Status: DISCONTINUED | OUTPATIENT
Start: 2022-02-01 | End: 2022-02-01 | Stop reason: HOSPADM

## 2022-02-01 RX ORDER — DICYCLOMINE HYDROCHLORIDE 10 MG/1
10 CAPSULE ORAL 3 TIMES DAILY PRN
Qty: 21 CAPSULE | Refills: 0 | Status: SHIPPED | OUTPATIENT
Start: 2022-02-01 | End: 2022-02-08

## 2022-02-01 RX ADMIN — CEFTRIAXONE 1 G: 1 INJECTION, POWDER, FOR SOLUTION INTRAMUSCULAR; INTRAVENOUS at 08:02

## 2022-02-01 RX ADMIN — LISINOPRIL 10 MG: 10 TABLET ORAL at 08:03

## 2022-02-01 RX ADMIN — PROCHLORPERAZINE EDISYLATE 10 MG: 5 INJECTION INTRAMUSCULAR; INTRAVENOUS at 10:39

## 2022-02-01 RX ADMIN — AMOXICILLIN AND CLAVULANATE POTASSIUM 500 MG: 500; 125 TABLET, FILM COATED ORAL at 10:30

## 2022-02-01 RX ADMIN — SODIUM CHLORIDE, PRESERVATIVE FREE 10 ML: 5 INJECTION INTRAVENOUS at 08:03

## 2022-02-01 RX ADMIN — PANTOPRAZOLE SODIUM 40 MG: 40 TABLET, DELAYED RELEASE ORAL at 05:19

## 2022-02-01 RX ADMIN — SODIUM CHLORIDE, PRESERVATIVE FREE 10 ML: 5 INJECTION INTRAVENOUS at 08:04

## 2022-02-01 RX ADMIN — METRONIDAZOLE 500 MG: 500 INJECTION, SOLUTION INTRAVENOUS at 05:20

## 2022-02-01 NOTE — PLAN OF CARE
Goal Outcome Evaluation:  Plan of Care Reviewed With: patient        Progress: improving     Pt states she is still having watery diarrhea. Pain medicine give x1 for abdominal pain. No acute changes.

## 2022-02-01 NOTE — TELEPHONE ENCOUNTER
"    Reason for Disposition  • Rash beginning within 4 hours of a new prescription medication    Additional Information  • Negative: Difficulty breathing or wheezing  • Negative: Hoarseness or cough that started soon after 1st dose of drug  • Negative: Swollen tongue that started soon after first dose of drug  • Negative: Fever and purple or blood-colored spots or dots  • Negative: Too weak or sick to stand  • Negative: Sounds like a life-threatening emergency to the triager  • Negative: Rash is only on 1 part of the body (localized)  • Negative: Taking new non-prescription (OTC) antihistamine, decongestant, ear drops, eye drops, or other OTC cough/cold medicine  • Negative: Taking new prescription antihistamine, allergy medicine, asthma medicine, eye drops, ear drops or nose drops  • Negative: Rash started more than 3 days after stopping new prescription medicine  • Negative: Swollen tongue  • Negative: Widespread hives and onset < 2 hours of exposure to 1st dose of drug  • Negative: Patient sounds very sick or weak to the triager  • Negative: Fever  • Negative: Face or lip swelling  • Negative: Purple or blood-colored spots or dots (no fever and sounds well to triager)  • Negative: Joint pain or swelling  • Negative: Bloody crusts on lips or in mouth  • Negative: Large or small blisters on skin (i.e., fluid filled bubbles or sacs)  • Negative: Pregnant    Answer Assessment - Initial Assessment Questions  1. APPEARANCE of RASH: \"Describe the rash.\" (e.g., spots, blisters, raised areas, skin peeling, scaly)      Red tingling feeling across chest and shoulders  2. SIZE: \"How big are the spots?\" (e.g., tip of pen, eraser, coin; inches, centimeters)      unknown  3. LOCATION: \"Where is the rash located?\"      Chest and shoulders  4. COLOR: \"What color is the rash?\" (Note: It is difficult to assess rash color in people with darker-colored skin. When this situation occurs, simply ask the caller to describe what they " "see.)      red  5. ONSET: \"When did the rash begin?\"      After discharge today  6. FEVER: \"Do you have a fever?\" If so, ask: \"What is your temperature, how was it measured, and when did it start?\"      denies  7. ITCHING: \"Does the rash itch?\" If so, ask: \"How bad is the itch?\" (Scale 1-10; or mild, moderate, severe)      denies  8. CAUSE: \"What do you think is causing the rash?\"      Flagyl or cefdinir  9. NEW MEDICATION: \"What new medication are you taking?\" (e.g., name of antibiotic) \"When did you start taking this medication?\".      Cefdinir started today; has been on IV flagyl for several days, started on oral flagyl today  10. OTHER SYMPTOMS: \"Do you have any other symptoms?\" (e.g., sore throat, fever, joint pain)        denies  11. PREGNANCY: \"Is there any chance you are pregnant?\" \"When was your last menstrual period?\"        na    Protocols used: RASH - WIDESPREAD ON DRUGS - DRUG REACTION-ADULT-[place]      "

## 2022-02-01 NOTE — CASE MANAGEMENT/SOCIAL WORK
Continued Stay Note  [facility]     Patient Name: Nakia Mondragon  MRN: [id number]  Today's Date: 2/1/2022    Admit Date: 1/28/2022     Discharge Plan     Row Name 02/01/22 1057       Plan    Final Discharge Disposition Code 01 - home or self-care    Final Note Pt is being d/c home today with PO Rx and to f/u with PCP.  CM spoke with pt's daughter and encouraged them to use the 24/7 nurse call center for any questions/concerns they may have after discharge.  Daughter will provide transportation home.  They deny any further needs.           Kareen Reyes RN

## 2022-02-01 NOTE — PROGRESS NOTES
[facility] HOSPITALIST PROGRESS NOTE     Patient Identification:  Name:  Nakia Mondragon  Age:  71 y.o.  Sex:  female  :  1950  MRN:  [id number]  Visit Number:  [id number]  ROOM: [address] [place] [address]     Primary Care Provider:  Judy Ku MD     Date of Admission: 2022    Length of stay in inpatient status:  3    Subjective     Chief Compliant:    Chief Complaint   Patient presents with   • Abdominal Pain     History of Presenting Illness:    Pt is lying in bed on my assessment, reports intermittent crampy abdominal pain -> worse w/ sitting up and better when lying flat; denies any nausea.  Pt notes is tolerating po intake    ROS: no f/c, no n/v    Objective     Current Hospital Meds:amLODIPine, 5 mg, Oral, Daily  cefTRIAXone, 1 g, Intravenous, Daily  cetirizine, 10 mg, Oral, Daily  insulin aspart, 0-7 Units, Subcutaneous, TID AC  lisinopril, 10 mg, Oral, Q24H  metoprolol succinate XL, 25 mg, Oral, Nightly  metroNIDAZOLE, 500 mg, Intravenous, Q8H  pantoprazole, 40 mg, Oral, QAM  rosuvastatin, 20 mg, Oral, Nightly  sodium chloride, 10 mL, Intravenous, Q12H  sodium chloride, 10 mL, Intravenous, Q12H       Current Antimicrobial Therapy:  Anti-Infectives (From admission, onward)    Ordered     Dose/Rate Route Frequency Start Stop    22  metroNIDAZOLE (FLAGYL) 500 mg/100mL IVPB        Ordering Provider: Beau Echeverria MD    500 mg  over 30 Minutes Intravenous Every 8 Hours 22  cefTRIAXone (ROCEPHIN) 1 g in sodium chloride 0.9 % 100 mL IVPB-VTB        Ordering Provider: Beau Echeverria MD    1 g  200 mL/hr over 30 Minutes Intravenous Daily 22 0859    22  cefdinir (OMNICEF) 300 MG capsule        Ordering Provider: Beau Echeverria MD    300 mg Oral 2 Times Daily 22 0000 229    22  metroNIDAZOLE (FLAGYL) 500 MG tablet        Ordering Provider: Beau Echeverria MD    500 mg Oral 3  Times Daily 01/28/22 0000 02/07/22 2815        Current Diuretic Therapy:  Diuretics (From admission, onward)    None        ----------------------------------------------------------------------------------------------------------------------  Vital Signs:  Temp:  [98 °F (36.7 °C)-98.6 °F (37 °C)] 98.6 °F (37 °C)  Heart Rate:  [73-94] 75  Resp:  [18-20] 18  BP: (114-149)/(52-80) 140/62  SpO2:  [94 %-97 %] 96 %  on   ;   Device (Oxygen Therapy): room air  Body mass index is 32.99 kg/m².    Wt Readings from Last 3 Encounters:   02/01/22 79.2 kg (174 lb 9.6 oz)   06/07/21 76.7 kg (169 lb 3.2 oz)   04/22/21 75.8 kg (167 lb)     Intake & Output (last 3 days)       01/29 0701 01/30 0700 01/30 0701 01/31 0700 01/31 0701 02/01 0700 02/01 0701  02/02 0700    P.O. 240 900 440     I.V. (mL/kg)   1200 (15.2)     IV Piggyback 200       Total Intake(mL/kg) 440 (5.6) 900 (11.4) 1640 (20.7)     Net +440 +900 +1640             Urine Unmeasured Occurrence 2 x 7 x 4 x     Stool Unmeasured Occurrence  1 x            Physical Exam   Gen: wdwn wf in nad  EyeS: anicteric sclera  Ent: mmm  Neck: supple, no lad  Pulm: cta bilat, non-labored  Cv: rrr, no edema ble  Abd: + bs, soft, obese, mild diffuse ttp, no rebound/guarding  Psych: alert, fluent speech     LABS:    CBC and coagulation:  Results from last 7 days   Lab Units 02/01/22  0514 01/31/22  0258 01/30/22  1908 01/30/22  0939 01/30/22  0305 01/29/22  1931 01/29/22  1930 01/29/22  1738 01/29/22  1223 01/29/22  0836 01/29/22  0402 01/28/22  2349 01/28/22  2127 01/28/22  1553 01/28/22  1553   LACTATE mmol/L  --   --   --   --  1.2  --  4.3* 2.1* 1.4 1.4 1.1 3.1*   < >  --   --    CRP mg/dL 2.45* 2.41*  --   --  4.85*  --   --   --   --   --   --   --   --   --  0.83*   WBC 10*3/mm3 8.74 12.88*  --   --  16.79*  --   --   --  17.77*  --   --   --   --   --  18.36*   HEMOGLOBIN g/dL 11.6* 11.3*  11.3* 11.9* 12.4 10.9*  10.9* 12.4  --   --  13.1  --   --   --   --    < > 15.4    HEMATOCRIT % 35.2 34.4  34.4 35.4 37.1 33.8*  33.8* 36.6  --   --  38.8  --   --   --   --    < > 44.5   MCV fL 91.4 91.2  --   --  93.4  --   --   --  89.2  --   --   --   --   --  87.3   MCHC g/dL 33.0 32.8  --   --  32.2  --   --   --  33.8  --   --   --   --   --  34.6   PLATELETS 10*3/mm3 183 178  --   --  170  --   --   --  203  --   --   --   --   --  249    < > = values in this interval not displayed.     Acid/base balance:      Renal and electrolytes:  Results from last 7 days   Lab Units 02/01/22 0514 01/31/22  1307 01/31/22  0258 01/30/22  0939 01/30/22  0305 01/29/22  1223 01/28/22  1553   SODIUM mmol/L 129*  --  138  --  141 139 138   POTASSIUM mmol/L 3.7 3.7 3.3* 4.3 3.8 3.1* 3.5   MAGNESIUM mg/dL 2.1  --  2.0  --  2.4 1.9  --    CHLORIDE mmol/L 100  --  106  --  109* 102 100   CO2 mmol/L 21.1*  --  23.7  --  22.2 20.7* 23.4   BUN mg/dL 5*  --  5*  --  11 14 21   CREATININE mg/dL 0.67  --  0.57  --  0.67 0.77 0.97   EGFR IF NONAFRICN AM mL/min/1.73 87  --  104  --  87 74 56*   CALCIUM mg/dL 8.2*  --  7.7*  --  8.3* 8.9 10.1   GLUCOSE mg/dL 86  --  104*  --  113* 155* 168*     Estimated Creatinine Clearance: 60.6 mL/min (by C-G formula based on SCr of 0.67 mg/dL).    Liver and pancreatic function:  Results from last 7 days   Lab Units 02/01/22  0514 01/31/22  0258 01/30/22  0305 01/29/22  1223 01/28/22  1553   ALBUMIN g/dL 2.91* 3.14* 3.37* 4.27 4.92   BILIRUBIN mg/dL 0.3 0.3 0.2 0.4 0.3   ALK PHOS U/L 66 53 55 68 92   AST (SGOT) U/L 25 18 21 21 31   ALT (SGPT) U/L 18 15 18 21 32   LIPASE U/L  --   --   --   --  38     Endocrine function:  Lab Results   Component Value Date    HGBA1C 5.80 (H) 01/29/2022     Point of care bedside glucose levels:  Results from last 7 days   Lab Units 02/01/22  0639 01/31/22  1617 01/31/22  1013 01/31/22  0725 01/30/22  2130 01/30/22  1645 01/30/22  1024 01/30/22  0712 01/29/22  2030   GLUCOSE mg/dL 78 77 185* 94 82 87 93 93 129     Glucose levels from the  CMP:  Results from last 7 days   Lab Units 02/01/22  0514 01/31/22  0258 01/30/22  0305 01/29/22  1223 01/28/22  1553   GLUCOSE mg/dL 86 104* 113* 155* 168*     Lab Results   Component Value Date    TSH 1.960 01/13/2020     Cardiac:  Results from last 7 days   Lab Units 01/29/22  1223   TROPONIN T ng/mL <0.010       Cultures:  Lab Results   Component Value Date    COLORU Dark Yellow (A) 01/28/2022    CLARITYU Turbid (A) 01/28/2022    PHUR 5.5 01/28/2022    GLUCOSEU Negative 01/28/2022    KETONESU 15 mg/dL (1+) (A) 01/28/2022    BLOODU Moderate (2+) (A) 01/28/2022    NITRITEU Negative 01/28/2022    LEUKOCYTESUR Negative 01/28/2022    BILIRUBINUR Small (1+) (A) 01/28/2022    UROBILINOGEN 1.0 E.U./dL 01/28/2022    RBCUA 3-5 (A) 01/28/2022    WBCUA 3-5 (A) 01/28/2022    BACTERIA Trace (A) 01/28/2022     Microbiology Results (last 10 days)     Procedure Component Value - Date/Time    Gastrointestinal Panel, PCR - Stool, Per Rectum [[id number]]  (Normal) Collected: 01/30/22 1344    Lab Status: Final result Specimen: Stool from Per Rectum Updated: 01/30/22 1529     Campylobacter Not Detected     Plesiomonas shigelloides Not Detected     Salmonella Not Detected     Vibrio Not Detected     Vibrio cholerae Not Detected     Yersinia enterocolitica Not Detected     Enteroaggregative E. coli (EAEC) Not Detected     Enteropathogenic E. coli (EPEC) Not Detected     Enterotoxigenic E. coli (ETEC) lt/st Not Detected     Shiga-like toxin-producing E. coli (STEC) stx1/stx2 Not Detected     Shigella/Enteroinvasive E. coli (EIEC) Not Detected     Cryptosporidium Not Detected     Cyclospora cayetanensis Not Detected     Entamoeba histolytica Not Detected     Giardia lamblia Not Detected     Adenovirus F40/41 Not Detected     Astrovirus Not Detected     Norovirus GI/GII Not Detected     Rotavirus A Not Detected     Sapovirus (I, II, IV or V) Not Detected    Clostridium Difficile Toxin - Stool, Per Rectum [[id number]] Collected: 01/29/22  1300    Lab Status: Final result Specimen: Stool from Per Rectum Updated: 01/29/22 1354    Narrative:      The following orders were created for panel order Clostridium Difficile Toxin - Stool, Per Rectum.  Procedure                               Abnormality         Status                     ---------                               -----------         ------                     Clostridium Difficile To...[[id number]]                      Final result                 Please view results for these tests on the individual orders.    Clostridium Difficile Toxin, PCR - Stool, Per Rectum [[id number]] Collected: 01/29/22 1300    Lab Status: Final result Specimen: Stool from Per Rectum Updated: 01/29/22 1354     C. Difficile Toxins by PCR Negative     027 Toxin Presumptive Negative    Narrative:      For In Vitro Diagnostic use only.  027-NAP1-BI results are NOT intended to guide treatment. 027 typing is relative to PCR ribotyping and shown to be equivalent to B1 typing by restriction endonuclease analysis or NAP1 typing by pulse field gel electrophoresis.    Gastrointestinal Panel, PCR - Stool, Per Rectum [[id number]]  (Normal) Collected: 01/28/22 2346    Lab Status: Final result Specimen: Stool from Per Rectum Updated: 01/29/22 0128     Campylobacter Not Detected     Plesiomonas shigelloides Not Detected     Salmonella Not Detected     Vibrio Not Detected     Vibrio cholerae Not Detected     Yersinia enterocolitica Not Detected     Enteroaggregative E. coli (EAEC) Not Detected     Enteropathogenic E. coli (EPEC) Not Detected     Enterotoxigenic E. coli (ETEC) lt/st Not Detected     Shiga-like toxin-producing E. coli (STEC) stx1/stx2 Not Detected     Shigella/Enteroinvasive E. coli (EIEC) Not Detected     Cryptosporidium Not Detected     Cyclospora cayetanensis Not Detected     Entamoeba histolytica Not Detected     Giardia lamblia Not Detected     Adenovirus F40/41 Not Detected     Astrovirus Not Detected     Norovirus  GI/GII Not Detected     Rotavirus A Not Detected     Sapovirus (I, II, IV or V) Not Detected    COVID-19 and FLU A/B PCR - Swab, Nasopharynx [[id number]]  (Normal) Collected: 01/28/22 2234    Lab Status: Final result Specimen: Swab from Nasopharynx Updated: 01/28/22 2259     COVID19 Not Detected     Influenza A PCR Not Detected     Influenza B PCR Not Detected    Narrative:      Fact sheet for providers: https://www.fda.gov/media/196194/download    Fact sheet for patients: https://www.fda.gov/media/394935/download    Test performed by PCR.    Blood Culture - Blood, Arm, Right [[id number]]  (Normal) Collected: 01/28/22 2127    Lab Status: Preliminary result Specimen: Blood from Arm, Right Updated: 01/31/22 2145     Blood Culture No growth at 3 days    Blood Culture - Blood, Arm, Left [[id number]]  (Normal) Collected: 01/28/22 2120    Lab Status: Preliminary result Specimen: Blood from Arm, Left Updated: 01/31/22 2145     Blood Culture No growth at 3 days          No results found for: PREGTESTUR, PREGSERUM, HCG, HCGQUANT  Pain Management Panel    There is no flowsheet data to display.         I have personally looked at the labs and they are summarized above.  ----------------------------------------------------------------------------------------------------------------------  Detailed radiology reports for the last 24 hours:    Imaging Results (Last 24 Hours)     ** No results found for the last 24 hours. **          Assessment & Plan      1. Severe sepsis, POA, (HR >90, WBC count >18,000, lactic acid 3.1) 2/2 extensive circumferential pancolitis  · Blood cultures (1/28) NGTD  · CT a/p on admit w/ pancolitis   · c diff toxin (1/29) neg x1  · c diff pcr (1/29) neg x2   · GI pcr (1/30) neg  · CTA a/p w/o significant vascular pathology  · gen surgery following    · Will d/c w/ po cephalosporin/flagyl      2. Acute hypokalemia  · Likely secondary to GI losses, s/p repletion     3. AG acidosis  · Likely secondary to lactic  acidosis as well as suspected GI bicarb losses.  · Corrected w/ treatment of colitis     4. DM II  · Glucose last 24 hours .  · Continue SSI with FSBG AC and HS.  · HA1c level 5.5%.      5. HTN  · Home amlodipine and metoprolol succinate resumed.  · Home lisinopril/HCTZ held on admission given dehydration.  · Resume home meds on d/c     6. PPx  · SCDS.      Disposition: home today    Suhail Barfield MD  [facility] Hospitalist  02/01/22  08:02 EST

## 2022-02-02 ENCOUNTER — NURSE TRIAGE (OUTPATIENT)
Dept: CALL CENTER | Facility: HOSPITAL | Age: 72
End: 2022-02-02

## 2022-02-02 LAB
BACTERIA SPEC AEROBE CULT: NORMAL
BACTERIA SPEC AEROBE CULT: NORMAL

## 2022-02-02 NOTE — TELEPHONE ENCOUNTER
Reason for Disposition  • [1] Caller has URGENT medicine question about med that PCP or specialist prescribed AND [2] triager unable to answer question    Additional Information  • Negative: [1] Intentional drug overdose AND [2] suicidal thoughts or ideas  • Negative: Drug overdose and triager unable to answer question  • Negative: Caller requesting information unrelated to medicine  • Negative: Caller requesting information about COVID-19 Vaccine  • Negative: Caller requesting information about Emergency Contraception  • Negative: Caller requesting information about Combined Birth Control Pills  • Negative: Caller requesting information about Progestin Birth Control Pills  • Negative: Caller requesting information about Post-Op pain or medicines  • Negative: Caller requesting a prescription antibiotic (such as Penicillin) for Strep throat and has a positive culture result  • Negative: Caller requesting a prescription anti-viral med (such as Tamiflu) and has influenza (flu)  symptoms  • Negative: Immunization reaction suspected  • Negative: Rash while taking a medicine or within 3 days of stopping it  • Negative: [1] Asthma and [2] having symptoms of asthma (cough, wheezing, etc.)  • Negative: [1] Symptom of illness (e.g., headache, abdominal pain, earache, vomiting) AND [2] more than mild  • Negative: Breastfeeding questions about mother's medicines and diet  • Negative: MORE THAN A DOUBLE DOSE of a prescription or over-the-counter (OTC) drug  • Negative: [1] DOUBLE DOSE (an extra dose or lesser amount) of prescription drug AND [2] any symptoms (e.g., dizziness, nausea, pain, sleepiness)  • Negative: [1] DOUBLE DOSE (an extra dose or lesser amount) of over-the-counter (OTC) drug AND [2] any symptoms (e.g., dizziness, nausea, pain, sleepiness)  • Negative: Took another person's prescription drug  • Negative: [1] DOUBLE DOSE (an extra dose or lesser amount) of prescription drug AND [2] NO symptoms (Exception: a  "double dose of antibiotics)  • Negative: Diabetes drug error or overdose (e.g., took wrong type of insulin or took extra dose)  • Negative: [1] Prescription refill request for ESSENTIAL medicine (i.e., likelihood of harm to patient if not taken) AND [2] triager unable to refill per department policy  • Negative: [1] Prescription not at pharmacy AND [2] was prescribed by PCP recently (Exception: triager has access to EMR and prescription is recorded there. Go to Home Care and confirm for pharmacy.)  • Negative: [1] Pharmacy calling with prescription question AND [2] triager unable to answer question    Answer Assessment - Initial Assessment Questions  1. NAME of MEDICATION: \"What medicine are you calling about?\"      Flagyl and cefdinir  2. QUESTION: \"What is your question?\" (e.g., medication refill, side effect)      Should I continue to take these medications?  When taken yesterday, a burning/tingling sensation occurred across my upper chest and not sure which one caused the sensation.  3. PRESCRIBING HCP: \"Who prescribed it?\" Reason: if prescribed by specialist, call should be referred to that group.      Hospitalist  4. SYMPTOMS: \"Do you have any symptoms?\"      None today; yesterday burning/tingling  5. SEVERITY: If symptoms are present, ask \"Are they mild, moderate or severe?\"      None today; mild yesterday  6. PREGNANCY:  \"Is there any chance that you are pregnant?\" \"When was your last menstrual period?\"      na    Protocols used: MEDICATION QUESTION CALL-ADULT-      "

## 2022-02-02 NOTE — PAYOR COMM NOTE
"CONTACT:   tSephanie Mosqueda RN  Phone: [phone]  Fax: [phone]    DISCHARGE NOTIFICATION- STILL PENDING APPROVAL     DISCHARGE TO: HOME    REF # [id number]  DC DATE: 2/1/22    IF YOU NEED ANYTHING FURTHER PLEASE LET ME KNOW.   THANKS     Ti Mondargon (71 y.o. Female)             Date of Birth Social Security Number Address Home Phone MRN    1950  [address]  [facility] [address] [phone] [id number]    Oriental orthodox Marital Status             Hindu        Admission Date Admission Type Admitting Provider Attending Provider Department, Room/Bed    1/28/22 Emergency Misael Bowling, [address], [address]/    Discharge Date Discharge Disposition Discharge Destination          2/1/2022 Home or Self Care              Attending Provider: (none)   Allergies: Bactrim [Sulfamethoxazole-trimethoprim], Ciprofloxacin    Isolation: None   Infection: None   Code Status: Prior   Advance Care Planning Activity    Ht: 154.9 cm (61\")   Wt: 79.2 kg (174 lb 9.6 oz)    Admission Cmt: None   Principal Problem: Colitis [K52.9]                 Active Insurance as of 1/28/2022     Primary Coverage     Payor Plan Insurance Group Employer/Plan Group    HUMANA MEDICARE REPLACEMENT HUMANA MEDICARE REPLACEMENT [id number]     Payor Plan Address Payor Plan Phone Number Payor Plan Fax Number Effective Dates    [address] [address] [phone]  8/1/2014 - None Entered    [facility] [id number]       Subscriber Name Subscriber Birth Date Member ID       TI MONDRAGON 1950 [id number]                 Emergency Contacts      (Rel.) Home Phone Work Phone Mobile Phone    Rosemary,Jolynn (Daughter) -- -- [phone]    ALMA MONDRAGON (Son) -- -- [phone]            Discharge Summary    No notes of this type exist for this encounter.         "

## 2022-02-02 NOTE — OUTREACH NOTE
Prep Survey      Responses   [facility] discharged from? [facility]   Is LACE score < 7 ? No   Emergency Room discharge w/ pulse ox? No   Eligibility Readm Mgmt   Discharge diagnosis severe sepsis, hypokalemia, pancolitis   Does the patient have one of the following disease processes/diagnoses(primary or secondary)? Sepsis   Does the patient have Home health ordered? No   Is there a DME ordered? No   Prep survey completed? Yes          Giselle Rico RN

## 2022-02-03 ENCOUNTER — READMISSION MANAGEMENT (OUTPATIENT)
Dept: CALL CENTER | Facility: HOSPITAL | Age: 72
End: 2022-02-03

## 2022-02-03 NOTE — OUTREACH NOTE
Sepsis Week 1 Survey      Responses   [facility] [place] patient discharged from? [facility]   Does the patient have one of the following disease processes/diagnoses(primary or secondary)? Sepsis   Week 1 attempt successful? Yes   Call start time 1354   Call end time 1356   Discharge diagnosis severe sepsis, hypokalemia, pancolitis   Meds reviewed with patient/caregiver? Yes   Is the patient having any side effects they believe may be caused by any medication additions or changes? No   Does the patient have all medications related to this admission filled (includes all antibiotics, inhalers, nebulizers,steroids,etc.) Yes   Is the patient taking all medications as directed (includes completed medication regime)? Yes   Does the patient have a primary care provider?  Yes   Comments regarding PCP 2/8/22   Does the patient have an appointment with their PCP within 7 days of discharge? Yes   Has the patient kept scheduled appointments due by today? N/A   Psychosocial issues? No   Nursing interventions Reviewed instructions with patient   What is the patient's perception of their health status since discharge? Improving   Nursing interventions Nurse provided patient education   Is the patient/caregiver able to teach back Sepsis? S - Shivering,fever or very cold,  S - Sleepy, difficult to arouse,confused,  S - Short of breath   Nursing interventions Nurse provided patient education   Is patient/caregiver able to teach back steps to recovery at home? Set small, achievable goals for return to baseline health,  Rest and regain strength   Is the patient/caregiver able to teach back signs and symptoms of worsening condition: Fever,  Hyperthermia,  Shortness of breath/rapid respiratory rate,  Altered mental status(confusion/coma)   If the patient is a current smoker, are they able to teach back resources for cessation? Not a smoker   Is the patient/caregiver able to teach back the hierarchy of who to call/visit for symptoms/problems?  PCP, Specialist, Home health nurse, Urgent Care, ED, 911 Yes   Week 1 call completed? Yes          Gwendolyn Badillo RN

## 2022-02-03 NOTE — DISCHARGE SUMMARY
[facility] DISCHARGE SUMMARY     Patient Identification:  Name:  Nakia Mondragon  Age:  71 y.o.  Sex:  female  :  1950  MRN:  [id number]  Visit Number:  [id number]     Date of Admission: 2022  Date of Discharge: 2022     PCP: Judy Ku MD     DISCHARGE DIAGNOSES  1. Severe sepsis (POA) w/ colitis as etiology  2. Hypokalemia, resolved w/ repletion  3. DM II  4. HTN     CONSULTS    general surgery     HPI  Please see admit H & P for details       HOSPITAL COURSE  1. Severe sepsis, POA  - Pt presented w/ 2+ SIRS criteria w/ evidence of end-organ hypoperfusion (lactic acidosis).   Pt was admitted and lactic acidosis resolved w/ resuscitation and parenteral abx.   Pt had micro w/u including:  Blood cultures () NGTD, c diff toxin () neg x1, c diff pcr () neg x2, GI pcr () neg. Pt had ct a/p w/ pancolitis ->  Subsequent CTA a/p w/o significant vascular pathology to suggest that colitis was infectious in nature rather than ischemic.  Gen surgery saw pt in consultation.  Pt's abdominal pain, nausea, and diarrhea improved and on day of d/c she was tolerating a po diet.  Pt will d/c w/ po cephalosporin/flagyl to complete therapy for colitis.     2. Acute hypokalemia  - This was likely 2/2 GI losses from diarrhea, and resolved w/ repletion.      VITAL SIGNS:  Temp:  [98 °F (36.7 °C)-98.6 °F (37 °C)] 98.1 °F (36.7 °C)  Heart Rate:  [73-94] 76  Resp:  [16-20] 16  BP: (106-149)/(52-80) 106/56  SpO2:  [94 %-97 %] 96 %  on   ;   Device (Oxygen Therapy): room air     Body mass index is 32.99 kg/m².      Wt Readings from Last 3 Encounters:   22 79.2 kg (174 lb 9.6 oz)   21 76.7 kg (169 lb 3.2 oz)   21 75.8 kg (167 lb)         PHYSICAL EXAM:  See progress noted for 22     DISCHARGE DISPOSITION   Stable              Discharge Medications            New Medications      Instructions Start Date   cefdinir 300 MG capsule  Commonly known as:  OMNICEF    300 mg, Oral, 2 Times Daily        metroNIDAZOLE 500 MG tablet  Commonly known as: FLAGYL    500 mg, Oral, 3 Times Daily        ondansetron ODT 4 MG disintegrating tablet  Commonly known as: ZOFRAN-ODT    4 mg, Translingual, Every 8 Hours PRN                      Continue These Medications      Instructions Start Date   coenzyme Q10 100 MG capsule    100 mg, Oral, Daily        lisinopril-hydrochlorothiazide 20-12.5 MG per tablet  Commonly known as: PRINZIDE,ZESTORETIC    1 tablet, Oral, Daily        metoprolol succinate XL 25 MG 24 hr tablet  Commonly known as: TOPROL-XL    25 mg, Oral, Nightly        rosuvastatin 20 MG tablet  Commonly known as: CRESTOR    20 mg, Oral, Nightly        Semaglutide (1 MG/DOSE) 2 MG/1.5ML solution pen-injector  Commonly known as: OZEMPIC    1 mg, Subcutaneous, Weekly, Prior to [facility] Admission, Patient was on: takes on Wednesday                 ASK your doctor about these medications      Instructions Start Date   amLODIPine 5 MG tablet  Commonly known as: NORVASC    5 mg, Oral, Daily        aspirin 81 MG EC tablet  Ask about: Which instructions should I use?    81 mg, Oral, Daily        levocetirizine 5 MG tablet  Commonly known as: XYZAL    5 mg, Oral, Every Evening        omeprazole 40 MG capsule  Commonly known as: priLOSEC    40 mg, Oral, Daily                         Follow-up Information           Schedule an appointment as soon as possible for a visit  with Judy Ku MD.    Specialty: Geriatric Medicine  Contact information:  [address]  [id number]  [facility] [address] [phone]                                 TEST  RESULTS PENDING AT DISCHARGE       Pending Labs      Order Current Status     Blood Culture - Blood, Arm, Left Preliminary result     Blood Culture - Blood, Arm, Right Preliminary result             CODE STATUS      Code Status and Medical Interventions:   Ordered at: 01/29/22 3504     Code Status (Patient has no pulse and is not breathing):      CPR (Attempt to Resuscitate)     Medical Interventions (Patient has pulse or is breathing):     Full Support         Suhail Barfield MD  [facility]  02/01/22  10:42 EST     Please note that this discharge summary required more than 30 minutes to complete.

## 2022-02-04 ENCOUNTER — APPOINTMENT (OUTPATIENT)
Dept: MAMMOGRAPHY | Facility: HOSPITAL | Age: 72
End: 2022-02-04

## 2022-02-11 ENCOUNTER — READMISSION MANAGEMENT (OUTPATIENT)
Dept: CALL CENTER | Facility: HOSPITAL | Age: 72
End: 2022-02-11

## 2022-02-11 NOTE — OUTREACH NOTE
Sepsis Week 2 Survey      Responses   [facility] patient discharged from? [facility]   Does the patient have one of the following disease processes/diagnoses(primary or secondary)? Sepsis   Week 2 attempt successful? Yes   Call start time 1223   Call end time 1225   Discharge diagnosis severe sepsis, hypokalemia, pancolitis   Meds reviewed with patient/caregiver? Yes   Is the patient having any side effects they believe may be caused by any medication additions or changes? No   Is the patient taking all medications as directed (includes completed medication regime)? Yes   Comments regarding PCP had telehealth appt 2-8 and labs 2-9   Has the patient kept scheduled appointments due by today? Yes   Psychosocial issues? No   Comments Pt denies fever, N/V/D or abd pain. Appetite still decreased. Still having frequent, soft/loose stools daily.    Did the patient receive a copy of their discharge instructions? Yes   What is the patient's perception of their health status since discharge? Improving   Nursing interventions Nurse provided patient education   Is the patient/caregiver able to teach back Sepsis? S - Shivering,fever or very cold,  E - Extreme pain or generalized discomfort (worst ever,especially abdomen)   Nursing interventions Nurse provided reassurance to patient   Is patient/caregiver able to teach back steps to recovery at home? Rest and regain strength,  Set small, achievable goals for return to baseline health   Is the patient/caregiver able to teach back signs and symptoms of worsening condition: Fever,  Rapid heart rate (>90)   Is the patient/caregiver able to teach back the hierarchy of who to call/visit for symptoms/problems? PCP, Specialist, Home health nurse, Urgent Care, ED, 911 Yes   Week 2 call completed? Yes   Revoked No further contact(revokes)-requires comment   Is the patient interested in additional calls from an ambulatory ?  NOTE:  applies to high risk patients requiring  additional follow-up. No   Graduated/Revoked comments fariba Rao RN

## 2022-02-24 ENCOUNTER — TRANSCRIBE ORDERS (OUTPATIENT)
Dept: ADMINISTRATIVE | Facility: HOSPITAL | Age: 72
End: 2022-02-24

## 2022-02-24 DIAGNOSIS — Z01.818 PRE-OPERATIVE CLEARANCE: Primary | ICD-10-CM

## 2022-02-25 ENCOUNTER — APPOINTMENT (OUTPATIENT)
Dept: MAMMOGRAPHY | Facility: HOSPITAL | Age: 72
End: 2022-02-25

## 2022-03-24 ENCOUNTER — HOSPITAL ENCOUNTER (OUTPATIENT)
Dept: MAMMOGRAPHY | Facility: HOSPITAL | Age: 72
Discharge: HOME | End: 2022-03-24
Admitting: INTERNAL MEDICINE

## 2022-03-24 DIAGNOSIS — Z12.31 VISIT FOR SCREENING MAMMOGRAM: ICD-10-CM

## 2022-03-24 PROCEDURE — 77067 SCR MAMMO BI INCL CAD: CPT

## 2022-03-24 PROCEDURE — 77063 BREAST TOMOSYNTHESIS BI: CPT | Performed by: RADIOLOGY

## 2022-03-24 PROCEDURE — 77067 SCR MAMMO BI INCL CAD: CPT | Performed by: RADIOLOGY

## 2022-03-24 PROCEDURE — 77063 BREAST TOMOSYNTHESIS BI: CPT

## 2022-10-16 NOTE — PROGRESS NOTES
[facility] Cardiology    Encounter Date: 10/18/2022    Patient ID: Nakia Mondragon is a 72 y.o. female.  : 1950     PCP: Judy Ku MD       Chief Complaint: Essential hypertension and flores      PROBLEM LIST:  1. Coronary artery disease  a. Normal coronaries and LV by cath   b. [facility] for abnormal exercise study  (ST depression with normal perfusion): Normal LV function, minor nonobstructive CAD   c. Stress MPS 2020: EF >70%. No evidence of ischemia. Low risk study.    d. Echo, 2020; EF 61-65%. Grade I diastolic dysfunction with impaired relaxation. Mild MR and TR. RVSP from TR is 35-45 mmHg. Mild pulmonary hypertension.   e. Symptoms of exertional angina Spring 2021  f. Stress MPS, 2021; EF 69%. No evidence of ischemia. Low risk study. TID 1.14.  g. LHC, 2021; Minor nonobstructive coronary artery disease is noted. Closure the right common femoral artery.   h. Left ventriculography, 2021; Normal.   i. Bilateral selective coronary angiography, 2021; LM: Normal. LAD: This vessel gives rise to a moderate-sized first diagonal artery proximally. Distal to the origin of this diagonal artery the LAD is a very small vessel that contains no angiographically demonstrable focal obstructive disease. LCX: This is a small nondominant vessel that gives rise to a small first and a larger second marginal artery and posterior left ventricular wall.  The proximal/mid circumflex contains minor nonobstructive plaque. RCA: This is a large dominant vessel that gives rise to posterior descending and posterolateral branches. Nonobstructive mid segment plaque, minor, is noted.   2. Hypertension   3. Hyperlipidemia  4. DM2   5. Family history of premature CAD   6. GERD  7. Covid 19 infection 2020    History of Present Illness  Mrs. Mondragon is a former patient of Dr. Nassar, she presents today for a follow-up with a history of nonobstructive CAD,  exertional chest pain and cardiac risk factors. Since last visit, the patient has been doing well overall from a cardiovascular standpoint. She is a retired public school nurse. Patient attempts to maintain a stable activity level by walking but she states when she begins to walk, she gets chest pressure radiating into the shoulders. She will then sit down and rest for a few minute and the chest pain will subside which at that point she can continue her walk without any difficulty. She followed up with her PCP who added isosorbide mononitrate 20 mg daily to her medications and her anginal symptoms have improved   Her last cardiac catheterization study was in June 2021 by Dr. Nassar. Patient denies any chest pain, shortness of breath, orthopnea, palpitations, edema, dizziness, and syncope.       Allergies   Allergen Reactions   • Bactrim [Sulfamethoxazole-Trimethoprim] Rash   • Ciprofloxacin Rash         Current Outpatient Medications:   •  aspirin 81 MG EC tablet, Take 81 mg by mouth Daily., Disp: , Rfl:   •  coenzyme Q10 100 MG capsule, Take 100 mg by mouth Daily., Disp: , Rfl:   •  isosorbide mononitrate (ISMO,MONOKET) 20 MG tablet, Daily., Disp: , Rfl:   •  levocetirizine (XYZAL) 5 MG tablet, Take 5 mg by mouth Every Evening., Disp: , Rfl:   •  lisinopril-hydrochlorothiazide (PRINZIDE,ZESTORETIC) 20-25 MG per tablet, Daily., Disp: , Rfl:   •  metoprolol succinate XL (TOPROL-XL) 25 MG 24 hr tablet, Take 25 mg by mouth Every Night., Disp: , Rfl:   •  multivitamin with minerals (CENTRUM SILVER 50+WOMEN PO), Take 1 tablet by mouth Daily., Disp: , Rfl:   •  omeprazole (priLOSEC) 40 MG capsule, Take 40 mg by mouth Daily., Disp: , Rfl:   •  prochlorperazine (COMPAZINE) 5 MG tablet, Take 1 tablet by mouth Every 8 (Eight) Hours As Needed for Nausea or Vomiting., Disp: 20 tablet, Rfl: 0  •  rosuvastatin (CRESTOR) 20 MG tablet, Take 20 mg by mouth Every Night., Disp: , Rfl:   •  Semaglutide, 1 MG/DOSE, (OZEMPIC) 2 MG/1.5ML  "solution pen-injector, Inject 1 mg under the skin into the appropriate area as directed 1 (One) Time Per Week. Prior to [facility] Ad[facility], Patient was on: takes on Wednesday, Disp: , Rfl:     The following portions of the patient's history were reviewed and updated as appropriate: allergies, current medications, past family history, past medical history, past social history, past surgical history and problem list.    ROS  Review of Systems   14 point ROS negative except for that listed in the HPI.          Objective:     /74 (BP Location: Left arm, Patient Position: Sitting)   Pulse 79   Ht 154.9 cm (61\")   Wt 76.3 kg (168 lb 3.2 oz)   SpO2 97%   BMI 31.78 kg/m²      Physical Exam  Constitutional: Patient appears well-developed and well-nourished.   HENT: HEENT exam unremarkable.   Neck: Neck supple. No JVD present. No carotid bruits.   Cardiovascular: Normal rate, regular rhythm and normal heart sounds. No murmur heard.   2+ symmetric pulses.   Pulmonary/Chest: Breath sounds normal. Does not exhibit tenderness.   Abdominal: Abdomen benign.   Musculoskeletal: Does not exhibit edema.   Neurological: Neurological exam unremarkable.   Vitals reviewed.    Data Review:   Lab Results   Component Value Date    GLUCOSE 86 02/01/2022    BUN 5 (L) 02/01/2022    CREATININE 0.67 02/01/2022    EGFRIFNONA 87 02/01/2022    BCR 7.5 02/01/2022     (L) 02/01/2022    K 3.7 02/01/2022    CO2 21.1 (L) 02/01/2022    CALCIUM 8.2 (L) 02/01/2022    ALBUMIN 2.91 (L) 02/01/2022    AST 25 02/01/2022    ALT 18 02/01/2022     Lab Results   Component Value Date    CHOL 110 06/07/2021    TRIG 101 06/07/2021    HDL 40 06/07/2021    LDL 51 06/07/2021      Lab Results   Component Value Date    WBC 8.74 02/01/2022    RBC 3.85 02/01/2022    HGB 11.6 (L) 02/01/2022    HCT 35.2 02/01/2022    MCV 91.4 02/01/2022     02/01/2022     Lab Results   Component Value Date    HGBA1C 5.80 (H) 01/29/2022        Procedures         Assessment: "      Diagnosis Plan   1. Coronary artery disease involving native coronary artery of native heart with stable/probably spasm angina .   Overall stable, continue aspirin, metoprolol and statin for GDMT, continue isosorbide mononitrate 20 mg daily and in case of increased angina she can take extra tablet and take nitroglycerin as needed.     2. Essential hypertension  Well controlled. Continue on lisinopril-HCTZ 20-25 mg daily and metoprolol 25 mg nightly for hypertension.     3. Mixed hyperlipidemia  Last LDL (06/2021): 51. Continue on rosuvastatin 20 mg nightly for hyperlipidemia.       Plan:   Stable with occasional angina. Patient was advised if chest pain persist despite usual measures she can take an extra isosorbide or use nitroglycerin as needed.  The findings of nonocclusive disease per most recent cath last year discussed and she was reassured that risk factor management and symptom management is the best strategy at present.  In case of worsening symptoms we can consider further evaluation     Continue current medications.   FU in 12 MO, sooner as needed.  Thank you for allowing us to participate in the care of your patient.       Scribed for Kentrell Sequeira MD by Estelita Milian. 10/18/2022 12:42 EDT         I, Kentrell Sequeira MD, personally performed the services described in this documentation as scribed by the above named individual in my presence, and it is both accurate and complete.  10/19/2022  06:55 EDT      Please note that portions of this note may have been completed with a voice recognition program. Efforts were made to edit the dictations, but occasionally words are mistranscribed.

## 2022-10-18 ENCOUNTER — OFFICE VISIT (OUTPATIENT)
Dept: CARDIOLOGY | Facility: CLINIC | Age: 72
End: 2022-10-18

## 2022-10-18 VITALS
BODY MASS INDEX: 31.75 KG/M2 | HEIGHT: 61 IN | HEART RATE: 79 BPM | OXYGEN SATURATION: 97 % | SYSTOLIC BLOOD PRESSURE: 120 MMHG | WEIGHT: 168.2 LBS | DIASTOLIC BLOOD PRESSURE: 74 MMHG

## 2022-10-18 DIAGNOSIS — I10 ESSENTIAL HYPERTENSION: ICD-10-CM

## 2022-10-18 DIAGNOSIS — E78.2 MIXED HYPERLIPIDEMIA: ICD-10-CM

## 2022-10-18 DIAGNOSIS — I25.118 CORONARY ARTERY DISEASE INVOLVING NATIVE CORONARY ARTERY OF NATIVE HEART WITH OTHER FORM OF ANGINA PECTORIS: Primary | ICD-10-CM

## 2022-10-18 PROCEDURE — 99214 OFFICE O/P EST MOD 30 MIN: CPT | Performed by: INTERNAL MEDICINE

## 2022-10-18 RX ORDER — MULTIPLE VITAMINS W/ MINERALS TAB 9MG-400MCG
1 TAB ORAL DAILY
COMMUNITY

## 2022-10-18 RX ORDER — ISOSORBIDE MONONITRATE 20 MG/1
TABLET ORAL DAILY
COMMUNITY
Start: 2022-09-20

## 2022-10-18 RX ORDER — LISINOPRIL AND HYDROCHLOROTHIAZIDE 25; 20 MG/1; MG/1
TABLET ORAL DAILY
COMMUNITY
Start: 2022-08-14

## 2022-10-24 ENCOUNTER — TELEPHONE (OUTPATIENT)
Dept: INTERVENTIONAL RADIOLOGY/VASCULAR | Facility: HOSPITAL | Age: 72
End: 2022-10-24

## 2022-10-24 NOTE — TELEPHONE ENCOUNTER
Pre-called patient for scheduled IR procedure. Patient requests to reschedule for next month. Patient rescheduled and new appt given to patient.

## 2022-10-27 ENCOUNTER — APPOINTMENT (OUTPATIENT)
Dept: GENERAL RADIOLOGY | Facility: HOSPITAL | Age: 72
End: 2022-10-27

## 2022-12-05 ENCOUNTER — APPOINTMENT (OUTPATIENT)
Dept: GENERAL RADIOLOGY | Facility: HOSPITAL | Age: 72
End: 2022-12-05

## 2022-12-21 ENCOUNTER — TRANSCRIBE ORDERS (OUTPATIENT)
Dept: OTHER | Facility: OTHER | Age: 72
End: 2022-12-21

## 2022-12-21 ENCOUNTER — HOSPITAL ENCOUNTER (OUTPATIENT)
Dept: GENERAL RADIOLOGY | Facility: HOSPITAL | Age: 72
Discharge: HOME | End: 2022-12-21

## 2022-12-21 DIAGNOSIS — M54.50 LOW BACK PAIN, UNSPECIFIED BACK PAIN LATERALITY, UNSPECIFIED CHRONICITY, UNSPECIFIED WHETHER SCIATICA PRESENT: ICD-10-CM

## 2022-12-21 DIAGNOSIS — M54.50 LOW BACK PAIN, UNSPECIFIED BACK PAIN LATERALITY, UNSPECIFIED CHRONICITY, UNSPECIFIED WHETHER SCIATICA PRESENT: Primary | ICD-10-CM

## 2022-12-21 PROCEDURE — 72072 X-RAY EXAM THORAC SPINE 3VWS: CPT | Performed by: RADIOLOGY

## 2022-12-21 PROCEDURE — 72072 X-RAY EXAM THORAC SPINE 3VWS: CPT

## 2022-12-21 PROCEDURE — 72110 X-RAY EXAM L-2 SPINE 4/>VWS: CPT | Performed by: RADIOLOGY

## 2022-12-21 PROCEDURE — 72110 X-RAY EXAM L-2 SPINE 4/>VWS: CPT

## 2023-01-30 ENCOUNTER — EXTERNAL PBMM DATA (OUTPATIENT)
Dept: PHARMACY | Facility: OTHER | Age: 73
End: 2023-01-30
Payer: MEDICARE

## 2023-02-27 ENCOUNTER — EXTERNAL PBMM DATA (OUTPATIENT)
Dept: PHARMACY | Facility: OTHER | Age: 73
End: 2023-02-27
Payer: MEDICARE

## 2023-03-20 ENCOUNTER — TRANSCRIBE ORDERS (OUTPATIENT)
Dept: ADMINISTRATIVE | Facility: HOSPITAL | Age: 73
End: 2023-03-20
Payer: MEDICARE

## 2023-03-20 DIAGNOSIS — M54.16 LUMBAR RADICULOPATHY: Primary | ICD-10-CM

## 2023-04-07 ENCOUNTER — HOSPITAL ENCOUNTER (OUTPATIENT)
Dept: MRI IMAGING | Facility: HOSPITAL | Age: 73
Discharge: HOME | End: 2023-04-07
Admitting: INTERNAL MEDICINE
Payer: MEDICARE

## 2023-04-07 DIAGNOSIS — M54.16 LUMBAR RADICULOPATHY: ICD-10-CM

## 2023-04-07 PROCEDURE — 72148 MRI LUMBAR SPINE W/O DYE: CPT | Performed by: RADIOLOGY

## 2023-04-07 PROCEDURE — 72148 MRI LUMBAR SPINE W/O DYE: CPT

## 2023-09-29 ENCOUNTER — TRANSCRIBE ORDERS (OUTPATIENT)
Dept: ADMINISTRATIVE | Facility: HOSPITAL | Age: 73
End: 2023-09-29
Payer: MEDICARE

## 2023-09-29 DIAGNOSIS — M81.0 AGE-RELATED OSTEOPOROSIS WITHOUT CURRENT PATHOLOGICAL FRACTURE: Primary | ICD-10-CM

## 2023-11-01 ENCOUNTER — OFFICE VISIT (OUTPATIENT)
Dept: SURGERY | Facility: CLINIC | Age: 73
End: 2023-11-01
Payer: MEDICARE

## 2023-11-01 VITALS
HEIGHT: 61 IN | BODY MASS INDEX: 27.9 KG/M2 | DIASTOLIC BLOOD PRESSURE: 68 MMHG | SYSTOLIC BLOOD PRESSURE: 116 MMHG | WEIGHT: 147.8 LBS

## 2023-11-01 DIAGNOSIS — K44.9 HIATAL HERNIA: Primary | ICD-10-CM

## 2023-11-01 NOTE — PROGRESS NOTES
Subjective   Nakia Mondragon is a 73 y.o. female is being seen for consultation today at the request of Judy Ku MD    Nakia Mondragon is a 73 y.o. female History of Present Illness  With chronic GERD and knowledge of hiatal hernia.  She has a large sliding hiatal hernia.  No dysphagia noted.  Patient's main complaint is tightness in the chest when ambulating or exercising.  This has subsided with increased to twice daily PPI therapy.  No blood per rectum.  No unintentional weight loss or hematemesis noted.  Hernia  Associated symptoms include abdominal pain. Pertinent negatives include no chest pain, chills, coughing, fever, headaches, joint swelling, nausea, rash, sore throat, vomiting or weakness.       Past Medical History:   Diagnosis Date    Abnormal ECG     Acid reflux     Arthritis     Breast mass Benign cyst    C. difficile colitis     Cancer 2023    Several skin cancers. Removed by dermatologist    Colon polyp 15-20 years ago  i guess    Removed during colonoscopy by Dr Bianchi    Diabetes mellitus Borderline 20 years ago    Treated with oral meds for a few years, no longer require meds.    Fibrocystic breast 15-20 years ago.    Had benign cyst removed maybe 20 yrs ago.    Hyperlipidemia     Hypertension     PONV (postoperative nausea and vomiting)     Had complete hysterectomy 1992    Postoperative wound infection Not sure    Have had wounds to have staph infection over the years.    Rectal bleeding 01/22    One time episode. Treated in hospital.       Family History   Problem Relation Age of Onset    Heart failure Mother     Heart disease Mother     Miscarriages / Stillbirths Mother     Heart disease Father     Stroke Father     Arthritis Father     Heart disease Sister     Hypertension Sister     Heart attack Brother     Early death Brother         Heart disease    Heart disease Sister     Fibromyalgia Sister     Diabetes Sister         On oral medication    Arthritis Son      Arthritis Son     Heart disease Son     Hyperlipidemia Son     Arthritis Daughter     Cancer Brother         Recently diagnosed with lung cancer.    COPD Brother     Breast cancer Neg Hx        Social History     Socioeconomic History    Marital status:    Tobacco Use    Smoking status: Former     Packs/day: 1.00     Years: 10.00     Additional pack years: 0.00     Total pack years: 10.00     Types: Cigarettes     Quit date: 1974     Years since quittin.5    Smokeless tobacco: Never   Vaping Use    Vaping Use: Never used   Substance and Sexual Activity    Alcohol use: Never    Drug use: Never    Sexual activity: Not Currently     Partners: Male     Birth control/protection: Condom, Coitus interruptus, Hysterectomy     Comment:  only.       Past Surgical History:   Procedure Laterality Date    BREAST BIOPSY Left     benign    BREAST CYST ASPIRATION  20 years ago    Benign    CARDIAC CATHETERIZATION      [facility]     CARDIAC CATHETERIZATION Left 2021    Procedure: Left Heart Cath-- PT c-19 locally. knows to bring results to procedure ;  Surgeon: Angel Nassar MD;  Location: [facility] CATH INVASIVE LOCATION;  Service: Cardiology;  Laterality: Left;    CATARACT EXTRACTION, BILATERAL      EYE SURGERY  10/2011    Cataract removed    FRACTURE SURGERY Left     LEFT ARM    HYSTERECTOMY      benign       Review of Systems   Constitutional:  Negative for activity change, appetite change, chills and fever.   HENT:  Negative for sore throat and trouble swallowing.    Eyes:  Negative for visual disturbance.   Respiratory:  Negative for cough and shortness of breath.    Cardiovascular:  Negative for chest pain and palpitations.   Gastrointestinal:  Positive for abdominal pain. Negative for abdominal distention, blood in stool, constipation, diarrhea, nausea and vomiting.   Endocrine: Negative for cold intolerance and heat intolerance.   Genitourinary:  Negative for dysuria.   Musculoskeletal:  " Negative for joint swelling.   Skin:  Negative for color change, rash and wound.   Allergic/Immunologic: Negative for immunocompromised state.   Neurological:  Negative for dizziness, seizures, weakness and headaches.   Hematological:  Negative for adenopathy. Does not bruise/bleed easily.   Psychiatric/Behavioral:  Negative for agitation and confusion.          /68   Ht 154.9 cm (61\")   Wt 67 kg (147 lb 12.8 oz)   BMI 27.93 kg/m²   Objective   Physical Exam  Constitutional:       Appearance: She is well-developed.   HENT:      Head: Normocephalic and atraumatic.   Eyes:      Conjunctiva/sclera: Conjunctivae normal.      Pupils: Pupils are equal, round, and reactive to light.   Neck:      Thyroid: No thyromegaly.      Vascular: No JVD.      Trachea: No tracheal deviation.   Cardiovascular:      Rate and Rhythm: Normal rate and regular rhythm.      Heart sounds: No murmur heard.     No friction rub. No gallop.   Pulmonary:      Effort: Pulmonary effort is normal.      Breath sounds: Normal breath sounds.   Abdominal:      General: There is no distension.      Palpations: Abdomen is soft. There is no hepatomegaly or splenomegaly.      Tenderness: There is no abdominal tenderness.      Hernia: No hernia is present.   Musculoskeletal:         General: No deformity. Normal range of motion.      Cervical back: Neck supple.   Skin:     General: Skin is warm and dry.   Neurological:      Mental Status: She is alert and oriented to person, place, and time.               Assessment   Diagnoses and all orders for this visit:    1. Hiatal hernia (Primary)  -     Case Request; Standing  -     Case Request    Other orders  -     Follow Anesthesia Guidelines / Protocol; Future  -     Follow Anesthesia Guidelines / Protocol; Standing  -     Verify / Perform Chlorhexidine Skin Prep; Standing  -     Verify / Perform Chlorhexidine Skin Prep if Indicated (If Not Already Completed); Standing  -     Obtain Informed Consent; " Future  -     Provide NPO Instructions to Patient; Future  -     Chlorhexidine Skin Prep; Future      Nakia Hector Mondragon is a 73 y.o. female with large sliding hiatal hernia and symptoms of chest discomfort with ambulation that may be related to her hernia is cardiac work-up is negative.  The patient symptoms have improved greatly with twice daily PPI therapy and she may not require surgical intervention but we will perform EGD to exclude any other underlying abnormalities of the stomach and esophagus in relation to this hiatal hernia.  She is aware of the risks and benefits of the procedure and will proceed.

## 2023-11-10 ENCOUNTER — TELEPHONE (OUTPATIENT)
Dept: SURGERY | Facility: CLINIC | Age: 73
End: 2023-11-10
Payer: MEDICARE

## 2023-11-10 NOTE — TELEPHONE ENCOUNTER
SX: 11/17/23 AT 8:30AM    NPO AFTER MIDNIGHT     MUST HAVE      MUST HOLD BT THREE DAYS PRIOR     MAY TAKE HEART AND/OR BLOOD PRESSURE MEDICATIONS THE MORNING OF SURGERY.    LEFT PATIENT A MESSAGE TO RETURN MY CALL.

## 2023-11-16 RX ORDER — TIRZEPATIDE 7.5 MG/.5ML
INJECTION, SOLUTION SUBCUTANEOUS
COMMUNITY
Start: 2023-10-29

## 2023-12-27 RX ORDER — ISOSORBIDE MONONITRATE 60 MG/1
60 TABLET, EXTENDED RELEASE ORAL NIGHTLY
COMMUNITY

## 2023-12-27 RX ORDER — CELECOXIB 200 MG/1
200 CAPSULE ORAL DAILY
COMMUNITY

## 2023-12-27 RX ORDER — ROSUVASTATIN CALCIUM 20 MG/1
40 TABLET, COATED ORAL DAILY
COMMUNITY

## 2023-12-29 ENCOUNTER — HOSPITAL ENCOUNTER (OUTPATIENT)
Facility: HOSPITAL | Age: 73
Setting detail: HOSPITAL OUTPATIENT SURGERY
Discharge: HOME | End: 2023-12-29
Attending: SURGERY | Admitting: SURGERY
Payer: MEDICARE

## 2023-12-29 ENCOUNTER — ANESTHESIA (OUTPATIENT)
Dept: PERIOP | Facility: HOSPITAL | Age: 73
End: 2023-12-29
Payer: MEDICARE

## 2023-12-29 ENCOUNTER — ANESTHESIA EVENT (OUTPATIENT)
Dept: PERIOP | Facility: HOSPITAL | Age: 73
End: 2023-12-29
Payer: MEDICARE

## 2023-12-29 VITALS
HEART RATE: 55 BPM | DIASTOLIC BLOOD PRESSURE: 60 MMHG | SYSTOLIC BLOOD PRESSURE: 110 MMHG | HEIGHT: 61 IN | WEIGHT: 151 LBS | TEMPERATURE: 97 F | BODY MASS INDEX: 28.51 KG/M2 | OXYGEN SATURATION: 97 % | RESPIRATION RATE: 18 BRPM

## 2023-12-29 DIAGNOSIS — K44.9 HIATAL HERNIA: ICD-10-CM

## 2023-12-29 PROCEDURE — S0260 H&P FOR SURGERY: HCPCS | Performed by: SURGERY

## 2023-12-29 PROCEDURE — 25010000002 PROPOFOL 200 MG/20ML EMULSION: Performed by: NURSE ANESTHETIST, CERTIFIED REGISTERED

## 2023-12-29 PROCEDURE — 88305 TISSUE EXAM BY PATHOLOGIST: CPT

## 2023-12-29 PROCEDURE — 25810000003 LACTATED RINGERS PER 1000 ML: Performed by: ANESTHESIOLOGY

## 2023-12-29 RX ORDER — SODIUM CHLORIDE, SODIUM LACTATE, POTASSIUM CHLORIDE, CALCIUM CHLORIDE 600; 310; 30; 20 MG/100ML; MG/100ML; MG/100ML; MG/100ML
125 INJECTION, SOLUTION INTRAVENOUS ONCE
Status: COMPLETED | OUTPATIENT
Start: 2023-12-29 | End: 2023-12-29

## 2023-12-29 RX ORDER — MEPERIDINE HYDROCHLORIDE 25 MG/ML
12.5 INJECTION INTRAMUSCULAR; INTRAVENOUS; SUBCUTANEOUS
Status: DISCONTINUED | OUTPATIENT
Start: 2023-12-29 | End: 2023-12-29 | Stop reason: HOSPADM

## 2023-12-29 RX ORDER — SODIUM CHLORIDE 9 MG/ML
40 INJECTION, SOLUTION INTRAVENOUS AS NEEDED
Status: DISCONTINUED | OUTPATIENT
Start: 2023-12-29 | End: 2023-12-29 | Stop reason: SDUPTHER

## 2023-12-29 RX ORDER — MIDAZOLAM HYDROCHLORIDE 1 MG/ML
0.5 INJECTION INTRAMUSCULAR; INTRAVENOUS
Status: DISCONTINUED | OUTPATIENT
Start: 2023-12-29 | End: 2023-12-29 | Stop reason: HOSPADM

## 2023-12-29 RX ORDER — MIDAZOLAM HYDROCHLORIDE 1 MG/ML
0.5 INJECTION INTRAMUSCULAR; INTRAVENOUS
Status: DISCONTINUED | OUTPATIENT
Start: 2023-12-29 | End: 2023-12-29 | Stop reason: SDUPTHER

## 2023-12-29 RX ORDER — SODIUM CHLORIDE 0.9 % (FLUSH) 0.9 %
10 SYRINGE (ML) INJECTION AS NEEDED
Status: DISCONTINUED | OUTPATIENT
Start: 2023-12-29 | End: 2023-12-29 | Stop reason: HOSPADM

## 2023-12-29 RX ORDER — SODIUM CHLORIDE 0.9 % (FLUSH) 0.9 %
10 SYRINGE (ML) INJECTION EVERY 12 HOURS SCHEDULED
Status: DISCONTINUED | OUTPATIENT
Start: 2023-12-29 | End: 2023-12-29 | Stop reason: HOSPADM

## 2023-12-29 RX ORDER — PROPOFOL 10 MG/ML
INJECTION, EMULSION INTRAVENOUS AS NEEDED
Status: DISCONTINUED | OUTPATIENT
Start: 2023-12-29 | End: 2023-12-29 | Stop reason: SURG

## 2023-12-29 RX ORDER — SODIUM CHLORIDE, SODIUM LACTATE, POTASSIUM CHLORIDE, CALCIUM CHLORIDE 600; 310; 30; 20 MG/100ML; MG/100ML; MG/100ML; MG/100ML
125 INJECTION, SOLUTION INTRAVENOUS ONCE
Status: DISCONTINUED | OUTPATIENT
Start: 2023-12-29 | End: 2023-12-29 | Stop reason: SDUPTHER

## 2023-12-29 RX ORDER — IPRATROPIUM BROMIDE AND ALBUTEROL SULFATE 2.5; .5 MG/3ML; MG/3ML
3 SOLUTION RESPIRATORY (INHALATION) ONCE AS NEEDED
Status: DISCONTINUED | OUTPATIENT
Start: 2023-12-29 | End: 2023-12-29 | Stop reason: HOSPADM

## 2023-12-29 RX ORDER — SODIUM CHLORIDE 9 MG/ML
40 INJECTION, SOLUTION INTRAVENOUS AS NEEDED
Status: DISCONTINUED | OUTPATIENT
Start: 2023-12-29 | End: 2023-12-29 | Stop reason: HOSPADM

## 2023-12-29 RX ORDER — OXYCODONE HYDROCHLORIDE AND ACETAMINOPHEN 5; 325 MG/1; MG/1
1 TABLET ORAL ONCE AS NEEDED
Status: DISCONTINUED | OUTPATIENT
Start: 2023-12-29 | End: 2023-12-29 | Stop reason: HOSPADM

## 2023-12-29 RX ORDER — ONDANSETRON 2 MG/ML
4 INJECTION INTRAMUSCULAR; INTRAVENOUS AS NEEDED
Status: DISCONTINUED | OUTPATIENT
Start: 2023-12-29 | End: 2023-12-29 | Stop reason: HOSPADM

## 2023-12-29 RX ORDER — FENTANYL CITRATE 50 UG/ML
50 INJECTION, SOLUTION INTRAMUSCULAR; INTRAVENOUS
Status: DISCONTINUED | OUTPATIENT
Start: 2023-12-29 | End: 2023-12-29 | Stop reason: HOSPADM

## 2023-12-29 RX ORDER — KETOROLAC TROMETHAMINE 30 MG/ML
15 INJECTION, SOLUTION INTRAMUSCULAR; INTRAVENOUS EVERY 6 HOURS PRN
Status: DISCONTINUED | OUTPATIENT
Start: 2023-12-29 | End: 2023-12-29 | Stop reason: HOSPADM

## 2023-12-29 RX ORDER — SODIUM CHLORIDE, SODIUM LACTATE, POTASSIUM CHLORIDE, CALCIUM CHLORIDE 600; 310; 30; 20 MG/100ML; MG/100ML; MG/100ML; MG/100ML
100 INJECTION, SOLUTION INTRAVENOUS ONCE AS NEEDED
Status: DISCONTINUED | OUTPATIENT
Start: 2023-12-29 | End: 2023-12-29 | Stop reason: HOSPADM

## 2023-12-29 RX ADMIN — SODIUM CHLORIDE, POTASSIUM CHLORIDE, SODIUM LACTATE AND CALCIUM CHLORIDE: 600; 310; 30; 20 INJECTION, SOLUTION INTRAVENOUS at 08:32

## 2023-12-29 RX ADMIN — PROPOFOL 100 MG: 10 INJECTION, EMULSION INTRAVENOUS at 08:36

## 2023-12-29 NOTE — H&P
Subjective   Nakia Mondragon is a 73 y.o. female is being seen for consultation today at the request of Judy Ku MD     Nakia Mondragon is a 73 y.o. female History of Present Illness  With chronic GERD and knowledge of hiatal hernia.  She has a large sliding hiatal hernia.  No dysphagia noted.  Patient's main complaint is tightness in the chest when ambulating or exercising.  This has subsided with increased to twice daily PPI therapy.  No blood per rectum.  No unintentional weight loss or hematemesis noted.  Hernia  Associated symptoms include abdominal pain. Pertinent negatives include no chest pain, chills, coughing, fever, headaches, joint swelling, nausea, rash, sore throat, vomiting or weakness.         Medical History        Past Medical History:   Diagnosis Date    Abnormal ECG      Acid reflux      Arthritis      Breast mass Benign cyst    C. difficile colitis      Cancer 2023     Several skin cancers. Removed by dermatologist    Colon polyp 15-20 years ago  i guess     Removed during colonoscopy by Dr Bianchi    Diabetes mellitus Borderline 20 years ago     Treated with oral meds for a few years, no longer require meds.    Fibrocystic breast 15-20 years ago.     Had benign cyst removed maybe 20 yrs ago.    Hyperlipidemia      Hypertension      PONV (postoperative nausea and vomiting)       Had complete hysterectomy 1992    Postoperative wound infection Not sure     Have had wounds to have staph infection over the years.    Rectal bleeding 01/22     One time episode. Treated in hospital.                  Family History   Problem Relation Age of Onset    Heart failure Mother      Heart disease Mother      Miscarriages / Stillbirths Mother      Heart disease Father      Stroke Father      Arthritis Father      Heart disease Sister      Hypertension Sister      Heart attack Brother      Early death Brother           Heart disease    Heart disease Sister      Fibromyalgia Sister       Diabetes Sister           On oral medication    Arthritis Son      Arthritis Son      Heart disease Son      Hyperlipidemia Son      Arthritis Daughter      Cancer Brother           Recently diagnosed with lung cancer.    COPD Brother      Breast cancer Neg Hx           Social History   Social History            Socioeconomic History    Marital status:    Tobacco Use    Smoking status: Former       Packs/day: 1.00       Years: 10.00       Additional pack years: 0.00       Total pack years: 10.00       Types: Cigarettes       Quit date: 1974       Years since quittin.5    Smokeless tobacco: Never   Vaping Use    Vaping Use: Never used   Substance and Sexual Activity    Alcohol use: Never    Drug use: Never    Sexual activity: Not Currently       Partners: Male       Birth control/protection: Condom, Coitus interruptus, Hysterectomy       Comment:  only.            Surgical History         Past Surgical History:   Procedure Laterality Date    BREAST BIOPSY Left       benign    BREAST CYST ASPIRATION   20 years ago     Benign    CARDIAC CATHETERIZATION         [facility]     CARDIAC CATHETERIZATION Left 2021     Procedure: Left Heart Cath-- PT c-19 locally. knows to bring results to procedure ;  Surgeon: Angel Nassar MD;  Location: [facility] CATH INVASIVE LOCATION;  Service: Cardiology;  Laterality: Left;    CATARACT EXTRACTION, BILATERAL        EYE SURGERY   10/2011     Cataract removed    FRACTURE SURGERY Left       LEFT ARM    HYSTERECTOMY        benign            Review of Systems   Constitutional:  Negative for activity change, appetite change, chills and fever.   HENT:  Negative for sore throat and trouble swallowing.    Eyes:  Negative for visual disturbance.   Respiratory:  Negative for cough and shortness of breath.    Cardiovascular:  Negative for chest pain and palpitations.   Gastrointestinal:  Positive for abdominal pain. Negative for abdominal distention, blood in  "stool, constipation, diarrhea, nausea and vomiting.   Endocrine: Negative for cold intolerance and heat intolerance.   Genitourinary:  Negative for dysuria.   Musculoskeletal:  Negative for joint swelling.   Skin:  Negative for color change, rash and wound.   Allergic/Immunologic: Negative for immunocompromised state.   Neurological:  Negative for dizziness, seizures, weakness and headaches.   Hematological:  Negative for adenopathy. Does not bruise/bleed easily.   Psychiatric/Behavioral:  Negative for agitation and confusion.             /68   Ht 154.9 cm (61\")   Wt 67 kg (147 lb 12.8 oz)   BMI 27.93 kg/m²         Objective   Physical Exam  Constitutional:       Appearance: She is well-developed.   HENT:      Head: Normocephalic and atraumatic.   Eyes:      Conjunctiva/sclera: Conjunctivae normal.      Pupils: Pupils are equal, round, and reactive to light.   Neck:      Thyroid: No thyromegaly.      Vascular: No JVD.      Trachea: No tracheal deviation.   Cardiovascular:      Rate and Rhythm: Normal rate and regular rhythm.      Heart sounds: No murmur heard.     No friction rub. No gallop.   Pulmonary:      Effort: Pulmonary effort is normal.      Breath sounds: Normal breath sounds.   Abdominal:      General: There is no distension.      Palpations: Abdomen is soft. There is no hepatomegaly or splenomegaly.      Tenderness: There is no abdominal tenderness.      Hernia: No hernia is present.   Musculoskeletal:         General: No deformity. Normal range of motion.      Cervical back: Neck supple.   Skin:     General: Skin is warm and dry.   Neurological:      Mental Status: She is alert and oriented to person, place, and time.                           Assessment   Diagnoses and all orders for this visit:     1. Hiatal hernia (Primary)  -     Case Request; Standing  -     Case Request     Other orders  -     Follow Anesthesia Guidelines / Protocol; Future  -     Follow Anesthesia Guidelines / Protocol; " Standing  -     Verify / Perform Chlorhexidine Skin Prep; Standing  -     Verify / Perform Chlorhexidine Skin Prep if Indicated (If Not Already Completed); Standing  -     Obtain Informed Consent; Future  -     Provide NPO Instructions to Patient; Future  -     Chlorhexidine Skin Prep; Future        Nakia Mondragon is a 73 y.o. female with large sliding hiatal hernia and symptoms of chest discomfort with ambulation that may be related to her hernia is cardiac work-up is negative.  The patient symptoms have improved greatly with twice daily PPI therapy and she may not require surgical intervention but we will perform EGD to exclude any other underlying abnormalities of the stomach and esophagus in relation to this hiatal hernia.  She is aware of the risks and benefits of the procedure and will proceed.

## 2023-12-29 NOTE — ANESTHESIA POSTPROCEDURE EVALUATION
Patient: Nakia Mondragon    Procedure Summary       Date: 12/29/23 Room / Location: [facility] OR [address] [place] [address] COR OR    Anesthesia Start: 0832 Anesthesia Stop: 0840    Procedure: ESOPHAGOGASTRODUODENOSCOPY WITH ANESTHESIA (Esophagus) Diagnosis:       Hiatal hernia      (Hiatal hernia [K44.9])    Surgeons: Donald Yoder MD Provider: Arvin Martínez MD    Anesthesia Type: general ASA Status: 3            Anesthesia Type: general    Vitals  Vitals Value Taken Time   /60 12/29/23 0912   Temp 97 °F (36.1 °C) 12/29/23 0842   Pulse 55 12/29/23 0912   Resp 18 12/29/23 0912   SpO2 97 % 12/29/23 0912           Post Anesthesia Care and Evaluation    Patient location during evaluation: PHASE II  Patient participation: complete - patient participated  Level of consciousness: awake and alert  Pain score: 1  Pain management: adequate    Airway patency: patent  Anesthetic complications: No anesthetic complications  PONV Status: controlled  Cardiovascular status: acceptable  Respiratory status: acceptable  Hydration status: euvolemic  No anesthesia care post op

## 2023-12-29 NOTE — ANESTHESIA PREPROCEDURE EVALUATION
Anesthesia Evaluation     history of anesthetic complications:  PONV  NPO Solid Status: > 8 hours  NPO Liquid Status: > 8 hours           Airway   Mallampati: II  TM distance: >3 FB  Neck ROM: full  No difficulty expected  Dental    (+) edentulous    Pulmonary - normal exam   (+) ,shortness of breath  Cardiovascular - normal exam    (+) hypertension, hyperlipidemia      Neuro/Psych  GI/Hepatic/Renal/Endo    (+) hiatal hernia, GERD, GI bleeding , diabetes mellitus    Musculoskeletal     Abdominal  - normal exam    Bowel sounds: normal.   Substance History      OB/GYN          Other   arthritis,   history of cancer remission    ROS/Med Hx Other: Skin Cancers              Anesthesia Plan    ASA 3     general     intravenous induction     Anesthetic plan, risks, benefits, and alternatives have been provided, discussed and informed consent has been obtained with: patient.    CODE STATUS:

## 2024-01-02 LAB — REF LAB TEST METHOD: NORMAL

## 2024-01-30 ENCOUNTER — OFFICE VISIT (OUTPATIENT)
Dept: CARDIOLOGY | Facility: CLINIC | Age: 74
End: 2024-01-30
Payer: MEDICARE

## 2024-01-30 VITALS
BODY MASS INDEX: 27.83 KG/M2 | DIASTOLIC BLOOD PRESSURE: 80 MMHG | SYSTOLIC BLOOD PRESSURE: 128 MMHG | HEART RATE: 77 BPM | OXYGEN SATURATION: 100 % | HEIGHT: 61 IN | WEIGHT: 147.4 LBS

## 2024-01-30 DIAGNOSIS — I25.118 CORONARY ARTERY DISEASE INVOLVING NATIVE CORONARY ARTERY OF NATIVE HEART WITH OTHER FORM OF ANGINA PECTORIS: Primary | ICD-10-CM

## 2024-01-30 DIAGNOSIS — R07.9 CHEST PAIN WITH LOW RISK FOR CARDIAC ETIOLOGY: ICD-10-CM

## 2024-01-30 DIAGNOSIS — I10 ESSENTIAL HYPERTENSION: ICD-10-CM

## 2024-01-30 DIAGNOSIS — R07.9 EXERTIONAL CHEST PAIN: Primary | ICD-10-CM

## 2024-01-30 DIAGNOSIS — E78.2 MIXED HYPERLIPIDEMIA: ICD-10-CM

## 2024-01-30 PROCEDURE — 3079F DIAST BP 80-89 MM HG: CPT | Performed by: INTERNAL MEDICINE

## 2024-01-30 PROCEDURE — 1159F MED LIST DOCD IN RCRD: CPT | Performed by: INTERNAL MEDICINE

## 2024-01-30 PROCEDURE — 99214 OFFICE O/P EST MOD 30 MIN: CPT | Performed by: INTERNAL MEDICINE

## 2024-01-30 PROCEDURE — 3074F SYST BP LT 130 MM HG: CPT | Performed by: INTERNAL MEDICINE

## 2024-01-30 PROCEDURE — 1160F RVW MEDS BY RX/DR IN RCRD: CPT | Performed by: INTERNAL MEDICINE

## 2024-01-30 RX ORDER — NITROGLYCERIN 0.4 MG/1
TABLET SUBLINGUAL
Qty: 25 TABLET | Refills: 1 | Status: SHIPPED | OUTPATIENT
Start: 2024-01-30

## 2024-01-30 NOTE — PROGRESS NOTES
[facility] Cardiology    Encounter Date: 2024    Patient ID: Nakia Mondragon is a 73 y.o. female.  : 1950     PCP: Judy Ku MD       Chief Complaint: Coronary Artery Disease      PROBLEM LIST:  Coronary artery disease  Normal coronaries and LV by cath   [facility] for abnormal exercise study  (ST depression with normal perfusion): Normal LV function, minor nonobstructive CAD   Stress MPS 2020: EF >70%. No evidence of ischemia. Low risk study.    Echo, 2020; EF 61-65%. Grade I diastolic dysfunction with impaired relaxation. Mild MR and TR. RVSP from TR is 35-45 mmHg. Mild pulmonary hypertension.   Symptoms of exertional angina Spring 2021  Stress MPS, 2021; EF 69%. No evidence of ischemia. Low risk study. TID 1.14.  [facility], 2021; Minor nonobstructive coronary artery disease is noted. Closure the right common femoral artery.   Left ventriculography, 2021; Normal.   Bilateral selective coronary angiography, 2021; LM: Normal. LAD: This vessel gives rise to a moderate-sized first diagonal artery proximally. Distal to the origin of this diagonal artery the LAD is a very small vessel that contains no angiographically demonstrable focal obstructive disease. LCX: This is a small nondominant vessel that gives rise to a small first and a larger second marginal artery and posterior left ventricular wall.  The proximal/mid circumflex contains minor nonobstructive plaque. RCA: This is a large dominant vessel that gives rise to posterior descending and posterolateral branches. Nonobstructive mid segment plaque, minor, is noted.   Hypertension   Hyperlipidemia  DM2   Family history of premature CAD   GERD  Covid 19 infection 2020    History of Present Illness  Patient presents today for a follow-up with a history of CAD and cardiac risk factors. Since last visit, patient has been doing well overall from a cardiovascular standpoint.  She stays busy and active by doing housework and babysitting her grandchildren. Patient notes that she was regularly walking on her treadmill at home until she started experience esophageal/chest pain that radiates into her shoulders. She reports her PCP sent her for a GI evaluation where a large, sliding hiatal hernia was found. Patient notes that since her Prilosec has been increased to twice daily, her esophageal pain has slightly lessened. She denies shortness of breath, orthopnea, palpitations, edema, dizziness, and syncope.     Allergies   Allergen Reactions    Bactrim [Sulfamethoxazole-Trimethoprim] Rash    Ciprofloxacin Rash         Current Outpatient Medications:     aspirin 81 MG EC tablet, Take 1 tablet by mouth Daily., Disp: , Rfl:     celecoxib (CeleBREX) 200 MG capsule, Take 1 capsule by mouth Daily., Disp: , Rfl:     coenzyme Q10 100 MG capsule, Take 1 capsule by mouth Daily., Disp: , Rfl:     isosorbide mononitrate (IMDUR) 60 MG 24 hr tablet, Take 1 tablet by mouth Every Night., Disp: , Rfl:     levocetirizine (XYZAL) 5 MG tablet, Take 1 tablet by mouth Every Evening., Disp: , Rfl:     lisinopril-hydrochlorothiazide (PRINZIDE,ZESTORETIC) 20-25 MG per tablet, 0.25 tablets Daily., Disp: , Rfl:     metoprolol succinate XL (TOPROL-XL) 25 MG 24 hr tablet, Take 1 tablet by mouth Every Night., Disp: , Rfl:     Mounjaro 7.5 MG/0.5ML solution pen-injector, 0.5 mL 1 (One) Time Per Week., Disp: , Rfl:     multivitamin with minerals (CENTRUM SILVER 50+WOMEN PO), Take 1 tablet by mouth Daily., Disp: , Rfl:     omeprazole (priLOSEC) 40 MG capsule, Take 1 capsule by mouth 2 (Two) Times a Day., Disp: , Rfl:     rosuvastatin (CRESTOR) 20 MG tablet, Take 2 tablets by mouth Daily., Disp: , Rfl:     nitroglycerin (NITROSTAT) 0.4 MG SL tablet, 1 under the tongue as needed for angina, may repeat q5mins for up three doses, Disp: 25 tablet, Rfl: 1    The following portions of the patient's history were reviewed and updated  "as appropriate: allergies, current medications, past family history, past medical history, past social history, past surgical history and problem list.    ROS  Review of Systems   14 point ROS negative except for that listed in the HPI.         Objective:     /80 (BP Location: Left arm, Patient Position: Sitting)   Pulse 77   Ht 154.9 cm (61\")   Wt 66.9 kg (147 lb 6.4 oz)   SpO2 100%   BMI 27.85 kg/m²      Physical Exam  Constitutional: Patient appears well-developed and well-nourished.   HENT: HEENT exam unremarkable.   Neck: Neck supple. No JVD present. No carotid bruits.   Cardiovascular: Normal rate, regular rhythm and normal heart sounds. No murmur heard.   2+ symmetric pulses.   Pulmonary/Chest: Breath sounds normal. Does not exhibit tenderness.   Abdominal: Abdomen benign.   Musculoskeletal: Does not exhibit edema.   Neurological: Neurological exam unremarkable.   Vitals reviewed.    Data Review:   Lab date: 09/21/2023  FLP: , TG 81, HDL 46, LDL 51  CMP: Glu 91, BUN 17, Creat 0.93, eGFR 64.6, Na 143, K 3.9, Cl 108, CO2 27.3, Ca 9.3, Alk Phos 74, AST 18, ALT 15  CBC: WBC 6.53, RBC 4.48, HGB 13.2, HCT 39.6, MCV 88.4, MCH 29.5,   HbA1c: 5.5       Procedures     Assessment:      Diagnosis Plan   1. Coronary artery disease involving native coronary artery of native heart with other form of angina pectoris  Stable. Cardiolite stress test ordered to assess for ischemia due to concerns about disease progression with recurrent chest pain.  Start on nitroglycerin 0.4 mg PRN for chest pain. Continue on aspirin 81 mg for antiplatelet therapy. Continue on Imdur 60 mg daily for chest pain.    2. Essential hypertension  Well controlled. Continue on lisinopril HCTZ as directed for hypertension. Continue on metoprolol 25 mg nightly for rate control and hypertension.       3. Mixed hyperlipidemia  Well controlled. Continue on rosuvastatin 40 mg daily for hyperlipidemia.         Plan:   Stable cardiac " status.  Occasional episodes of chest discomfort and shoulder/arm discomfort appear related to esophageal spasm/hiatal hernia however with her risk factors there is a risk of CAD progression therefore we will order a stress test for further assessment.  Her previous cath was negative which was discussed and she was reassured.  Start on nitroglycerin 0.4 mg PRN for chest pain.  Continue all other current medications.   FU in 12 MO, sooner as needed.  Thank you for allowing us to participate in the care of your patient.     Scribed for Kentrell Sequeira MD by Romina Fernandes. 1/30/2024 11:30 EST    I, Kentrell Sequeira MD, personally performed the services described in this documentation as scribed by the above named individual in my presence, and it is both accurate and complete.  1/30/2024  12:30 EST      Please note that portions of this note may have been completed with a voice recognition program. Efforts were made to edit the dictations, but occasionally words are mistranscribed.

## 2024-01-31 ENCOUNTER — OFFICE VISIT (OUTPATIENT)
Dept: SURGERY | Facility: CLINIC | Age: 74
End: 2024-01-31
Payer: MEDICARE

## 2024-01-31 VITALS — WEIGHT: 147 LBS | BODY MASS INDEX: 27.75 KG/M2 | HEIGHT: 61 IN

## 2024-01-31 DIAGNOSIS — K44.9 HIATAL HERNIA: Primary | ICD-10-CM

## 2024-01-31 PROCEDURE — 1159F MED LIST DOCD IN RCRD: CPT | Performed by: SURGERY

## 2024-01-31 PROCEDURE — 99212 OFFICE O/P EST SF 10 MIN: CPT | Performed by: SURGERY

## 2024-01-31 PROCEDURE — 1160F RVW MEDS BY RX/DR IN RCRD: CPT | Performed by: SURGERY

## 2024-01-31 NOTE — PROGRESS NOTES
Subjective   Nakia Mondragon is a 73 y.o. female is being seen for consultation today at the request of Judy Ku MD    Nakia Mondragon is a 73 y.o. female History of Present Illness  With chronic GERD and knowledge of hiatal hernia.  She has a large sliding hiatal hernia.  No dysphagia noted.  Patient's main complaint is tightness in the chest when ambulating or exercising.  This has subsided with increased to twice daily PPI therapy.  No blood per rectum.  No unintentional weight loss or hematemesis noted.  EGD was performed and showed no significant reflux change the hiatal hernia was noted.  She had some gastric evidence of healing erosions and her symptoms have greatly improved since going to twice daily Prilosec.  Hernia  Associated symptoms include abdominal pain. Pertinent negatives include no chest pain, chills, coughing, fever, headaches, joint swelling, nausea, rash, sore throat, vomiting or weakness.       Past Medical History:   Diagnosis Date    Abnormal ECG     Acid reflux     Arthritis     Breast mass Benign cyst    C. difficile colitis     Cancer 2023    Several skin cancers. Removed by dermatologist    Colitis 01/29/2022    Colon polyp 15-20 years ago  i guess    Removed during colonoscopy by Dr Bianchi    Diabetes mellitus Borderline 20 years ago    Treated with oral meds for a few years, no longer require meds.    Elevated cholesterol     Fibrocystic breast 15-20 years ago.    Had benign cyst removed maybe 20 yrs ago.    GI (gastrointestinal bleed)     Hyperlipidemia     Hypertension     PONV (postoperative nausea and vomiting)     Had complete hysterectomy 1992    Postoperative wound infection Not sure    Have had wounds to have staph infection over the years.    Rectal bleeding 01/22    One time episode. Treated in hospital.       Family History   Problem Relation Age of Onset    Heart failure Mother     Heart disease Mother     Miscarriages / Stillbirths Mother     Heart  disease Father     Stroke Father     Arthritis Father     Heart disease Sister         No    Hypertension Sister     Heart attack Brother     Early death Brother         Heart disease    Heart disease Sister     Fibromyalgia Sister     Diabetes Sister         On oral medication    Arthritis Son     Arthritis Son     Heart disease Son     Hyperlipidemia Son     Arthritis Daughter     Cancer Brother         Recently diagnosed with lung cancer.    COPD Brother         Heavy smoker.    Breast cancer Neg Hx        Social History     Socioeconomic History    Marital status:    Tobacco Use    Smoking status: Former     Packs/day: 1.00     Years: 10.00     Additional pack years: 0.00     Total pack years: 10.00     Types: Cigarettes     Quit date: 1974     Years since quittin.8     Passive exposure: Past    Smokeless tobacco: Never   Vaping Use    Vaping Use: Never used   Substance and Sexual Activity    Alcohol use: Never    Drug use: Never    Sexual activity: Not Currently     Partners: Male     Birth control/protection: Condom, Coitus interruptus, Hysterectomy     Comment:  only.       Past Surgical History:   Procedure Laterality Date    BREAST BIOPSY Left     benign    BREAST CYST ASPIRATION  20 years ago    Benign    BREAST CYST ASPIRATION  20 years ago    Benign    BREAST CYST ASPIRATION  20 years ago    BREAST CYST ASPIRATION  20 years ago    CARDIAC CATHETERIZATION      [place]     CARDIAC CATHETERIZATION Left 2021    Procedure: Left Heart Cath-- PT c-19 locally. knows to bring results to procedure ;  Surgeon: Angel Nassar MD;  Location:  [facility];  Service: Cardiology;  Laterality: Left;    CATARACT EXTRACTION, BILATERAL      COLONOSCOPY      ENDOSCOPY      ENDOSCOPY N/A 2023    Procedure: ESOPHAGOGASTRODUODENOSCOPY WITH ANESTHESIA;  Surgeon: Donald Yoder MD;  Location: [facility];  Service: Gastroenterology;  Laterality: N/A;    EYE SURGERY  " 10/2011    Cataract removed    FRACTURE SURGERY Left     LEFT ARM    HYSTERECTOMY  1992    benign       Review of Systems   Constitutional:  Negative for activity change, appetite change, chills and fever.   HENT:  Negative for sore throat and trouble swallowing.    Eyes:  Negative for visual disturbance.   Respiratory:  Negative for cough and shortness of breath.    Cardiovascular:  Negative for chest pain and palpitations.   Gastrointestinal:  Positive for abdominal pain. Negative for abdominal distention, blood in stool, constipation, diarrhea, nausea and vomiting.   Endocrine: Negative for cold intolerance and heat intolerance.   Genitourinary:  Negative for dysuria.   Musculoskeletal:  Negative for joint swelling.   Skin:  Negative for color change, rash and wound.   Allergic/Immunologic: Negative for immunocompromised state.   Neurological:  Negative for dizziness, seizures, weakness and headaches.   Hematological:  Negative for adenopathy. Does not bruise/bleed easily.   Psychiatric/Behavioral:  Negative for agitation and confusion.          Ht 154.9 cm (61\")   Wt 66.7 kg (147 lb)   BMI 27.78 kg/m²   Objective   Physical Exam  Constitutional:       Appearance: She is well-developed.   HENT:      Head: Normocephalic and atraumatic.   Eyes:      Conjunctiva/sclera: Conjunctivae normal.      Pupils: Pupils are equal, round, and reactive to light.   Neck:      Thyroid: No thyromegaly.      Vascular: No JVD.      Trachea: No tracheal deviation.   Cardiovascular:      Rate and Rhythm: Normal rate and regular rhythm.      Heart sounds: No murmur heard.     No friction rub. No gallop.   Pulmonary:      Effort: Pulmonary effort is normal.      Breath sounds: Normal breath sounds.   Abdominal:      General: There is no distension.      Palpations: Abdomen is soft. There is no hepatomegaly or splenomegaly.      Tenderness: There is no abdominal tenderness.      Hernia: No hernia is present.   Musculoskeletal:         " General: No deformity. Normal range of motion.      Cervical back: Neck supple.   Skin:     General: Skin is warm and dry.   Neurological:      Mental Status: She is alert and oriented to person, place, and time.               Assessment   Diagnoses and all orders for this visit:    1. Hiatal hernia (Primary)      Nakia Mondragon is a 73 y.o. female with large sliding hiatal hernia and symptoms of chest discomfort with ambulation that may be related to her hernia is cardiac work-up is negative.  The patient symptoms have improved greatly with twice daily PPI therapy and she may not require surgical intervention and would like to defer surgery at this time.  We will continue with twice daily PPI therapy and follow-up in 1 month.

## 2024-02-09 ENCOUNTER — HOSPITAL ENCOUNTER (OUTPATIENT)
Facility: HOSPITAL | Age: 74
Discharge: HOME | End: 2024-02-09
Payer: MEDICARE

## 2024-02-09 DIAGNOSIS — M81.0 AGE-RELATED OSTEOPOROSIS WITHOUT CURRENT PATHOLOGICAL FRACTURE: ICD-10-CM

## 2024-02-09 PROCEDURE — 77080 DXA BONE DENSITY AXIAL: CPT

## 2024-02-21 ENCOUNTER — HOSPITAL ENCOUNTER (OUTPATIENT)
Dept: NUCLEAR MEDICINE | Facility: HOSPITAL | Age: 74
Discharge: HOME | End: 2024-02-21
Payer: MEDICARE

## 2024-02-21 ENCOUNTER — HOSPITAL ENCOUNTER (OUTPATIENT)
Dept: CARDIOLOGY | Facility: HOSPITAL | Age: 74
Discharge: HOME | End: 2024-02-21
Payer: MEDICARE

## 2024-02-21 DIAGNOSIS — R07.9 CHEST PAIN WITH LOW RISK FOR CARDIAC ETIOLOGY: ICD-10-CM

## 2024-02-21 DIAGNOSIS — R07.9 EXERTIONAL CHEST PAIN: ICD-10-CM

## 2024-02-21 LAB
BH CV NUCLEAR PRIOR STUDY: 3
BH CV REST NUCLEAR ISOTOPE DOSE: 9.8 MCI
BH CV STRESS BP STAGE 1: NORMAL
BH CV STRESS BP STAGE 2: NORMAL
BH CV STRESS DURATION MIN STAGE 1: 3
BH CV STRESS DURATION MIN STAGE 2: 2
BH CV STRESS DURATION SEC STAGE 1: 0
BH CV STRESS DURATION SEC STAGE 2: 37
BH CV STRESS GRADE STAGE 1: 10
BH CV STRESS GRADE STAGE 2: 12
BH CV STRESS HR STAGE 1: 107
BH CV STRESS HR STAGE 2: 123
BH CV STRESS METS STAGE 1: 5
BH CV STRESS METS STAGE 2: 7.5
BH CV STRESS NUCLEAR ISOTOPE DOSE: 29.8 MCI
BH CV STRESS PROTOCOL 1: NORMAL
BH CV STRESS RECOVERY BP: NORMAL MMHG
BH CV STRESS RECOVERY HR: 97 BPM
BH CV STRESS SPEED STAGE 1: 1.7
BH CV STRESS SPEED STAGE 2: 2.5
BH CV STRESS STAGE 1: 1
BH CV STRESS STAGE 2: 2
LV EF NUC BP: 92 %
MAXIMAL PREDICTED HEART RATE: 146 BPM
PERCENT MAX PREDICTED HR: 84.25 %
STRESS BASELINE BP: NORMAL MMHG
STRESS BASELINE HR: 80 BPM
STRESS PERCENT HR: 99 %
STRESS POST ESTIMATED WORKLOAD: 7.3 METS
STRESS POST EXERCISE DUR MIN: 5 MIN
STRESS POST EXERCISE DUR SEC: 37 SEC
STRESS POST PEAK BP: NORMAL MMHG
STRESS POST PEAK HR: 123 BPM
STRESS TARGET HR: 124 BPM

## 2024-02-21 PROCEDURE — 93018 CV STRESS TEST I&R ONLY: CPT | Performed by: INTERNAL MEDICINE

## 2024-02-21 PROCEDURE — 0 TECHNETIUM SESTAMIBI: Performed by: INTERNAL MEDICINE

## 2024-02-21 PROCEDURE — A9500 TC99M SESTAMIBI: HCPCS | Performed by: INTERNAL MEDICINE

## 2024-02-21 PROCEDURE — 78452 HT MUSCLE IMAGE SPECT MULT: CPT

## 2024-02-21 PROCEDURE — 78452 HT MUSCLE IMAGE SPECT MULT: CPT | Performed by: INTERNAL MEDICINE

## 2024-02-21 PROCEDURE — 93017 CV STRESS TEST TRACING ONLY: CPT

## 2024-02-21 RX ADMIN — TECHNETIUM TC 99M SESTAMIBI 1 DOSE: 1 INJECTION INTRAVENOUS at 11:30

## 2024-02-21 RX ADMIN — TECHNETIUM TC 99M SESTAMIBI 1 DOSE: 1 INJECTION INTRAVENOUS at 12:35

## 2024-02-28 ENCOUNTER — OFFICE VISIT (OUTPATIENT)
Dept: SURGERY | Facility: CLINIC | Age: 74
End: 2024-02-28
Payer: MEDICARE

## 2024-02-28 VITALS — BODY MASS INDEX: 27.75 KG/M2 | WEIGHT: 147 LBS | HEIGHT: 61 IN

## 2024-02-28 DIAGNOSIS — K44.9 HIATAL HERNIA: Primary | ICD-10-CM

## 2024-02-28 PROCEDURE — 1159F MED LIST DOCD IN RCRD: CPT | Performed by: SURGERY

## 2024-02-28 PROCEDURE — 1160F RVW MEDS BY RX/DR IN RCRD: CPT | Performed by: SURGERY

## 2024-02-28 PROCEDURE — 99213 OFFICE O/P EST LOW 20 MIN: CPT | Performed by: SURGERY

## 2024-02-28 NOTE — PROGRESS NOTES
Subjective   Nakia Mondragon is a 73 y.o. female is being seen for consultation today at the request of Judy Ku MD    Nakia Mondragon is a 73 y.o. female History of Present Illness  With chronic GERD and knowledge of hiatal hernia.  She has a large sliding hiatal hernia.  No dysphagia noted.  Patient's main complaint is tightness in the chest when ambulating or exercising.  This has subsided with increased to twice daily PPI therapy.  No blood per rectum.  No unintentional weight loss or hematemesis noted.  EGD was performed and showed no significant reflux change the hiatal hernia was noted.  She had some gastric evidence of healing erosions and her symptoms have greatly improved since going to twice daily Prilosec.  The patient is on Mounjaro which may contribute given the decreased gastric motility from this medication.  Hernia  Associated symptoms include abdominal pain. Pertinent negatives include no chest pain, chills, coughing, fever, headaches, joint swelling, nausea, rash, sore throat, vomiting or weakness.       Past Medical History:   Diagnosis Date    Abnormal ECG     Acid reflux     Arthritis     Breast mass Benign cyst    C. difficile colitis     Cancer 2023    Several skin cancers. Removed by dermatologist    Colitis 01/29/2022    Colon polyp 15-20 years ago  i guess    Removed during colonoscopy by Dr Bianchi    Diabetes mellitus Borderline 20 years ago    Treated with oral meds for a few years, no longer require meds.    Elevated cholesterol     Fibrocystic breast 15-20 years ago.    Had benign cyst removed maybe 20 yrs ago.    GI (gastrointestinal bleed)     Hyperlipidemia     Hypertension     PONV (postoperative nausea and vomiting)     Had complete hysterectomy 1992    Postoperative wound infection Not sure    Have had wounds to have staph infection over the years.    Rectal bleeding 01/22    One time episode. Treated in hospital.       Family History   Problem Relation  Age of Onset    Heart failure Mother     Heart disease Mother     Miscarriages / Stillbirths Mother     Heart disease Father     Stroke Father     Arthritis Father     Heart disease Sister         No    Hypertension Sister     Heart attack Brother     Early death Brother         Heart disease    Heart disease Sister     Fibromyalgia Sister     Diabetes Sister         On oral medication    Arthritis Son     Arthritis Son     Heart disease Son     Hyperlipidemia Son     Arthritis Daughter     Cancer Brother         Recently diagnosed with lung cancer.    COPD Brother         Heavy smoker.    Breast cancer Neg Hx        Social History     Socioeconomic History    Marital status:    Tobacco Use    Smoking status: Former     Packs/day: 1.00     Years: 10.00     Additional pack years: 0.00     Total pack years: 10.00     Types: Cigarettes     Quit date: 1974     Years since quittin.8     Passive exposure: Past    Smokeless tobacco: Never   Vaping Use    Vaping Use: Never used   Substance and Sexual Activity    Alcohol use: Never    Drug use: Never    Sexual activity: Not Currently     Partners: Male     Birth control/protection: Condom, Coitus interruptus, Hysterectomy     Comment:  only.       Past Surgical History:   Procedure Laterality Date    BREAST BIOPSY Left     benign    BREAST CYST ASPIRATION  20 years ago    Benign    BREAST CYST ASPIRATION  20 years ago    Benign    BREAST CYST ASPIRATION  20 years ago    BREAST CYST ASPIRATION  20 years ago    CARDIAC CATHETERIZATION      [place]     CARDIAC CATHETERIZATION Left 2021    Procedure: Left Heart Cath-- PT c-19 locally. knows to bring results to procedure ;  Surgeon: Angel Nassar MD;  Location: [facility] INVASIVE LOCATION;  Service: Cardiology;  Laterality: Left;    CATARACT EXTRACTION, BILATERAL      COLONOSCOPY      ENDOSCOPY      ENDOSCOPY N/A 2023    Procedure: ESOPHAGOGASTRODUODENOSCOPY WITH ANESTHESIA;   "Surgeon: Donald Yoder MD;  Location: [facility];  Service: Gastroenterology;  Laterality: N/A;    EYE SURGERY  10/2011    Cataract removed    FRACTURE SURGERY Left     LEFT ARM    HYSTERECTOMY  1992    benign       Review of Systems   Constitutional:  Negative for activity change, appetite change, chills and fever.   HENT:  Negative for sore throat and trouble swallowing.    Eyes:  Negative for visual disturbance.   Respiratory:  Negative for cough and shortness of breath.    Cardiovascular:  Negative for chest pain and palpitations.   Gastrointestinal:  Positive for abdominal pain. Negative for abdominal distention, blood in stool, constipation, diarrhea, nausea and vomiting.   Endocrine: Negative for cold intolerance and heat intolerance.   Genitourinary:  Negative for dysuria.   Musculoskeletal:  Negative for joint swelling.   Skin:  Negative for color change, rash and wound.   Allergic/Immunologic: Negative for immunocompromised state.   Neurological:  Negative for dizziness, seizures, weakness and headaches.   Hematological:  Negative for adenopathy. Does not bruise/bleed easily.   Psychiatric/Behavioral:  Negative for agitation and confusion.          Ht 154.9 cm (61\")   Wt 66.7 kg (147 lb)   BMI 27.78 kg/m²   Objective   Physical Exam  Constitutional:       Appearance: She is well-developed.   HENT:      Head: Normocephalic and atraumatic.   Eyes:      Conjunctiva/sclera: Conjunctivae normal.      Pupils: Pupils are equal, round, and reactive to light.   Neck:      Thyroid: No thyromegaly.      Vascular: No JVD.      Trachea: No tracheal deviation.   Cardiovascular:      Rate and Rhythm: Normal rate and regular rhythm.      Heart sounds: No murmur heard.     No friction rub. No gallop.   Pulmonary:      Effort: Pulmonary effort is normal.      Breath sounds: Normal breath sounds.   Abdominal:      General: There is no distension.      Palpations: Abdomen is soft. There is no hepatomegaly or " splenomegaly.      Tenderness: There is no abdominal tenderness.      Hernia: No hernia is present.   Musculoskeletal:         General: No deformity. Normal range of motion.      Cervical back: Neck supple.   Skin:     General: Skin is warm and dry.   Neurological:      Mental Status: She is alert and oriented to person, place, and time.               Assessment   Diagnoses and all orders for this visit:    1. Hiatal hernia (Primary)      Nakia Mondragon is a 73 y.o. female with large sliding hiatal hernia and symptoms of chest discomfort with ambulation that may be related to her hernia is cardiac work-up is negative.  Patient will continue twice daily PPI therapy and lifestyle and dietary modification.  Her symptoms may be exacerbated by Mounjaro given its effects on gastric motility which may contribute to GERD despite medical management.  I will see the patient in 2 months during which time after discussion with her primary care physician she may consider discontinuing Mounjaro to see if this decreases her reflux symptomatology though the risk-benefit is recurrent weight gain if she is unable to maintain her successful weight loss while off this medication.

## 2024-03-19 LAB
ALBUMIN SERPL-MCNC: 4.1 G/DL (ref 3.5–5.2)
ALBUMIN/GLOB SERPL: 1.9 G/DL
ALP SERPL-CCNC: 79 U/L (ref 39–117)
ALT SERPL W P-5'-P-CCNC: 21 U/L (ref 1–33)
ANION GAP SERPL CALCULATED.3IONS-SCNC: 7.7 MMOL/L (ref 5–15)
AST SERPL-CCNC: 25 U/L (ref 1–32)
BACTERIA UR QL AUTO: ABNORMAL /HPF
BASOPHILS # BLD AUTO: 0.03 10*3/MM3 (ref 0–0.2)
BASOPHILS NFR BLD AUTO: 0.3 % (ref 0–1.5)
BILIRUB SERPL-MCNC: 0.3 MG/DL (ref 0–1.2)
BILIRUB UR QL STRIP: NEGATIVE
BUN SERPL-MCNC: 20 MG/DL (ref 8–23)
BUN/CREAT SERPL: 19.8 (ref 7–25)
CALCIUM SPEC-SCNC: 9.3 MG/DL (ref 8.6–10.5)
CHLORIDE SERPL-SCNC: 112 MMOL/L (ref 98–107)
CLARITY UR: CLEAR
CO2 SERPL-SCNC: 24.3 MMOL/L (ref 22–29)
COLOR UR: YELLOW
CREAT SERPL-MCNC: 1.01 MG/DL (ref 0.57–1)
DEPRECATED RDW RBC AUTO: 49 FL (ref 37–54)
EGFRCR SERPLBLD CKD-EPI 2021: 58.5 ML/MIN/1.73
EOSINOPHIL # BLD AUTO: 0.13 10*3/MM3 (ref 0–0.4)
EOSINOPHIL NFR BLD AUTO: 1.5 % (ref 0.3–6.2)
ERYTHROCYTE [DISTWIDTH] IN BLOOD BY AUTOMATED COUNT: 14.5 % (ref 12.3–15.4)
FLUAV RNA RESP QL NAA+PROBE: NOT DETECTED
FLUBV RNA ISLT QL NAA+PROBE: NOT DETECTED
GLOBULIN UR ELPH-MCNC: 2.2 GM/DL
GLUCOSE SERPL-MCNC: 107 MG/DL (ref 65–99)
GLUCOSE UR STRIP-MCNC: NEGATIVE MG/DL
HCT VFR BLD AUTO: 38.9 % (ref 34–46.6)
HGB BLD-MCNC: 12.5 G/DL (ref 12–15.9)
HGB UR QL STRIP.AUTO: NEGATIVE
HYALINE CASTS UR QL AUTO: ABNORMAL /LPF
IMM GRANULOCYTES # BLD AUTO: 0.02 10*3/MM3 (ref 0–0.05)
IMM GRANULOCYTES NFR BLD AUTO: 0.2 % (ref 0–0.5)
KETONES UR QL STRIP: NEGATIVE
LEUKOCYTE ESTERASE UR QL STRIP.AUTO: ABNORMAL
LYMPHOCYTES # BLD AUTO: 0.63 10*3/MM3 (ref 0.7–3.1)
LYMPHOCYTES NFR BLD AUTO: 7.3 % (ref 19.6–45.3)
MCH RBC QN AUTO: 30.1 PG (ref 26.6–33)
MCHC RBC AUTO-ENTMCNC: 32.1 G/DL (ref 31.5–35.7)
MCV RBC AUTO: 93.7 FL (ref 79–97)
MONOCYTES # BLD AUTO: 0.43 10*3/MM3 (ref 0.1–0.9)
MONOCYTES NFR BLD AUTO: 5 % (ref 5–12)
NEUTROPHILS NFR BLD AUTO: 7.38 10*3/MM3 (ref 1.7–7)
NEUTROPHILS NFR BLD AUTO: 85.7 % (ref 42.7–76)
NITRITE UR QL STRIP: NEGATIVE
NRBC BLD AUTO-RTO: 0 /100 WBC (ref 0–0.2)
PH UR STRIP.AUTO: >=9 [PH] (ref 5–8)
PLATELET # BLD AUTO: 172 10*3/MM3 (ref 140–450)
PMV BLD AUTO: 10 FL (ref 6–12)
POTASSIUM SERPL-SCNC: 4.2 MMOL/L (ref 3.5–5.2)
PROT SERPL-MCNC: 6.3 G/DL (ref 6–8.5)
PROT UR QL STRIP: ABNORMAL
RBC # BLD AUTO: 4.15 10*6/MM3 (ref 3.77–5.28)
RBC # UR STRIP: ABNORMAL /HPF
REF LAB TEST METHOD: ABNORMAL
S PYO AG THROAT QL: NEGATIVE
SARS-COV-2 RNA RESP QL NAA+PROBE: NOT DETECTED
SODIUM SERPL-SCNC: 144 MMOL/L (ref 136–145)
SP GR UR STRIP: 1.02 (ref 1–1.03)
SQUAMOUS #/AREA URNS HPF: ABNORMAL /HPF
UROBILINOGEN UR QL STRIP: ABNORMAL
WBC # UR STRIP: ABNORMAL /HPF
WBC NRBC COR # BLD AUTO: 8.62 10*3/MM3 (ref 3.4–10.8)

## 2024-03-19 PROCEDURE — 87186 SC STD MICRODIL/AGAR DIL: CPT | Performed by: STUDENT IN AN ORGANIZED HEALTH CARE EDUCATION/TRAINING PROGRAM

## 2024-03-19 PROCEDURE — 87081 CULTURE SCREEN ONLY: CPT | Performed by: STUDENT IN AN ORGANIZED HEALTH CARE EDUCATION/TRAINING PROGRAM

## 2024-03-19 PROCEDURE — 87086 URINE CULTURE/COLONY COUNT: CPT | Performed by: STUDENT IN AN ORGANIZED HEALTH CARE EDUCATION/TRAINING PROGRAM

## 2024-03-19 PROCEDURE — 36415 COLL VENOUS BLD VENIPUNCTURE: CPT

## 2024-03-19 PROCEDURE — 81001 URINALYSIS AUTO W/SCOPE: CPT | Performed by: STUDENT IN AN ORGANIZED HEALTH CARE EDUCATION/TRAINING PROGRAM

## 2024-03-19 PROCEDURE — 86140 C-REACTIVE PROTEIN: CPT | Performed by: PHYSICIAN ASSISTANT

## 2024-03-19 PROCEDURE — 84145 PROCALCITONIN (PCT): CPT | Performed by: PHYSICIAN ASSISTANT

## 2024-03-19 PROCEDURE — 87636 SARSCOV2 & INF A&B AMP PRB: CPT | Performed by: STUDENT IN AN ORGANIZED HEALTH CARE EDUCATION/TRAINING PROGRAM

## 2024-03-19 PROCEDURE — 80053 COMPREHEN METABOLIC PANEL: CPT | Performed by: STUDENT IN AN ORGANIZED HEALTH CARE EDUCATION/TRAINING PROGRAM

## 2024-03-19 PROCEDURE — 87880 STREP A ASSAY W/OPTIC: CPT | Performed by: STUDENT IN AN ORGANIZED HEALTH CARE EDUCATION/TRAINING PROGRAM

## 2024-03-19 PROCEDURE — 85025 COMPLETE CBC W/AUTO DIFF WBC: CPT | Performed by: STUDENT IN AN ORGANIZED HEALTH CARE EDUCATION/TRAINING PROGRAM

## 2024-03-19 PROCEDURE — 96372 THER/PROPH/DIAG INJ SC/IM: CPT

## 2024-03-19 PROCEDURE — 99283 EMERGENCY DEPT VISIT LOW MDM: CPT

## 2024-03-19 PROCEDURE — 87077 CULTURE AEROBIC IDENTIFY: CPT | Performed by: STUDENT IN AN ORGANIZED HEALTH CARE EDUCATION/TRAINING PROGRAM

## 2024-03-19 RX ORDER — ACETAMINOPHEN 500 MG
1000 TABLET ORAL ONCE
Status: COMPLETED | OUTPATIENT
Start: 2024-03-19 | End: 2024-03-20

## 2024-03-20 ENCOUNTER — HOSPITAL ENCOUNTER (EMERGENCY)
Facility: HOSPITAL | Age: 74
Discharge: HOME | End: 2024-03-20
Attending: STUDENT IN AN ORGANIZED HEALTH CARE EDUCATION/TRAINING PROGRAM | Admitting: STUDENT IN AN ORGANIZED HEALTH CARE EDUCATION/TRAINING PROGRAM
Payer: MEDICARE

## 2024-03-20 VITALS
WEIGHT: 144 LBS | HEART RATE: 92 BPM | SYSTOLIC BLOOD PRESSURE: 142 MMHG | OXYGEN SATURATION: 96 % | DIASTOLIC BLOOD PRESSURE: 79 MMHG | TEMPERATURE: 98.2 F | BODY MASS INDEX: 27.19 KG/M2 | RESPIRATION RATE: 18 BRPM | HEIGHT: 61 IN

## 2024-03-20 DIAGNOSIS — R39.9 UTI SYMPTOMS: Primary | ICD-10-CM

## 2024-03-20 LAB
B PARAPERT DNA SPEC QL NAA+PROBE: NOT DETECTED
B PERT DNA SPEC QL NAA+PROBE: NOT DETECTED
C PNEUM DNA NPH QL NAA+NON-PROBE: NOT DETECTED
CRP SERPL-MCNC: 0.69 MG/DL (ref 0–0.5)
FLUAV SUBTYP SPEC NAA+PROBE: NOT DETECTED
FLUBV RNA ISLT QL NAA+PROBE: NOT DETECTED
HADV DNA SPEC NAA+PROBE: NOT DETECTED
HCOV 229E RNA SPEC QL NAA+PROBE: NOT DETECTED
HCOV HKU1 RNA SPEC QL NAA+PROBE: NOT DETECTED
HCOV NL63 RNA SPEC QL NAA+PROBE: NOT DETECTED
HCOV OC43 RNA SPEC QL NAA+PROBE: NOT DETECTED
HMPV RNA NPH QL NAA+NON-PROBE: NOT DETECTED
HOLD SPECIMEN: NORMAL
HOLD SPECIMEN: NORMAL
HPIV1 RNA ISLT QL NAA+PROBE: NOT DETECTED
HPIV2 RNA SPEC QL NAA+PROBE: NOT DETECTED
HPIV3 RNA NPH QL NAA+PROBE: NOT DETECTED
HPIV4 P GENE NPH QL NAA+PROBE: NOT DETECTED
M PNEUMO IGG SER IA-ACNC: NOT DETECTED
PROCALCITONIN SERPL-MCNC: 0.04 NG/ML (ref 0–0.25)
RHINOVIRUS RNA SPEC NAA+PROBE: NOT DETECTED
RSV RNA NPH QL NAA+NON-PROBE: NOT DETECTED
SARS-COV-2 RNA NPH QL NAA+NON-PROBE: NOT DETECTED
WHOLE BLOOD HOLD COAG: NORMAL
WHOLE BLOOD HOLD SPECIMEN: NORMAL

## 2024-03-20 PROCEDURE — 96372 THER/PROPH/DIAG INJ SC/IM: CPT

## 2024-03-20 PROCEDURE — 25010000002 CEFTRIAXONE PER 250 MG: Performed by: PHYSICIAN ASSISTANT

## 2024-03-20 PROCEDURE — 0202U NFCT DS 22 TRGT SARS-COV-2: CPT | Performed by: PHYSICIAN ASSISTANT

## 2024-03-20 RX ORDER — PHENAZOPYRIDINE HYDROCHLORIDE 200 MG/1
200 TABLET, FILM COATED ORAL ONCE
Status: COMPLETED | OUTPATIENT
Start: 2024-03-20 | End: 2024-03-20

## 2024-03-20 RX ORDER — PHENAZOPYRIDINE HYDROCHLORIDE 200 MG/1
200 TABLET, FILM COATED ORAL 3 TIMES DAILY PRN
Qty: 21 TABLET | Refills: 0 | Status: SHIPPED | OUTPATIENT
Start: 2024-03-20

## 2024-03-20 RX ORDER — CEFDINIR 300 MG/1
300 CAPSULE ORAL 2 TIMES DAILY
Qty: 20 CAPSULE | Refills: 0 | Status: SHIPPED | OUTPATIENT
Start: 2024-03-20

## 2024-03-20 RX ADMIN — LIDOCAINE HYDROCHLORIDE 1 G: 10 INJECTION, SOLUTION EPIDURAL; INFILTRATION; INTRACAUDAL; PERINEURAL at 05:07

## 2024-03-20 RX ADMIN — ACETAMINOPHEN 1000 MG: 500 TABLET ORAL at 02:42

## 2024-03-20 RX ADMIN — PHENAZOPYRIDINE HYDROCHLORIDE 200 MG: 200 TABLET ORAL at 05:06

## 2024-03-20 NOTE — ED NOTES
MEDICAL SCREENING:    Reason for Visit: Dysuria    Patient initially seen in triage.  The patient was advised further evaluation and diagnostic testing will be needed, some of the treatment and testing will be initiated in the lobby in order to begin the process.  The patient will be returned to the waiting area for the time being and possibly be re-assessed by a subsequent ED provider.  The patient will be brought back to the treatment area in as timely manner as possible.     Amira Zapata, APRN  03/19/24 2124

## 2024-03-20 NOTE — ED PROVIDER NOTES
Subjective     History provided by:  Patient  Dysuria  Pain quality:  Stabbing and burning  Pain severity:  Moderate  Onset quality:  Sudden  Duration:  3 days  Timing:  Constant  Progression:  Worsening  Chronicity:  Recurrent  Recent urinary tract infections: yes (tx w/ omnicef)    Ineffective treatments:  None tried  Associated symptoms: fever    Associated symptoms: no abdominal pain    Risk factors: recurrent urinary tract infections        Review of Systems   Constitutional:  Positive for fever.   HENT: Negative.     Respiratory: Negative.     Cardiovascular: Negative.  Negative for chest pain.   Gastrointestinal: Negative.  Negative for abdominal pain.   Endocrine: Negative.    Genitourinary:  Positive for dysuria.   Skin: Negative.    Neurological: Negative.    Psychiatric/Behavioral: Negative.     All other systems reviewed and are negative.      Past Medical History:   Diagnosis Date    Abnormal ECG     Acid reflux     Arthritis     Breast mass Benign cyst    C. difficile colitis     Cancer 2023    Several skin cancers. Removed by dermatologist    Colitis 01/29/2022    Colon polyp 15-20 years ago  i guess    Removed during colonoscopy by Dr Bianchi    Diabetes mellitus Borderline 20 years ago    Treated with oral meds for a few years, no longer require meds.    Elevated cholesterol     Fibrocystic breast 15-20 years ago.    Had benign cyst removed maybe 20 yrs ago.    GI (gastrointestinal bleed)     Hyperlipidemia     Hypertension     PONV (postoperative nausea and vomiting)     Had complete hysterectomy 1992    Postoperative wound infection Not sure    Have had wounds to have staph infection over the years.    Rectal bleeding 01/22    One time episode. Treated in hospital.       Allergies   Allergen Reactions    Bactrim [Sulfamethoxazole-Trimethoprim] Rash    Ciprofloxacin Rash       Past Surgical History:   Procedure Laterality Date    BREAST BIOPSY Left     benign    BREAST CYST ASPIRATION  20 years ago     Benign    BREAST CYST ASPIRATION  20 years ago    Benign    BREAST CYST ASPIRATION  20 years ago    BREAST CYST ASPIRATION  20 years ago    CARDIAC CATHETERIZATION      [place]     CARDIAC CATHETERIZATION Left 2021    Procedure: Left Heart Cath-- PT c-19 locally. knows to bring results to procedure ;  Surgeon: Angel Nassar MD;  Location:  [facility];  Service: Cardiology;  Laterality: Left;    CATARACT EXTRACTION, BILATERAL      COLONOSCOPY      ENDOSCOPY      ENDOSCOPY N/A 2023    Procedure: ESOPHAGOGASTRODUODENOSCOPY WITH ANESTHESIA;  Surgeon: Donald Yoder MD;  Location:  [facility];  Service: Gastroenterology;  Laterality: N/A;    EYE SURGERY  10/2011    Cataract removed    FRACTURE SURGERY Left     LEFT ARM    HYSTERECTOMY      benign       Family History   Problem Relation Age of Onset    Heart failure Mother     Heart disease Mother     Miscarriages / Stillbirths Mother     Heart disease Father     Stroke Father     Arthritis Father     Heart disease Sister         No    Hypertension Sister     Heart attack Brother     Early death Brother         Heart disease    Heart disease Sister     Fibromyalgia Sister     Diabetes Sister         On oral medication    Arthritis Son     Arthritis Son     Heart disease Son     Hyperlipidemia Son     Arthritis Daughter     Cancer Brother         Recently diagnosed with lung cancer.    COPD Brother         Heavy smoker.    Breast cancer Neg Hx        Social History     Socioeconomic History    Marital status:    Tobacco Use    Smoking status: Former     Current packs/day: 0.00     Average packs/day: 1 pack/day for 10.0 years (10.0 ttl pk-yrs)     Types: Cigarettes     Start date: 1964     Quit date: 1974     Years since quittin.9     Passive exposure: Past    Smokeless tobacco: Never   Vaping Use    Vaping status: Never Used   Substance and Sexual Activity    Alcohol use: Never    Drug use: Never     Sexual activity: Not Currently     Partners: Male     Birth control/protection: Condom, Coitus interruptus, Hysterectomy     Comment:  only.           Objective   Physical Exam  Vitals and nursing note reviewed.   Constitutional:       General: She is not in acute distress.     Appearance: She is well-developed. She is not diaphoretic.   HENT:      Head: Normocephalic and atraumatic.      Right Ear: External ear normal.      Left Ear: External ear normal.      Nose: Nose normal.   Eyes:      Conjunctiva/sclera: Conjunctivae normal.      Pupils: Pupils are equal, round, and reactive to light.   Neck:      Vascular: No JVD.      Trachea: No tracheal deviation.   Cardiovascular:      Rate and Rhythm: Normal rate and regular rhythm.      Heart sounds: Normal heart sounds. No murmur heard.  Pulmonary:      Effort: Pulmonary effort is normal. No respiratory distress.      Breath sounds: Normal breath sounds. No wheezing.   Abdominal:      Palpations: Abdomen is soft.      Tenderness: There is no abdominal tenderness.   Musculoskeletal:         General: No deformity. Normal range of motion.      Cervical back: Normal range of motion and neck supple.   Skin:     General: Skin is warm and dry.      Coloration: Skin is not pale.      Findings: No erythema or rash.   Neurological:      Mental Status: She is alert and oriented to person, place, and time.      Cranial Nerves: No cranial nerve deficit.   Psychiatric:         Behavior: Behavior normal.         Thought Content: Thought content normal.         Procedures           ED Course                                             Medical Decision Making  73-year-old female with dysuria.  Patient does have history of recent urinary tract infections.    Problems Addressed:  UTI symptoms: acute illness or injury     Details: Patient's workup is essentially benign.  Patient does have symptoms associated with UTI.  Pro-Mark is normal.  Urine is not as concerning as physical  exam.  Secondary to physical exam patient will be started prophylactically on antibiotic.  Pyridium for pain control.  Blood cultures pending.    Amount and/or Complexity of Data Reviewed  Labs: ordered.    Risk  OTC drugs.  Prescription drug management.        Final diagnoses:   UTI symptoms       ED Disposition  ED Disposition       ED Disposition   Discharge    Condition   Stable    Comment   --               Judy Ku MD  [address] [place] [address] [phone]    Schedule an appointment as soon as possible for a visit            Medication List        New Prescriptions      cefdinir 300 MG capsule  Commonly known as: OMNICEF  Take 1 capsule by mouth 2 (Two) Times a Day.     phenazopyridine 200 MG tablet  Commonly known as: PYRIDIUM  Take 1 tablet by mouth 3 (Three) Times a Day As Needed for Bladder Spasms.               Where to Get Your Medications        These medications were sent to [facility] [address] [place] [address] - [address] [place] [address] - [phone]  - [phone]   [address] [facility] [address]      Phone: [phone]   cefdinir 300 MG capsule  phenazopyridine 200 MG tablet            Kyle Ortega II, PA  03/20/24 7914

## 2024-03-22 LAB
BACTERIA SPEC AEROBE CULT: ABNORMAL
BACTERIA SPEC AEROBE CULT: NORMAL

## 2024-05-01 ENCOUNTER — OFFICE VISIT (OUTPATIENT)
Dept: SURGERY | Facility: CLINIC | Age: 74
End: 2024-05-01
Payer: MEDICARE

## 2024-05-01 VITALS — HEIGHT: 61 IN | WEIGHT: 144 LBS | BODY MASS INDEX: 27.19 KG/M2

## 2024-05-01 DIAGNOSIS — K44.9 HIATAL HERNIA: Primary | ICD-10-CM

## 2024-05-01 PROCEDURE — 1159F MED LIST DOCD IN RCRD: CPT | Performed by: SURGERY

## 2024-05-01 PROCEDURE — 1160F RVW MEDS BY RX/DR IN RCRD: CPT | Performed by: SURGERY

## 2024-05-01 PROCEDURE — 99212 OFFICE O/P EST SF 10 MIN: CPT | Performed by: SURGERY

## 2024-05-01 NOTE — PROGRESS NOTES
Subjective   Nakia Mondragon is a 73 y.o. female is being seen for consultation today at the request of Judy Ku MD    Nakia Mondragon is a 73 y.o. female History of Present Illness  With chronic GERD and knowledge of hiatal hernia.  She has a large sliding hiatal hernia.  No dysphagia noted.  Patient's main complaint is tightness in the chest when ambulating or exercising.  This has subsided with increased to twice daily PPI therapy.  No blood per rectum.  No unintentional weight loss or hematemesis noted.  EGD was performed and showed no significant reflux change the hiatal hernia was noted.  She had some gastric evidence of healing erosions and her symptoms have greatly improved since going to twice daily Prilosec.  The patient is on Mounjaro which since discontinuation has minimized her symptomatology in severity and frequency.  Hernia  Associated symptoms include abdominal pain. Pertinent negatives include no chest pain, chills, coughing, fever, headaches, joint swelling, nausea, rash, sore throat, vomiting or weakness.       Past Medical History:   Diagnosis Date    Abnormal ECG     Acid reflux     Arthritis     Breast mass Benign cyst    C. difficile colitis     Cancer 2023    Several skin cancers. Removed by dermatologist    Colitis 01/29/2022    Colon polyp 15-20 years ago  i guess    Removed during colonoscopy by Dr Bianchi    Diabetes mellitus Borderline 20 years ago    Treated with oral meds for a few years, no longer require meds.    Elevated cholesterol     Fibrocystic breast 15-20 years ago.    Had benign cyst removed maybe 20 yrs ago.    GI (gastrointestinal bleed)     Hyperlipidemia     Hypertension     PONV (postoperative nausea and vomiting)     Had complete hysterectomy 1992    Postoperative wound infection Not sure    Have had wounds to have staph infection over the years.    Rectal bleeding 01/22    One time episode. Treated in hospital.       Family History   Problem  Relation Age of Onset    Heart failure Mother     Heart disease Mother     Miscarriages / Stillbirths Mother     Hypertension Mother     Heart disease Father     Stroke Father     Arthritis Father     Heart disease Sister         No    Hypertension Sister     Heart attack Brother     Early death Brother         Heart disease    Heart disease Sister     Fibromyalgia Sister     Diabetes Sister         On oral medication    Arthritis Son     Arthritis Son     Heart disease Son     Hyperlipidemia Son     Arthritis Daughter     Cancer Brother         Recently diagnosed with lung cancer.    COPD Brother         Heavy smoker.    Breast cancer Neg Hx        Social History     Socioeconomic History    Marital status:    Tobacco Use    Smoking status: Former     Current packs/day: 0.00     Average packs/day: 1 pack/day for 10.0 years (10.0 ttl pk-yrs)     Types: Cigarettes     Start date: 1964     Quit date: 1974     Years since quittin.0     Passive exposure: Past    Smokeless tobacco: Never   Vaping Use    Vaping status: Never Used   Substance and Sexual Activity    Alcohol use: Never    Drug use: Never    Sexual activity: Not Currently     Partners: Male     Birth control/protection: Condom, Coitus interruptus, Hysterectomy     Comment:  only.       Past Surgical History:   Procedure Laterality Date    BREAST BIOPSY Left     benign    BREAST CYST ASPIRATION  20 years ago    Benign    BREAST CYST ASPIRATION  20 years ago    Benign    BREAST CYST ASPIRATION  20 years ago    BREAST CYST ASPIRATION  20 years ago    CARDIAC CATHETERIZATION      [facility]     CARDIAC CATHETERIZATION Left 2021    Procedure: Left Heart Cath-- PT c-19 locally. knows to bring results to procedure ;  Surgeon: Angel Nassar MD;  Location: [facility] INVASIVE LOCATION;  Service: Cardiology;  Laterality: Left;    CATARACT EXTRACTION, BILATERAL      COLONOSCOPY      ENDOSCOPY      ENDOSCOPY N/A 2023     "Procedure: ESOPHAGOGASTRODUODENOSCOPY WITH ANESTHESIA;  Surgeon: Donald Yoder MD;  Location: [facility];  Service: Gastroenterology;  Laterality: N/A;    EYE SURGERY  10/2011    Cataract removed    FRACTURE SURGERY Left     LEFT ARM    HYSTERECTOMY  1992    benign       Review of Systems   Constitutional:  Negative for activity change, appetite change, chills and fever.   HENT:  Negative for sore throat and trouble swallowing.    Eyes:  Negative for visual disturbance.   Respiratory:  Negative for cough and shortness of breath.    Cardiovascular:  Negative for chest pain and palpitations.   Gastrointestinal:  Positive for abdominal pain. Negative for abdominal distention, blood in stool, constipation, diarrhea, nausea and vomiting.   Endocrine: Negative for cold intolerance and heat intolerance.   Genitourinary:  Negative for dysuria.   Musculoskeletal:  Negative for joint swelling.   Skin:  Negative for color change, rash and wound.   Allergic/Immunologic: Negative for immunocompromised state.   Neurological:  Negative for dizziness, seizures, weakness and headaches.   Hematological:  Negative for adenopathy. Does not bruise/bleed easily.   Psychiatric/Behavioral:  Negative for agitation and confusion.          Ht 154.9 cm (61\")   Wt 65.3 kg (144 lb)   BMI 27.21 kg/m²   Objective   Physical Exam  Constitutional:       Appearance: She is well-developed.   HENT:      Head: Normocephalic and atraumatic.   Eyes:      Conjunctiva/sclera: Conjunctivae normal.      Pupils: Pupils are equal, round, and reactive to light.   Neck:      Thyroid: No thyromegaly.      Vascular: No JVD.      Trachea: No tracheal deviation.   Cardiovascular:      Rate and Rhythm: Normal rate and regular rhythm.      Heart sounds: No murmur heard.     No friction rub. No gallop.   Pulmonary:      Effort: Pulmonary effort is normal.      Breath sounds: Normal breath sounds.   Abdominal:      General: There is no distension.      " Palpations: Abdomen is soft. There is no hepatomegaly or splenomegaly.      Tenderness: There is no abdominal tenderness.      Hernia: No hernia is present.   Musculoskeletal:         General: No deformity. Normal range of motion.      Cervical back: Neck supple.   Skin:     General: Skin is warm and dry.   Neurological:      Mental Status: She is alert and oriented to person, place, and time.               Assessment   Diagnoses and all orders for this visit:    1. Hiatal hernia (Primary)      Nakia Mondragon is a 73 y.o. female with large sliding hiatal hernia and symptoms of chest discomfort with ambulation that may be related to her hernia is cardiac work-up is negative.  Patient will continue twice daily PPI therapy and lifestyle and dietary modification.  Her symptoms may be exacerbated by Mounjaro given its cessation has led to minimization of her symptoms in frequency and severity.  She can resume as you are on a low-dose to see if symptoms worsen and if they do worsen we may pursue hiatal hernia repair and discontinuation of Mounjaro.  Otherwise continue current management.  Follow-up in 2 months.

## 2024-07-10 ENCOUNTER — OFFICE VISIT (OUTPATIENT)
Dept: SURGERY | Facility: CLINIC | Age: 74
End: 2024-07-10
Payer: MEDICARE

## 2024-07-10 VITALS — BODY MASS INDEX: 27.19 KG/M2 | WEIGHT: 144 LBS | HEIGHT: 61 IN

## 2024-07-10 DIAGNOSIS — K44.9 HIATAL HERNIA: Primary | ICD-10-CM

## 2024-07-10 PROCEDURE — 1159F MED LIST DOCD IN RCRD: CPT | Performed by: SURGERY

## 2024-07-10 PROCEDURE — 1160F RVW MEDS BY RX/DR IN RCRD: CPT | Performed by: SURGERY

## 2024-07-10 PROCEDURE — 99212 OFFICE O/P EST SF 10 MIN: CPT | Performed by: SURGERY

## 2024-07-10 NOTE — PROGRESS NOTES
Subjective   Nakia Mondragon is a 74 y.o. female is being seen for consultation today at the request of Judy Ku MD    Nakia Mondragon is a 74 y.o. female History of Present Illness  With chronic GERD and knowledge of hiatal hernia.  She has a large sliding hiatal hernia.  No dysphagia noted.  Patient's main complaint is tightness in the chest when ambulating or exercising.  This has subsided with increased to twice daily PPI therapy.  No blood per rectum.  No unintentional weight loss or hematemesis noted.  EGD was performed and showed no significant reflux change the hiatal hernia was noted.  She had some gastric evidence of healing erosions and her symptoms have greatly improved since going to twice daily Prilosec.  The patient is on Mounjaro which since discontinuation has minimized her symptomatology in severity and frequency.  She has resumed Mounjaro and her symptomatology has not worsened.  Hernia  Associated symptoms include abdominal pain. Pertinent negatives include no chest pain, chills, coughing, fever, headaches, joint swelling, nausea, rash, sore throat, vomiting or weakness.   Follow-upCurrent symptoms: abdominal pain. Current symptoms include no chest pain, no confusion, no dizziness, no nausea, no palpitations, no shortness of breath, no weakness, no headaches, no cough and no sore throat.       Past Medical History:   Diagnosis Date    Abnormal ECG     Acid reflux     Arthritis     Breast mass Benign cyst    C. difficile colitis     Cancer 2023    Several skin cancers. Removed by dermatologist    Colitis 01/29/2022    Colon polyp 15-20 years ago  i guess    Removed during colonoscopy by Dr Bianchi    Diabetes mellitus Borderline 20 years ago    Treated with oral meds for a few years, no longer require meds.    Elevated cholesterol     Fibrocystic breast 15-20 years ago.    Had benign cyst removed maybe 20 yrs ago.    GI (gastrointestinal bleed)     Hyperlipidemia      Hypertension     PONV (postoperative nausea and vomiting)     Had complete hysterectomy     Postoperative wound infection Not sure    Have had wounds to have staph infection over the years.    Rectal bleeding     One time episode. Treated in hospital.       Family History   Problem Relation Age of Onset    Heart failure Mother     Heart disease Mother     Miscarriages / Stillbirths Mother     Hypertension Mother     Heart disease Father     Stroke Father     Arthritis Father     Heart disease Sister         No    Hypertension Sister     Heart attack Brother     Early death Brother         Heart disease    Heart disease Sister     Fibromyalgia Sister     Diabetes Sister         On oral medication    Arthritis Son     Arthritis Son     Heart disease Son     Hyperlipidemia Son     Arthritis Daughter     Cancer Brother         Recently diagnosed with lung cancer.    COPD Brother         Heavy smoker.    Breast cancer Neg Hx        Social History     Socioeconomic History    Marital status:    Tobacco Use    Smoking status: Former     Current packs/day: 0.00     Average packs/day: 1 pack/day for 10.0 years (10.0 ttl pk-yrs)     Types: Cigarettes     Start date: 1964     Quit date: 1974     Years since quittin.2     Passive exposure: Past    Smokeless tobacco: Never   Vaping Use    Vaping status: Never Used   Substance and Sexual Activity    Alcohol use: Never    Drug use: Never    Sexual activity: Not Currently     Partners: Male     Birth control/protection: Condom, Coitus interruptus, Hysterectomy     Comment:  only.       Past Surgical History:   Procedure Laterality Date    BREAST BIOPSY Left     benign    BREAST CYST ASPIRATION  20 years ago    Benign    BREAST CYST ASPIRATION  20 years ago    Benign    BREAST CYST ASPIRATION  20 years ago    BREAST CYST ASPIRATION  20 years ago    CARDIAC CATHETERIZATION      [facility]     CARDIAC CATHETERIZATION Left 2021    Procedure:  "Left Heart Cath-- PT c-19 locally. knows to bring results to procedure 06/07;  Surgeon: Angel Nassar MD;  Location:  [facility] INVAS[facility];  Service: Cardiology;  Laterality: Left;    CATARACT EXTRACTION, BILATERAL      COLONOSCOPY      ENDOSCOPY      ENDOSCOPY N/A 12/29/2023    Procedure: ESOPHAGOGASTRODUODENOSCOPY WITH ANESTHESIA;  Surgeon: Donald Yoder MD;  Location:  [facility];  Service: Gastroenterology;  Laterality: N/A;    EYE SURGERY  10/2011    Cataract removed    FRACTURE SURGERY Left     LEFT ARM    HYSTERECTOMY  1992    benign       Review of Systems   Constitutional:  Negative for activity change, appetite change, chills and fever.   HENT:  Negative for sore throat and trouble swallowing.    Eyes:  Negative for visual disturbance.   Respiratory:  Negative for cough and shortness of breath.    Cardiovascular:  Negative for chest pain and palpitations.   Gastrointestinal:  Positive for abdominal pain. Negative for abdominal distention, blood in stool, constipation, diarrhea, nausea and vomiting.   Endocrine: Negative for cold intolerance and heat intolerance.   Genitourinary:  Negative for dysuria.   Musculoskeletal:  Negative for joint swelling.   Skin:  Negative for color change, rash and wound.   Allergic/Immunologic: Negative for immunocompromised state.   Neurological:  Negative for dizziness, seizures, weakness and headaches.   Hematological:  Negative for adenopathy. Does not bruise/bleed easily.   Psychiatric/Behavioral:  Negative for agitation and confusion.          Ht 154.9 cm (61\")   Wt 65.3 kg (144 lb)   BMI 27.21 kg/m²   Objective   Physical Exam  Constitutional:       Appearance: She is well-developed.   HENT:      Head: Normocephalic and atraumatic.   Eyes:      Conjunctiva/sclera: Conjunctivae normal.      Pupils: Pupils are equal, round, and reactive to light.   Neck:      Thyroid: No thyromegaly.      Vascular: No JVD.      Trachea: No tracheal deviation. "   Cardiovascular:      Rate and Rhythm: Normal rate and regular rhythm.      Heart sounds: No murmur heard.     No friction rub. No gallop.   Pulmonary:      Effort: Pulmonary effort is normal.      Breath sounds: Normal breath sounds.   Abdominal:      General: There is no distension.      Palpations: Abdomen is soft. There is no hepatomegaly or splenomegaly.      Tenderness: There is no abdominal tenderness.      Hernia: No hernia is present.   Musculoskeletal:         General: No deformity. Normal range of motion.      Cervical back: Neck supple.   Skin:     General: Skin is warm and dry.   Neurological:      Mental Status: She is alert and oriented to person, place, and time.               Assessment   Diagnoses and all orders for this visit:    1. Hiatal hernia (Primary)      Nakia Mondragon is a 74 y.o. female with large sliding hiatal hernia and symptoms of chest discomfort with ambulation that may be related to her hernia is cardiac work-up is negative.  Patient will continue twice daily PPI therapy and lifestyle and dietary modification.  Her symptoms may be exacerbated by Mounjaro given its cessation has led to minimization of her symptoms in frequency and severity.  Patient has resumed Mounjaro without worsening of symptoms.  No need for hiatal hernia repair at this time given her asymptomatic nature with medical management.  Follow-up as needed.

## 2024-07-26 ENCOUNTER — TRANSCRIBE ORDERS (OUTPATIENT)
Dept: ADMINISTRATIVE | Facility: HOSPITAL | Age: 74
End: 2024-07-26
Payer: MEDICARE

## 2024-07-26 DIAGNOSIS — I73.9 PERIPHERAL VASCULAR DISEASE, UNSPECIFIED: Primary | ICD-10-CM

## 2024-08-08 ENCOUNTER — HOSPITAL ENCOUNTER (OUTPATIENT)
Facility: HOSPITAL | Age: 74
Discharge: HOME | End: 2024-08-08
Admitting: INTERNAL MEDICINE
Payer: MEDICARE

## 2024-08-08 DIAGNOSIS — I73.9 PERIPHERAL VASCULAR DISEASE, UNSPECIFIED: ICD-10-CM

## 2024-08-08 PROCEDURE — 93925 LOWER EXTREMITY STUDY: CPT

## 2024-09-03 ENCOUNTER — TRANSCRIBE ORDERS (OUTPATIENT)
Dept: ADMINISTRATIVE | Facility: HOSPITAL | Age: 74
End: 2024-09-03
Payer: MEDICARE

## 2024-09-03 DIAGNOSIS — Z12.31 BREAST CANCER SCREENING BY MAMMOGRAM: Primary | ICD-10-CM

## 2024-09-09 ENCOUNTER — HOSPITAL ENCOUNTER (OUTPATIENT)
Facility: HOSPITAL | Age: 74
Discharge: HOME | End: 2024-09-09
Admitting: INTERNAL MEDICINE
Payer: MEDICARE

## 2024-09-09 DIAGNOSIS — Z12.31 BREAST CANCER SCREENING BY MAMMOGRAM: ICD-10-CM

## 2024-09-09 PROCEDURE — 77067 SCR MAMMO BI INCL CAD: CPT | Performed by: RADIOLOGY

## 2024-09-09 PROCEDURE — 77063 BREAST TOMOSYNTHESIS BI: CPT | Performed by: RADIOLOGY

## 2024-09-09 PROCEDURE — 77063 BREAST TOMOSYNTHESIS BI: CPT

## 2024-09-09 PROCEDURE — 77067 SCR MAMMO BI INCL CAD: CPT

## 2025-01-28 NOTE — PROGRESS NOTES
Subjective:     Encounter Date:02/04/2025      Patient ID: Nakia Mondragon is a 74 y.o.  white female from [place], [place].    PCP: Judy Ku MD     Chief Complaint:   Chief Complaint   Patient presents with    Coronary artery disease involving native coronary artery of     Problem List:  Coronary artery disease  Normal coronaries and LV by cath 2005  [facility] for abnormal exercise study 2011 (ST depression with normal perfusion): Normal LV function, minor nonobstructive CAD   Stress MPS 01/2020: EF >70%. No evidence of ischemia. Low risk study.    Echo, 01/13/2020; EF 61-65%. Grade I diastolic dysfunction with impaired relaxation. Mild MR and TR. RVSP from TR is 35-45 mmHg. Mild pulmonary hypertension.   Symptoms of exertional angina Spring 2021  Stress MPS, 05/04/2021; EF 69%. No evidence of ischemia. Low risk study. TID 1.14.  [facility], 06/07/2021; Minor nonobstructive coronary artery disease is noted. Closure the right common femoral artery.   Left ventriculography, 06/07/2021; Normal.   Bilateral selective coronary angiography, 06/07/2021; LM: Normal. LAD: This vessel gives rise to a moderate-sized first diagonal artery proximally. Distal to the origin of this diagonal artery the LAD is a very small vessel that contains no angiographically demonstrable focal obstructive disease. LCX: This is a small nondominant vessel that gives rise to a small first and a larger second marginal artery and posterior left ventricular wall.  The proximal/mid circumflex contains minor nonobstructive plaque. RCA: This is a large dominant vessel that gives rise to posterior descending and posterolateral branches. Nonobstructive mid segment plaque, minor, is noted.   Hypertension   Hyperlipidemia  DM2   Family history of premature CAD   GERD  Covid 19 infection October 2020    Allergies   Allergen Reactions    Bactrim [Sulfamethoxazole-Trimethoprim] Rash    Ciprofloxacin Rash       Current Outpatient Medications    Medication Instructions    aspirin 81 mg, Daily    celecoxib (CELEBREX) 200 mg, Daily    coenzyme Q10 100 mg, Daily    isosorbide mononitrate (IMDUR) 60 mg, Nightly    levocetirizine (XYZAL) 5 mg, Every Evening    lisinopril-hydrochlorothiazide (PRINZIDE,ZESTORETIC) 20-12.5 MG per tablet 1 tablet, Oral, Daily    metoprolol succinate XL (TOPROL-XL) 25 mg, Nightly    multivitamin with minerals (CENTRUM SILVER 50+WOMEN PO) 1 tablet, Daily    Myrbetriq 25 MG tablet sustained-release 24 hour 24 hr tablet 1 tablet, Daily    nitroglycerin (NITROSTAT) 0.4 MG SL tablet 1 under the tongue as needed for angina, may repeat q5mins for up three doses    omeprazole (PRILOSEC) 40 mg, 2 Times Daily    rOPINIRole (REQUIP) 0.5 mg, Daily    rosuvastatin (CRESTOR) 40 mg, Daily         HISTORY OF PRESENT ILLNESS:  The patient is here for a 1 year follow-up.  Patient denies any hospitalizations or surgeries.  She has a large sliding hiatal hernia.  She saw a general surgeon regarding this and they decided to not do surgery right now because after she increased her Prilosec her symptoms got a little better.  The patient occasionally will have some chest heaviness on both sides of her chest which go down into her arms.  She has noticed this on exertion but then if she stops for a couple minutes it will go away.  She denies any shortness of breath, dizziness, presyncope, or syncope.  Occasionally she will feel a heart palpitation for a second or 2 but these episodes are very infrequent.  The patient has used 5 nitroglycerin sublingual over the past year.  She had an episode around [place] when her blood pressure was elevated for 1 day and she took a nitroglycerin sublingual during that time.  Since then her blood pressure has been normal.  She attributes this to eating more salt around the holidays.      ROS   All other systems reviewed and otherwise negative.    Procedures       Objective:       Vitals:    02/04/25 0951   BP: 114/78  "  BP Location: Left arm   Patient Position: Sitting   Pulse: 85   SpO2: 98%   Weight: 68 kg (150 lb)   Height: 154.9 cm (61\")     Body mass index is 28.34 kg/m².  Wt Readings from Last 2 Encounters:   02/04/25 68 kg (150 lb)   07/10/24 65.3 kg (144 lb)        Constitutional:       Appearance: Healthy appearance. Not in distress.   Neck:      Vascular: No JVR. JVD normal.   Pulmonary:      Effort: Pulmonary effort is normal.      Breath sounds: Normal breath sounds. No wheezing. No rhonchi. No rales.   Chest:      Chest wall: Not tender to palpatation.   Cardiovascular:      PMI at left midclavicular line. Normal rate. Regular rhythm. Normal S1. Normal S2.       Murmurs: There is no murmur.      No gallop.  No click. No rub.   Pulses:     Intact distal pulses.   Edema:     Peripheral edema absent.   Abdominal:      General: Bowel sounds are normal.      Palpations: Abdomen is soft.      Tenderness: There is no abdominal tenderness.   Musculoskeletal: Normal range of motion.         General: No tenderness. Skin:     General: Skin is warm and dry.   Neurological:      General: No focal deficit present.      Mental Status: Alert and oriented to person, place and time.           Lab Review:   Lab Results   Component Value Date    GLUCOSE 107 (H) 03/19/2024    BUN 20 03/19/2024    CREATININE 1.01 (H) 03/19/2024    EGFRIFNONA 87 02/01/2022    BCR 19.8 03/19/2024    CO2 24.3 03/19/2024    CALCIUM 9.3 03/19/2024    ALBUMIN 4.1 03/19/2024    AST 25 03/19/2024    ALT 21 03/19/2024       Lab Results   Component Value Date    WBC 8.62 03/19/2024    HGB 12.5 03/19/2024    HCT 38.9 03/19/2024    MCV 93.7 03/19/2024     03/19/2024       Lab Results   Component Value Date    HGBA1C 5.80 (H) 01/29/2022       Lab Results   Component Value Date    TSH 1.960 01/13/2020       Lab Results   Component Value Date    CHOL 110 06/07/2021     Lab Results   Component Value Date    TRIG 101 06/07/2021     Lab Results   Component Value " Date    HDL 40 06/07/2021     Lab Results   Component Value Date    LDL 51 06/07/2021             Results for orders placed during the hospital encounter of 01/13/20    Adult Transthoracic Echo Complete W/ Cont if Necessary Per Protocol    Interpretation Summary  · Normal left ventricular cavity size and wall thickness noted. All left ventricular wall segments contract normally.  · Estimated EF appears to be in the range of 61 - 65%.  · Left ventricular diastolic dysfunction (grade I) consistent with impaired relaxation.  · The aortic valve is structurally normal. No aortic valve regurgitation is present. No aortic valve stenosis is present.  · The mitral valve is normal in structure. Mild mitral valve regurgitation is present. No significant mitral valve stenosis is present.  · The tricuspid valve is normal. Mild tricuspid valve regurgitation is present. Estimated right ventricular systolic pressure from tricuspid regurgitation is mildly elevated (35-45 mmHg).  · Mild pulmonary hypertension is present.  · The pericardium is normal. There is no evidence of pericardial effusion.                Assessment:    Patient with chest heaviness with radiation to bilateral upper extremities so I will order a stress test to rule out focal obstructive CAD and an echocardiogram to assess heart structure/function.  Hopefully I will get to review the patient's next laboratory testing results.         Diagnosis Plan   1. Dyspnea on exertion  Stress Test With Myocardial Perfusion (1 Day)    Adult Transthoracic Echo Complete W/ Cont if Necessary Per Protocol      2. Essential hypertension  Controlled, continue current cardiac medications      3. Mixed hyperlipidemia  No new labs to review, continue rosuvastatin      4. Chest heaviness  Stress Test With Myocardial Perfusion (1 Day)    Adult Transthoracic Echo Complete W/ Cont if Necessary Per Protocol             Plan:         Patient to continue current medications and close follow  up with the above providers.  Tentative cardiology follow up in 1 year or patient may return sooner PRN.  Stress test  Echocardiogram      Electronically signed by CHON Armijo, 02/04/25, 12:34 PM EST.

## 2025-02-04 ENCOUNTER — OFFICE VISIT (OUTPATIENT)
Age: 75
End: 2025-02-04
Payer: MEDICARE

## 2025-02-04 VITALS
HEART RATE: 85 BPM | BODY MASS INDEX: 28.32 KG/M2 | WEIGHT: 150 LBS | OXYGEN SATURATION: 98 % | SYSTOLIC BLOOD PRESSURE: 114 MMHG | HEIGHT: 61 IN | DIASTOLIC BLOOD PRESSURE: 78 MMHG

## 2025-02-04 DIAGNOSIS — I10 ESSENTIAL HYPERTENSION: ICD-10-CM

## 2025-02-04 DIAGNOSIS — R06.09 DYSPNEA ON EXERTION: Primary | ICD-10-CM

## 2025-02-04 DIAGNOSIS — R07.89 CHEST HEAVINESS: ICD-10-CM

## 2025-02-04 DIAGNOSIS — E78.2 MIXED HYPERLIPIDEMIA: ICD-10-CM

## 2025-02-04 RX ORDER — ROPINIROLE 0.5 MG/1
0.5 TABLET, FILM COATED ORAL DAILY
COMMUNITY
Start: 2025-01-03

## 2025-02-04 RX ORDER — MIRABEGRON 25 MG/1
1 TABLET, FILM COATED, EXTENDED RELEASE ORAL DAILY
COMMUNITY
Start: 2025-01-14

## 2025-02-24 ENCOUNTER — HOSPITAL ENCOUNTER (OUTPATIENT)
Dept: CARDIOLOGY | Facility: HOSPITAL | Age: 75
Discharge: HOME | End: 2025-02-24
Payer: MEDICARE

## 2025-02-24 VITALS
BODY MASS INDEX: 28.32 KG/M2 | WEIGHT: 150 LBS | DIASTOLIC BLOOD PRESSURE: 75 MMHG | SYSTOLIC BLOOD PRESSURE: 132 MMHG | HEIGHT: 61 IN

## 2025-02-24 VITALS — HEIGHT: 61 IN | WEIGHT: 149.91 LBS | BODY MASS INDEX: 28.3 KG/M2

## 2025-02-24 DIAGNOSIS — R07.89 CHEST HEAVINESS: ICD-10-CM

## 2025-02-24 DIAGNOSIS — R06.09 DYSPNEA ON EXERTION: ICD-10-CM

## 2025-02-24 LAB
ASCENDING AORTA: 2.9 CM
AV MEAN PRESS GRAD SYS DOP V1V2: 2 MMHG
AV VMAX SYS DOP: 98.2 CM/SEC
BH CV ECHO MEAS - AO MAX PG: 3.9 MMHG
BH CV ECHO MEAS - AO ROOT DIAM: 2.7 CM
BH CV ECHO MEAS - AO V2 VTI: 21 CM
BH CV ECHO MEAS - AVA(I,D): 2.34 CM2
BH CV ECHO MEAS - EDV(CUBED): 68.9 ML
BH CV ECHO MEAS - EDV(MOD-SP2): 49.7 ML
BH CV ECHO MEAS - EDV(MOD-SP4): 67.3 ML
BH CV ECHO MEAS - EF(MOD-SP2): 59 %
BH CV ECHO MEAS - EF(MOD-SP4): 54.8 %
BH CV ECHO MEAS - ESV(CUBED): 11.7 ML
BH CV ECHO MEAS - ESV(MOD-SP2): 20.4 ML
BH CV ECHO MEAS - ESV(MOD-SP4): 30.4 ML
BH CV ECHO MEAS - FS: 44.6 %
BH CV ECHO MEAS - IVS/LVPW: 1 CM
BH CV ECHO MEAS - IVSD: 1 CM
BH CV ECHO MEAS - LA DIMENSION: 3.6 CM
BH CV ECHO MEAS - LAT PEAK E' VEL: 5 CM/SEC
BH CV ECHO MEAS - LV DIASTOLIC VOL/BSA (35-75): 40.3 CM2
BH CV ECHO MEAS - LV MASS(C)D: 132.1 GRAMS
BH CV ECHO MEAS - LV MAX PG: 2.8 MMHG
BH CV ECHO MEAS - LV MEAN PG: 1 MMHG
BH CV ECHO MEAS - LV SYSTOLIC VOL/BSA (12-30): 18.2 CM2
BH CV ECHO MEAS - LV V1 MAX: 83.3 CM/SEC
BH CV ECHO MEAS - LV V1 VTI: 15.7 CM
BH CV ECHO MEAS - LVIDD: 4.1 CM
BH CV ECHO MEAS - LVIDS: 2.27 CM
BH CV ECHO MEAS - LVOT AREA: 3.1 CM2
BH CV ECHO MEAS - LVOT DIAM: 2 CM
BH CV ECHO MEAS - LVPWD: 1 CM
BH CV ECHO MEAS - MED PEAK E' VEL: 4.7 CM/SEC
BH CV ECHO MEAS - MV A MAX VEL: 113 CM/SEC
BH CV ECHO MEAS - MV DEC SLOPE: 439.9 CM/SEC2
BH CV ECHO MEAS - MV DEC TIME: 0.13 SEC
BH CV ECHO MEAS - MV E MAX VEL: 58.5 CM/SEC
BH CV ECHO MEAS - MV E/A: 0.52
BH CV ECHO MEAS - MV MAX PG: 4.9 MMHG
BH CV ECHO MEAS - MV MEAN PG: 1.67 MMHG
BH CV ECHO MEAS - MV P1/2T: 41.1 MSEC
BH CV ECHO MEAS - MV V2 VTI: 19.1 CM
BH CV ECHO MEAS - MVA(P1/2T): 5.3 CM2
BH CV ECHO MEAS - MVA(VTI): 2.6 CM2
BH CV ECHO MEAS - PA ACC TIME: 0.14 SEC
BH CV ECHO MEAS - PA V2 MAX: 65.1 CM/SEC
BH CV ECHO MEAS - RAP SYSTOLE: 3 MMHG
BH CV ECHO MEAS - RVSP: 22 MMHG
BH CV ECHO MEAS - SV(LVOT): 49.3 ML
BH CV ECHO MEAS - SV(MOD-SP2): 29.3 ML
BH CV ECHO MEAS - SV(MOD-SP4): 36.9 ML
BH CV ECHO MEAS - SVI(LVOT): 29.5 ML/M2
BH CV ECHO MEAS - SVI(MOD-SP2): 17.5 ML/M2
BH CV ECHO MEAS - SVI(MOD-SP4): 22.1 ML/M2
BH CV ECHO MEAS - TAPSE (>1.6): 2.26 CM
BH CV ECHO MEAS - TR MAX PG: 19 MMHG
BH CV ECHO MEAS - TR MAX VEL: 217.2 CM/SEC
BH CV ECHO MEASUREMENTS AVERAGE E/E' RATIO: 12.06
BH CV REST NUCLEAR ISOTOPE DOSE: 9.5 MCI
BH CV STRESS BP STAGE 2: NORMAL
BH CV STRESS BP STAGE 4: NORMAL
BH CV STRESS COMMENTS STAGE 1: NORMAL
BH CV STRESS DOSE REGADENOSON STAGE 1: 0.4
BH CV STRESS DURATION MIN STAGE 1: 1
BH CV STRESS DURATION MIN STAGE 2: 1
BH CV STRESS DURATION MIN STAGE 3: 1
BH CV STRESS DURATION MIN STAGE 4: 1
BH CV STRESS DURATION SEC STAGE 1: 0
BH CV STRESS DURATION SEC STAGE 2: 0
BH CV STRESS DURATION SEC STAGE 3: 0
BH CV STRESS DURATION SEC STAGE 4: 0
BH CV STRESS HR STAGE 1: 74
BH CV STRESS HR STAGE 2: 100
BH CV STRESS HR STAGE 3: 100
BH CV STRESS HR STAGE 4: 94
BH CV STRESS NUCLEAR ISOTOPE DOSE: 32.5 MCI
BH CV STRESS O2 STAGE 1: 97
BH CV STRESS O2 STAGE 2: 96
BH CV STRESS O2 STAGE 3: 99
BH CV STRESS O2 STAGE 4: 98
BH CV STRESS PROTOCOL 1: NORMAL
BH CV STRESS RECOVERY BP: NORMAL MMHG
BH CV STRESS RECOVERY HR: 86 BPM
BH CV STRESS RECOVERY O2: 98 %
BH CV STRESS STAGE 1: 1
BH CV STRESS STAGE 2: 2
BH CV STRESS STAGE 3: 3
BH CV STRESS STAGE 4: 4
BH CV VAS BP RIGHT ARM: NORMAL MMHG
BH CV XLRA - RV BASE: 3.3 CM
BH CV XLRA - RV LENGTH: 6.3 CM
BH CV XLRA - RV MID: 2.22 CM
BH CV XLRA - TDI S': 14.3 CM/SEC
LEFT ATRIUM VOLUME INDEX: 15.4 ML/M2
LV EF BIPLANE MOD: 57.1 %
MAXIMAL PREDICTED HEART RATE: 145 BPM
PERCENT MAX PREDICTED HR: 69.66 %
SPECT HRT GATED+EF W RNC IV: 55 %
STRESS BASELINE BP: NORMAL MMHG
STRESS BASELINE HR: 75 BPM
STRESS O2 SAT REST: 96 %
STRESS PERCENT HR: 82 %
STRESS POST ESTIMATED WORKLOAD: 1 METS
STRESS POST EXERCISE DUR MIN: 4 MIN
STRESS POST EXERCISE DUR SEC: 0 SEC
STRESS POST O2 SAT PEAK: 99 %
STRESS POST PEAK BP: NORMAL MMHG
STRESS POST PEAK HR: 101 BPM
STRESS TARGET HR: 123 BPM

## 2025-02-24 PROCEDURE — 93017 CV STRESS TEST TRACING ONLY: CPT

## 2025-02-24 PROCEDURE — 25010000002 REGADENOSON 0.4 MG/5ML SOLUTION: Performed by: NURSE PRACTITIONER

## 2025-02-24 PROCEDURE — 93306 TTE W/DOPPLER COMPLETE: CPT

## 2025-02-24 PROCEDURE — 78452 HT MUSCLE IMAGE SPECT MULT: CPT

## 2025-02-24 PROCEDURE — A9500 TC99M SESTAMIBI: HCPCS | Performed by: NURSE PRACTITIONER

## 2025-02-24 PROCEDURE — 93306 TTE W/DOPPLER COMPLETE: CPT | Performed by: INTERNAL MEDICINE

## 2025-02-24 PROCEDURE — 34310000005 TECHNETIUM SESTAMIBI: Performed by: NURSE PRACTITIONER

## 2025-02-24 PROCEDURE — 93016 CV STRESS TEST SUPVJ ONLY: CPT | Performed by: INTERNAL MEDICINE

## 2025-02-24 PROCEDURE — 93018 CV STRESS TEST I&R ONLY: CPT | Performed by: INTERNAL MEDICINE

## 2025-02-24 PROCEDURE — 78452 HT MUSCLE IMAGE SPECT MULT: CPT | Performed by: INTERNAL MEDICINE

## 2025-02-24 RX ORDER — REGADENOSON 0.08 MG/ML
0.4 INJECTION, SOLUTION INTRAVENOUS ONCE
Status: COMPLETED | OUTPATIENT
Start: 2025-02-24 | End: 2025-02-24

## 2025-02-24 RX ADMIN — REGADENOSON 0.4 MG: 0.08 INJECTION, SOLUTION INTRAVENOUS at 09:39

## 2025-02-24 RX ADMIN — TECHNETIUM TC 99M SESTAMIBI 1 DOSE: 1 INJECTION INTRAVENOUS at 08:15

## 2025-02-24 RX ADMIN — TECHNETIUM TC 99M SESTAMIBI 1 DOSE: 1 INJECTION INTRAVENOUS at 09:40

## 2025-07-05 ENCOUNTER — DOCUMENTATION (OUTPATIENT)
Dept: FAMILY MEDICINE CLINIC | Facility: CLINIC | Age: 75
End: 2025-07-05
Payer: MEDICARE

## 2025-07-05 RX ORDER — CEFDINIR 300 MG/1
300 CAPSULE ORAL 2 TIMES DAILY
Qty: 20 CAPSULE | Refills: 0 | Status: SHIPPED | OUTPATIENT
Start: 2025-07-05

## 2025-07-14 ENCOUNTER — TRANSCRIBE ORDERS (OUTPATIENT)
Dept: ADMINISTRATIVE | Facility: HOSPITAL | Age: 75
End: 2025-07-14
Payer: MEDICARE

## 2025-07-14 DIAGNOSIS — R41.3 MEMORY LOSS: Primary | ICD-10-CM

## 2025-07-28 ENCOUNTER — HOSPITAL ENCOUNTER (OUTPATIENT)
Dept: CT IMAGING | Facility: HOSPITAL | Age: 75
Discharge: HOME | End: 2025-07-28
Admitting: INTERNAL MEDICINE
Payer: MEDICARE

## 2025-07-28 DIAGNOSIS — R41.3 MEMORY LOSS: ICD-10-CM

## 2025-07-28 PROCEDURE — 70450 CT HEAD/BRAIN W/O DYE: CPT

## 2025-07-29 PROCEDURE — 70450 CT HEAD/BRAIN W/O DYE: CPT | Performed by: RADIOLOGY

## 2025-08-04 ENCOUNTER — DOCUMENTATION (OUTPATIENT)
Dept: FAMILY MEDICINE CLINIC | Facility: CLINIC | Age: 75
End: 2025-08-04
Payer: MEDICARE

## 2025-08-04 RX ORDER — NITROFURANTOIN 25; 75 MG/1; MG/1
100 CAPSULE ORAL 2 TIMES DAILY
Qty: 14 CAPSULE | Refills: 0 | Status: SHIPPED | OUTPATIENT
Start: 2025-08-04 | End: 2025-08-07 | Stop reason: HOSPADM

## 2025-08-05 ENCOUNTER — APPOINTMENT (OUTPATIENT)
Dept: GENERAL RADIOLOGY | Facility: HOSPITAL | Age: 75
DRG: 872 | End: 2025-08-05
Payer: MEDICARE

## 2025-08-05 ENCOUNTER — HOSPITAL ENCOUNTER (INPATIENT)
Facility: HOSPITAL | Age: 75
LOS: 1 days | Discharge: HOME | DRG: 872 | End: 2025-08-07
Attending: EMERGENCY MEDICINE | Admitting: INTERNAL MEDICINE
Payer: MEDICARE

## 2025-08-05 ENCOUNTER — APPOINTMENT (OUTPATIENT)
Dept: CT IMAGING | Facility: HOSPITAL | Age: 75
DRG: 872 | End: 2025-08-05
Payer: MEDICARE

## 2025-08-05 DIAGNOSIS — A41.9 SEPSIS WITHOUT ACUTE ORGAN DYSFUNCTION, DUE TO UNSPECIFIED ORGANISM: ICD-10-CM

## 2025-08-05 DIAGNOSIS — N12 PYELONEPHRITIS: Primary | ICD-10-CM

## 2025-08-05 LAB
ALBUMIN SERPL-MCNC: 4.2 G/DL (ref 3.5–5.2)
ALBUMIN/GLOB SERPL: 1.6 G/DL
ALP SERPL-CCNC: 107 U/L (ref 39–117)
ALT SERPL W P-5'-P-CCNC: 57 U/L (ref 1–33)
ANION GAP SERPL CALCULATED.3IONS-SCNC: 14.5 MMOL/L (ref 5–15)
AST SERPL-CCNC: 61 U/L (ref 1–32)
BACTERIA UR QL AUTO: ABNORMAL /HPF
BASOPHILS # BLD AUTO: 0.02 10*3/MM3 (ref 0–0.2)
BASOPHILS NFR BLD AUTO: 0.2 % (ref 0–1.5)
BILIRUB SERPL-MCNC: 0.3 MG/DL (ref 0–1.2)
BUN SERPL-MCNC: 22.2 MG/DL (ref 8–23)
BUN/CREAT SERPL: 23.1 (ref 7–25)
CALCIUM SPEC-SCNC: 9.1 MG/DL (ref 8.6–10.5)
CHLORIDE SERPL-SCNC: 101 MMOL/L (ref 98–107)
CO2 SERPL-SCNC: 22.5 MMOL/L (ref 22–29)
CREAT SERPL-MCNC: 0.96 MG/DL (ref 0.57–1)
CRP SERPL-MCNC: 5.79 MG/DL (ref 0–0.5)
D-LACTATE SERPL-SCNC: 1.7 MMOL/L (ref 0.5–2)
DEPRECATED RDW RBC AUTO: 44.7 FL (ref 37–54)
EGFRCR SERPLBLD CKD-EPI 2021: 61.4 ML/MIN/1.73
EOSINOPHIL # BLD AUTO: 0.36 10*3/MM3 (ref 0–0.4)
EOSINOPHIL NFR BLD AUTO: 4.2 % (ref 0.3–6.2)
ERYTHROCYTE [DISTWIDTH] IN BLOOD BY AUTOMATED COUNT: 13.5 % (ref 12.3–15.4)
FLUAV RNA RESP QL NAA+PROBE: NOT DETECTED
FLUBV RNA RESP QL NAA+PROBE: NOT DETECTED
GLOBULIN UR ELPH-MCNC: 2.7 GM/DL
GLUCOSE SERPL-MCNC: 102 MG/DL (ref 65–99)
HCT VFR BLD AUTO: 41.9 % (ref 34–46.6)
HGB BLD-MCNC: 13.9 G/DL (ref 12–15.9)
HOLD SPECIMEN: NORMAL
HOLD SPECIMEN: NORMAL
HYALINE CASTS UR QL AUTO: ABNORMAL /LPF
IMM GRANULOCYTES # BLD AUTO: 0.02 10*3/MM3 (ref 0–0.05)
IMM GRANULOCYTES NFR BLD AUTO: 0.2 % (ref 0–0.5)
LYMPHOCYTES # BLD AUTO: 0.29 10*3/MM3 (ref 0.7–3.1)
LYMPHOCYTES NFR BLD AUTO: 3.4 % (ref 19.6–45.3)
MAGNESIUM SERPL-MCNC: 2 MG/DL (ref 1.6–2.4)
MCH RBC QN AUTO: 30 PG (ref 26.6–33)
MCHC RBC AUTO-ENTMCNC: 33.2 G/DL (ref 31.5–35.7)
MCV RBC AUTO: 90.5 FL (ref 79–97)
MONOCYTES # BLD AUTO: 0.4 10*3/MM3 (ref 0.1–0.9)
MONOCYTES NFR BLD AUTO: 4.7 % (ref 5–12)
NEUTROPHILS NFR BLD AUTO: 7.51 10*3/MM3 (ref 1.7–7)
NEUTROPHILS NFR BLD AUTO: 87.3 % (ref 42.7–76)
NRBC BLD AUTO-RTO: 0 /100 WBC (ref 0–0.2)
NT-PROBNP SERPL-MCNC: 54.6 PG/ML (ref 0–1800)
PLATELET # BLD AUTO: 187 10*3/MM3 (ref 140–450)
PMV BLD AUTO: 9.2 FL (ref 6–12)
POTASSIUM SERPL-SCNC: 3.9 MMOL/L (ref 3.5–5.2)
PROCALCITONIN SERPL-MCNC: 0.08 NG/ML (ref 0–0.25)
PROT SERPL-MCNC: 6.9 G/DL (ref 6–8.5)
RBC # BLD AUTO: 4.63 10*6/MM3 (ref 3.77–5.28)
RBC # UR STRIP: ABNORMAL /HPF
REF LAB TEST METHOD: ABNORMAL
SARS-COV-2 RNA RESP QL NAA+PROBE: NOT DETECTED
SODIUM SERPL-SCNC: 138 MMOL/L (ref 136–145)
SQUAMOUS #/AREA URNS HPF: ABNORMAL /HPF
TROPONIN T SERPL HS-MCNC: 7 NG/L
TSH SERPL DL<=0.05 MIU/L-ACNC: 0.71 UIU/ML (ref 0.27–4.2)
WBC # UR STRIP: ABNORMAL /HPF
WBC NRBC COR # BLD AUTO: 8.6 10*3/MM3 (ref 3.4–10.8)
WHOLE BLOOD HOLD COAG: NORMAL
WHOLE BLOOD HOLD SPECIMEN: NORMAL
YEAST URNS QL MICRO: ABNORMAL /HPF

## 2025-08-05 PROCEDURE — 93005 ELECTROCARDIOGRAM TRACING: CPT | Performed by: EMERGENCY MEDICINE

## 2025-08-05 PROCEDURE — 83880 ASSAY OF NATRIURETIC PEPTIDE: CPT | Performed by: EMERGENCY MEDICINE

## 2025-08-05 PROCEDURE — 84484 ASSAY OF TROPONIN QUANT: CPT | Performed by: EMERGENCY MEDICINE

## 2025-08-05 PROCEDURE — 25810000003 SODIUM CHLORIDE 0.9 % SOLUTION: Performed by: EMERGENCY MEDICINE

## 2025-08-05 PROCEDURE — 87636 SARSCOV2 & INF A&B AMP PRB: CPT | Performed by: EMERGENCY MEDICINE

## 2025-08-05 PROCEDURE — 99285 EMERGENCY DEPT VISIT HI MDM: CPT

## 2025-08-05 PROCEDURE — 83735 ASSAY OF MAGNESIUM: CPT | Performed by: EMERGENCY MEDICINE

## 2025-08-05 PROCEDURE — 86140 C-REACTIVE PROTEIN: CPT | Performed by: EMERGENCY MEDICINE

## 2025-08-05 PROCEDURE — 87040 BLOOD CULTURE FOR BACTERIA: CPT | Performed by: EMERGENCY MEDICINE

## 2025-08-05 PROCEDURE — P9612 CATHETERIZE FOR URINE SPEC: HCPCS

## 2025-08-05 PROCEDURE — 71045 X-RAY EXAM CHEST 1 VIEW: CPT

## 2025-08-05 PROCEDURE — 25010000002 CEFTRIAXONE PER 250 MG: Performed by: EMERGENCY MEDICINE

## 2025-08-05 PROCEDURE — 87086 URINE CULTURE/COLONY COUNT: CPT | Performed by: EMERGENCY MEDICINE

## 2025-08-05 PROCEDURE — 80053 COMPREHEN METABOLIC PANEL: CPT | Performed by: EMERGENCY MEDICINE

## 2025-08-05 PROCEDURE — 83605 ASSAY OF LACTIC ACID: CPT | Performed by: EMERGENCY MEDICINE

## 2025-08-05 PROCEDURE — 85025 COMPLETE CBC W/AUTO DIFF WBC: CPT | Performed by: EMERGENCY MEDICINE

## 2025-08-05 PROCEDURE — 25010000002 KETOROLAC TROMETHAMINE PER 15 MG: Performed by: EMERGENCY MEDICINE

## 2025-08-05 PROCEDURE — 81001 URINALYSIS AUTO W/SCOPE: CPT | Performed by: EMERGENCY MEDICINE

## 2025-08-05 PROCEDURE — 84145 PROCALCITONIN (PCT): CPT | Performed by: EMERGENCY MEDICINE

## 2025-08-05 PROCEDURE — 74176 CT ABD & PELVIS W/O CONTRAST: CPT

## 2025-08-05 PROCEDURE — 84443 ASSAY THYROID STIM HORMONE: CPT | Performed by: EMERGENCY MEDICINE

## 2025-08-05 PROCEDURE — 36415 COLL VENOUS BLD VENIPUNCTURE: CPT

## 2025-08-05 RX ORDER — SODIUM CHLORIDE 0.9 % (FLUSH) 0.9 %
10 SYRINGE (ML) INJECTION AS NEEDED
Status: DISCONTINUED | OUTPATIENT
Start: 2025-08-05 | End: 2025-08-07 | Stop reason: HOSPADM

## 2025-08-05 RX ORDER — ACETAMINOPHEN 500 MG
1000 TABLET ORAL ONCE
Status: COMPLETED | OUTPATIENT
Start: 2025-08-05 | End: 2025-08-05

## 2025-08-05 RX ORDER — KETOROLAC TROMETHAMINE 30 MG/ML
15 INJECTION, SOLUTION INTRAMUSCULAR; INTRAVENOUS ONCE
Status: COMPLETED | OUTPATIENT
Start: 2025-08-05 | End: 2025-08-05

## 2025-08-05 RX ADMIN — SODIUM CHLORIDE 2000 MG: 9 INJECTION, SOLUTION INTRAVENOUS at 22:53

## 2025-08-05 RX ADMIN — ACETAMINOPHEN 1000 MG: 500 TABLET, FILM COATED ORAL at 22:56

## 2025-08-05 RX ADMIN — KETOROLAC TROMETHAMINE 15 MG: 30 INJECTION INTRAMUSCULAR; INTRAVENOUS at 23:29

## 2025-08-05 RX ADMIN — SODIUM CHLORIDE 500 ML: 9 INJECTION, SOLUTION INTRAVENOUS at 22:50

## 2025-08-06 PROBLEM — A41.9 SEPSIS: Status: ACTIVE | Noted: 2025-08-06

## 2025-08-06 LAB
BILIRUB UR QL STRIP: NEGATIVE
CLARITY UR: CLEAR
COLOR UR: ABNORMAL
GEN 5 1HR TROPONIN T REFLEX: 8 NG/L
GLUCOSE UR STRIP-MCNC: NEGATIVE MG/DL
HGB UR QL STRIP.AUTO: NEGATIVE
KETONES UR QL STRIP: ABNORMAL
LEUKOCYTE ESTERASE UR QL STRIP.AUTO: ABNORMAL
NITRITE UR QL STRIP: NEGATIVE
PH UR STRIP.AUTO: 7.5 [PH] (ref 5–8)
PROT UR QL STRIP: ABNORMAL
QT INTERVAL: 298 MS
QTC INTERVAL: 415 MS
SP GR UR STRIP: 1.02 (ref 1–1.03)
TROPONIN T NUMERIC DELTA: 1 NG/L
UROBILINOGEN UR QL STRIP: ABNORMAL

## 2025-08-06 PROCEDURE — 25810000003 SODIUM CHLORIDE 0.9 % SOLUTION: Performed by: INTERNAL MEDICINE

## 2025-08-06 PROCEDURE — 99223 1ST HOSP IP/OBS HIGH 75: CPT | Performed by: INTERNAL MEDICINE

## 2025-08-06 PROCEDURE — 25010000002 ENOXAPARIN PER 10 MG: Performed by: INTERNAL MEDICINE

## 2025-08-06 PROCEDURE — 25010000002 CEFTRIAXONE PER 250 MG: Performed by: INTERNAL MEDICINE

## 2025-08-06 PROCEDURE — 97161 PT EVAL LOW COMPLEX 20 MIN: CPT

## 2025-08-06 RX ORDER — SODIUM CHLORIDE 0.9 % (FLUSH) 0.9 %
10 SYRINGE (ML) INJECTION EVERY 12 HOURS SCHEDULED
Status: DISCONTINUED | OUTPATIENT
Start: 2025-08-06 | End: 2025-08-07 | Stop reason: HOSPADM

## 2025-08-06 RX ORDER — POLYETHYLENE GLYCOL 3350 17 G/17G
17 POWDER, FOR SOLUTION ORAL DAILY PRN
Status: DISCONTINUED | OUTPATIENT
Start: 2025-08-06 | End: 2025-08-07 | Stop reason: HOSPADM

## 2025-08-06 RX ORDER — ISOSORBIDE MONONITRATE 60 MG/1
60 TABLET, EXTENDED RELEASE ORAL NIGHTLY
Status: CANCELLED | OUTPATIENT
Start: 2025-08-06

## 2025-08-06 RX ORDER — ENOXAPARIN SODIUM 100 MG/ML
40 INJECTION SUBCUTANEOUS
Status: DISCONTINUED | OUTPATIENT
Start: 2025-08-06 | End: 2025-08-07 | Stop reason: HOSPADM

## 2025-08-06 RX ORDER — SODIUM CHLORIDE 0.9 % (FLUSH) 0.9 %
10 SYRINGE (ML) INJECTION AS NEEDED
Status: DISCONTINUED | OUTPATIENT
Start: 2025-08-06 | End: 2025-08-07 | Stop reason: HOSPADM

## 2025-08-06 RX ORDER — BISACODYL 5 MG/1
5 TABLET, DELAYED RELEASE ORAL DAILY PRN
Status: DISCONTINUED | OUTPATIENT
Start: 2025-08-06 | End: 2025-08-07 | Stop reason: HOSPADM

## 2025-08-06 RX ORDER — NITROFURANTOIN 25; 75 MG/1; MG/1
100 CAPSULE ORAL 2 TIMES DAILY
Status: CANCELLED | OUTPATIENT
Start: 2025-08-06 | End: 2025-08-12

## 2025-08-06 RX ORDER — NITROGLYCERIN 0.4 MG/1
0.4 TABLET SUBLINGUAL
Status: DISCONTINUED | OUTPATIENT
Start: 2025-08-06 | End: 2025-08-07 | Stop reason: HOSPADM

## 2025-08-06 RX ORDER — TIRZEPATIDE 7.5 MG/.5ML
0.5 INJECTION, SOLUTION SUBCUTANEOUS
COMMUNITY

## 2025-08-06 RX ORDER — ACETAMINOPHEN 325 MG/1
650 TABLET ORAL EVERY 6 HOURS PRN
Status: DISCONTINUED | OUTPATIENT
Start: 2025-08-06 | End: 2025-08-06

## 2025-08-06 RX ORDER — ROSUVASTATIN CALCIUM 20 MG/1
40 TABLET, COATED ORAL NIGHTLY
Status: DISCONTINUED | OUTPATIENT
Start: 2025-08-06 | End: 2025-08-07 | Stop reason: HOSPADM

## 2025-08-06 RX ORDER — METOPROLOL SUCCINATE 25 MG/1
25 TABLET, EXTENDED RELEASE ORAL NIGHTLY
Status: CANCELLED | OUTPATIENT
Start: 2025-08-06

## 2025-08-06 RX ORDER — CETIRIZINE HYDROCHLORIDE 10 MG/1
10 TABLET ORAL NIGHTLY
Status: DISCONTINUED | OUTPATIENT
Start: 2025-08-06 | End: 2025-08-07 | Stop reason: HOSPADM

## 2025-08-06 RX ORDER — HYDROCHLOROTHIAZIDE 25 MG/1
12.5 TABLET ORAL
Status: CANCELLED | OUTPATIENT
Start: 2025-08-06

## 2025-08-06 RX ORDER — ACETAMINOPHEN 500 MG
1000 TABLET ORAL EVERY 8 HOURS PRN
Status: DISCONTINUED | OUTPATIENT
Start: 2025-08-06 | End: 2025-08-07 | Stop reason: HOSPADM

## 2025-08-06 RX ORDER — LISINOPRIL 10 MG/1
20 TABLET ORAL
Status: CANCELLED | OUTPATIENT
Start: 2025-08-06

## 2025-08-06 RX ORDER — METOPROLOL TARTRATE 25 MG/1
12.5 TABLET, FILM COATED ORAL EVERY 12 HOURS SCHEDULED
Status: DISCONTINUED | OUTPATIENT
Start: 2025-08-06 | End: 2025-08-07 | Stop reason: HOSPADM

## 2025-08-06 RX ORDER — OXYBUTYNIN CHLORIDE 5 MG/1
5 TABLET, EXTENDED RELEASE ORAL DAILY
Status: DISCONTINUED | OUTPATIENT
Start: 2025-08-06 | End: 2025-08-07 | Stop reason: HOSPADM

## 2025-08-06 RX ORDER — PANTOPRAZOLE SODIUM 40 MG/1
40 TABLET, DELAYED RELEASE ORAL
Status: DISCONTINUED | OUTPATIENT
Start: 2025-08-06 | End: 2025-08-07 | Stop reason: HOSPADM

## 2025-08-06 RX ORDER — MULTIPLE VITAMINS W/ MINERALS TAB 9MG-400MCG
1 TAB ORAL DAILY
Status: DISCONTINUED | OUTPATIENT
Start: 2025-08-06 | End: 2025-08-07 | Stop reason: HOSPADM

## 2025-08-06 RX ORDER — AMOXICILLIN 250 MG
2 CAPSULE ORAL 2 TIMES DAILY PRN
Status: DISCONTINUED | OUTPATIENT
Start: 2025-08-06 | End: 2025-08-07 | Stop reason: HOSPADM

## 2025-08-06 RX ORDER — BISACODYL 10 MG
10 SUPPOSITORY, RECTAL RECTAL DAILY PRN
Status: DISCONTINUED | OUTPATIENT
Start: 2025-08-06 | End: 2025-08-07 | Stop reason: HOSPADM

## 2025-08-06 RX ORDER — SODIUM CHLORIDE 9 MG/ML
100 INJECTION, SOLUTION INTRAVENOUS CONTINUOUS
Status: ACTIVE | OUTPATIENT
Start: 2025-08-06 | End: 2025-08-06

## 2025-08-06 RX ORDER — SODIUM CHLORIDE 9 MG/ML
40 INJECTION, SOLUTION INTRAVENOUS AS NEEDED
Status: DISCONTINUED | OUTPATIENT
Start: 2025-08-06 | End: 2025-08-07 | Stop reason: HOSPADM

## 2025-08-06 RX ORDER — ASPIRIN 81 MG/1
81 TABLET ORAL NIGHTLY
Status: DISCONTINUED | OUTPATIENT
Start: 2025-08-06 | End: 2025-08-07 | Stop reason: HOSPADM

## 2025-08-06 RX ADMIN — ACETAMINOPHEN 1000 MG: 500 TABLET, FILM COATED ORAL at 22:10

## 2025-08-06 RX ADMIN — Medication 10 ML: at 09:42

## 2025-08-06 RX ADMIN — CEFTRIAXONE 2000 MG: 2 INJECTION, POWDER, FOR SOLUTION INTRAMUSCULAR; INTRAVENOUS at 22:10

## 2025-08-06 RX ADMIN — SODIUM CHLORIDE 100 ML/HR: 9 INJECTION, SOLUTION INTRAVENOUS at 09:45

## 2025-08-06 RX ADMIN — OXYBUTYNIN CHLORIDE 5 MG: 5 TABLET, EXTENDED RELEASE ORAL at 09:41

## 2025-08-06 RX ADMIN — Medication 1 TABLET: at 09:41

## 2025-08-06 RX ADMIN — ROSUVASTATIN CALCIUM 40 MG: 20 TABLET, FILM COATED ORAL at 05:30

## 2025-08-06 RX ADMIN — PANTOPRAZOLE SODIUM 40 MG: 40 TABLET, DELAYED RELEASE ORAL at 05:30

## 2025-08-06 RX ADMIN — ASPIRIN 81 MG: 81 TABLET, COATED ORAL at 20:04

## 2025-08-06 RX ADMIN — ACETAMINOPHEN 650 MG: 325 TABLET ORAL at 14:35

## 2025-08-06 RX ADMIN — Medication 10 ML: at 20:02

## 2025-08-06 RX ADMIN — ASPIRIN 81 MG: 81 TABLET, COATED ORAL at 05:29

## 2025-08-06 RX ADMIN — CETIRIZINE HYDROCHLORIDE 10 MG: 10 TABLET, FILM COATED ORAL at 20:04

## 2025-08-06 RX ADMIN — ENOXAPARIN SODIUM 40 MG: 100 INJECTION SUBCUTANEOUS at 05:30

## 2025-08-06 RX ADMIN — ROSUVASTATIN CALCIUM 40 MG: 20 TABLET, FILM COATED ORAL at 20:04

## 2025-08-06 RX ADMIN — CETIRIZINE HYDROCHLORIDE 10 MG: 10 TABLET, FILM COATED ORAL at 05:30

## 2025-08-07 VITALS
WEIGHT: 162.4 LBS | SYSTOLIC BLOOD PRESSURE: 135 MMHG | OXYGEN SATURATION: 93 % | TEMPERATURE: 98.8 F | DIASTOLIC BLOOD PRESSURE: 89 MMHG | BODY MASS INDEX: 30.66 KG/M2 | HEIGHT: 61 IN | HEART RATE: 97 BPM | RESPIRATION RATE: 20 BRPM

## 2025-08-07 LAB
ALBUMIN SERPL-MCNC: 3.2 G/DL (ref 3.5–5.2)
ALBUMIN/GLOB SERPL: 1.5 G/DL
ALP SERPL-CCNC: 91 U/L (ref 39–117)
ALT SERPL W P-5'-P-CCNC: 54 U/L (ref 1–33)
ANION GAP SERPL CALCULATED.3IONS-SCNC: 10.9 MMOL/L (ref 5–15)
AST SERPL-CCNC: 44 U/L (ref 1–32)
BASOPHILS # BLD AUTO: 0.02 10*3/MM3 (ref 0–0.2)
BASOPHILS NFR BLD AUTO: 0.4 % (ref 0–1.5)
BILIRUB SERPL-MCNC: 0.2 MG/DL (ref 0–1.2)
BUN SERPL-MCNC: 14.3 MG/DL (ref 8–23)
BUN/CREAT SERPL: 14.9 (ref 7–25)
CALCIUM SPEC-SCNC: 8.2 MG/DL (ref 8.6–10.5)
CHLORIDE SERPL-SCNC: 111 MMOL/L (ref 98–107)
CO2 SERPL-SCNC: 19.1 MMOL/L (ref 22–29)
CREAT SERPL-MCNC: 0.96 MG/DL (ref 0.57–1)
DEPRECATED RDW RBC AUTO: 47.3 FL (ref 37–54)
EGFRCR SERPLBLD CKD-EPI 2021: 61.4 ML/MIN/1.73
EOSINOPHIL # BLD AUTO: 0.56 10*3/MM3 (ref 0–0.4)
EOSINOPHIL NFR BLD AUTO: 12.3 % (ref 0.3–6.2)
ERYTHROCYTE [DISTWIDTH] IN BLOOD BY AUTOMATED COUNT: 13.8 % (ref 12.3–15.4)
GLOBULIN UR ELPH-MCNC: 2.1 GM/DL
GLUCOSE SERPL-MCNC: 90 MG/DL (ref 65–99)
HCT VFR BLD AUTO: 36.7 % (ref 34–46.6)
HGB BLD-MCNC: 11.7 G/DL (ref 12–15.9)
IMM GRANULOCYTES # BLD AUTO: 0.02 10*3/MM3 (ref 0–0.05)
IMM GRANULOCYTES NFR BLD AUTO: 0.4 % (ref 0–0.5)
LYMPHOCYTES # BLD AUTO: 0.99 10*3/MM3 (ref 0.7–3.1)
LYMPHOCYTES NFR BLD AUTO: 21.8 % (ref 19.6–45.3)
MCH RBC QN AUTO: 29.9 PG (ref 26.6–33)
MCHC RBC AUTO-ENTMCNC: 31.9 G/DL (ref 31.5–35.7)
MCV RBC AUTO: 93.9 FL (ref 79–97)
MONOCYTES # BLD AUTO: 0.45 10*3/MM3 (ref 0.1–0.9)
MONOCYTES NFR BLD AUTO: 9.9 % (ref 5–12)
NEUTROPHILS NFR BLD AUTO: 2.5 10*3/MM3 (ref 1.7–7)
NEUTROPHILS NFR BLD AUTO: 55.2 % (ref 42.7–76)
NRBC BLD AUTO-RTO: 0 /100 WBC (ref 0–0.2)
PLATELET # BLD AUTO: 147 10*3/MM3 (ref 140–450)
PMV BLD AUTO: 9.2 FL (ref 6–12)
POTASSIUM SERPL-SCNC: 3.4 MMOL/L (ref 3.5–5.2)
PROT SERPL-MCNC: 5.3 G/DL (ref 6–8.5)
RBC # BLD AUTO: 3.91 10*6/MM3 (ref 3.77–5.28)
SODIUM SERPL-SCNC: 141 MMOL/L (ref 136–145)
WBC NRBC COR # BLD AUTO: 4.54 10*3/MM3 (ref 3.4–10.8)

## 2025-08-07 PROCEDURE — 25010000002 ENOXAPARIN PER 10 MG: Performed by: INTERNAL MEDICINE

## 2025-08-07 PROCEDURE — 99239 HOSP IP/OBS DSCHRG MGMT >30: CPT | Performed by: STUDENT IN AN ORGANIZED HEALTH CARE EDUCATION/TRAINING PROGRAM

## 2025-08-07 PROCEDURE — 85025 COMPLETE CBC W/AUTO DIFF WBC: CPT | Performed by: STUDENT IN AN ORGANIZED HEALTH CARE EDUCATION/TRAINING PROGRAM

## 2025-08-07 PROCEDURE — 97165 OT EVAL LOW COMPLEX 30 MIN: CPT

## 2025-08-07 PROCEDURE — 80053 COMPREHEN METABOLIC PANEL: CPT | Performed by: STUDENT IN AN ORGANIZED HEALTH CARE EDUCATION/TRAINING PROGRAM

## 2025-08-07 RX ORDER — CEFUROXIME AXETIL 250 MG/1
250 TABLET ORAL 2 TIMES DAILY
Qty: 10 TABLET | Refills: 0 | Status: SHIPPED | OUTPATIENT
Start: 2025-08-07 | End: 2025-08-12

## 2025-08-07 RX ADMIN — ACETAMINOPHEN 1000 MG: 500 TABLET, FILM COATED ORAL at 09:10

## 2025-08-07 RX ADMIN — OXYBUTYNIN CHLORIDE 5 MG: 5 TABLET, EXTENDED RELEASE ORAL at 09:03

## 2025-08-07 RX ADMIN — PANTOPRAZOLE SODIUM 40 MG: 40 TABLET, DELAYED RELEASE ORAL at 05:05

## 2025-08-07 RX ADMIN — Medication 10 ML: at 09:04

## 2025-08-07 RX ADMIN — Medication 1 TABLET: at 09:03

## 2025-08-07 RX ADMIN — ENOXAPARIN SODIUM 40 MG: 100 INJECTION SUBCUTANEOUS at 09:03

## 2025-08-08 LAB — BACTERIA SPEC AEROBE CULT: NO GROWTH

## 2025-08-10 LAB
BACTERIA SPEC AEROBE CULT: NORMAL
BACTERIA SPEC AEROBE CULT: NORMAL

## (undated) DEVICE — NDL PERC 1PRT THNWALL W/BASEPLT 18G 7CM

## (undated) DEVICE — CATH DIAG EXPO M/ PK 6FR FL4/FR4 PIG 3PK

## (undated) DEVICE — ANGIO-SEAL VIP VASCULAR CLOSURE DEVICE: Brand: ANGIO-SEAL

## (undated) DEVICE — FRCP BX RADJAW4 NDL 2.8 240CM LG OG BX40

## (undated) DEVICE — KT MANIFOLD CATHLAB CUST

## (undated) DEVICE — Device

## (undated) DEVICE — PK CATH CARD 10

## (undated) DEVICE — INTRO SHEATH ART/FEM ENGAGE .038 6F12CM

## (undated) DEVICE — THE BITE BLOCK MAXI, LATEX FREE STRAP IS USED TO PROTECT THE ENDOSCOPE INSERTION TUBE FROM BEING BITTEN BY THE PATIENT.